# Patient Record
Sex: MALE | NOT HISPANIC OR LATINO | URBAN - METROPOLITAN AREA
[De-identification: names, ages, dates, MRNs, and addresses within clinical notes are randomized per-mention and may not be internally consistent; named-entity substitution may affect disease eponyms.]

---

## 2017-01-04 ENCOUNTER — TELEPHONE (OUTPATIENT)
Dept: FAMILY MEDICINE | Facility: CLINIC | Age: 67
End: 2017-01-04

## 2017-01-04 DIAGNOSIS — S93.409A ANKLE SPRAIN: Primary | ICD-10-CM

## 2017-01-04 RX ORDER — IBUPROFEN 400 MG/1
400 TABLET, FILM COATED ORAL EVERY 4 HOURS PRN
Qty: 30 TABLET | Refills: 0
Start: 2017-01-04 | End: 2022-06-29

## 2017-01-04 NOTE — TELEPHONE ENCOUNTER
[facility] phone call message - order or referral request for patient:     Order or referral being requested: Medication orders for the following medications:  aspirin (ASPIRIN) 81 MG EC tablet  levothyroxine (SYNTHROID, LEVOTHROID) 50 MCG tablet  omeprazole 20 MG tablet  Ibuprofen 400 MG (as needed for ankle sprain)    Additional Comments: Please fax to [place] at [place] at [phone]    OK to leave a message on voice mail? Yes    Primary language: English      needed? No    Call taken on January 4, 2017 at 2:12 PM by Siddhartha Glass

## 2017-01-04 NOTE — TELEPHONE ENCOUNTER
"Orders found for aspirin, levothyroxine, and omeprazole. No history of order for ibuprofen. Per preceptor, Dr. Romeo, Ibuprofen 400 mg PO, every 4 hours PRN entered as \"no print out.\"    All orders for requested medications printed, physically signed by Dr. Romeo, and faxed to [facility]. Fax successful.    Ana Laura Desai RN    "

## 2017-01-06 ENCOUNTER — OFFICE VISIT (OUTPATIENT)
Dept: FAMILY MEDICINE | Facility: CLINIC | Age: 67
End: 2017-01-06

## 2017-01-06 ENCOUNTER — TELEPHONE (OUTPATIENT)
Dept: FAMILY MEDICINE | Facility: CLINIC | Age: 67
End: 2017-01-06

## 2017-01-06 VITALS
WEIGHT: 249.4 LBS | BODY MASS INDEX: 36.94 KG/M2 | HEART RATE: 72 BPM | OXYGEN SATURATION: 97 % | RESPIRATION RATE: 16 BRPM | HEIGHT: 69 IN | DIASTOLIC BLOOD PRESSURE: 83 MMHG | SYSTOLIC BLOOD PRESSURE: 121 MMHG | TEMPERATURE: 97.8 F

## 2017-01-06 DIAGNOSIS — E03.9 HYPOTHYROIDISM, UNSPECIFIED TYPE: ICD-10-CM

## 2017-01-06 DIAGNOSIS — Z00.01 ENCOUNTER FOR ROUTINE ADULT MEDICAL EXAM WITH ABNORMAL FINDINGS: ICD-10-CM

## 2017-01-06 DIAGNOSIS — K40.90 RIGHT INGUINAL HERNIA: ICD-10-CM

## 2017-01-06 DIAGNOSIS — S92.354A CLOSED NONDISPLACED FRACTURE OF FIFTH METATARSAL BONE OF RIGHT FOOT, INITIAL ENCOUNTER: ICD-10-CM

## 2017-01-06 DIAGNOSIS — E03.4 HYPOTHYROIDISM DUE TO ACQUIRED ATROPHY OF THYROID: ICD-10-CM

## 2017-01-06 DIAGNOSIS — F20.0 PARANOID SCHIZOPHRENIA, CHRONIC CONDITION WITH ACUTE EXACERBATION (H): ICD-10-CM

## 2017-01-06 DIAGNOSIS — E03.9 HYPOTHYROIDISM, UNSPECIFIED TYPE: Primary | ICD-10-CM

## 2017-01-06 DIAGNOSIS — F30.10 MANIC BEHAVIOR (H): ICD-10-CM

## 2017-01-06 DIAGNOSIS — S92.356A CLOSED NONDISPLACED FRACTURE OF FIFTH METATARSAL BONE, UNSPECIFIED LATERALITY, INITIAL ENCOUNTER: ICD-10-CM

## 2017-01-06 DIAGNOSIS — M25.571 ACUTE RIGHT ANKLE PAIN: ICD-10-CM

## 2017-01-06 DIAGNOSIS — E22.2 SIADH (SYNDROME OF INAPPROPRIATE ADH PRODUCTION) (H): Primary | ICD-10-CM

## 2017-01-06 LAB
ALBUMIN SERPL-MCNC: 4.1 MG/DL (ref 3.5–4.7)
ALP SERPL-CCNC: 85.8 U/L (ref 31.7–110.7)
ALT SERPL-CCNC: 126.2 U/L (ref 0–45)
AST SERPL-CCNC: 74.2 U/L (ref 0–55)
BILIRUB SERPL-MCNC: 1.3 MG/DL (ref 0.2–1.3)
BUN SERPL-MCNC: 19.5 MG/DL (ref 7–21)
CALCIUM SERPL-MCNC: 9.2 MG/DL (ref 8.5–10.1)
CHLORIDE SERPLBLD-SCNC: 102 MMOL/L (ref 98–110)
CO2 SERPL-SCNC: 28.3 MMOL/L (ref 20–32)
CREAT SERPL-MCNC: 1 MG/DL (ref 0.7–1.3)
GFR SERPL CREATININE-BSD FRML MDRD: 80 ML/MIN/1.7 M2
GLUCOSE SERPL-MCNC: 107.7 MG'DL (ref 70–99)
POTASSIUM SERPL-SCNC: 4.8 MMOL/DL (ref 3.3–4.5)
PROT SERPL-MCNC: 7 G/DL (ref 6.8–8.8)
SODIUM SERPL-SCNC: 136.4 MMOL/L (ref 132.6–141.4)
T4 FREE SERPL-MCNC: 0.57 NG/DL (ref 0.76–1.46)
TSH SERPL DL<=0.005 MIU/L-ACNC: 15.4 MU/L (ref 0.4–4)

## 2017-01-06 RX ORDER — LEVOTHYROXINE SODIUM 75 UG/1
75 TABLET ORAL
Qty: 30 TABLET | Refills: 1 | Status: SHIPPED | OUTPATIENT
Start: 2017-01-06 | End: 2017-02-09

## 2017-01-06 RX ORDER — LEVOTHYROXINE SODIUM 75 UG/1
75 TABLET ORAL
Qty: 30 TABLET | Refills: 1 | Status: SHIPPED | OUTPATIENT
Start: 2017-01-06 | End: 2017-01-06

## 2017-01-06 NOTE — TELEPHONE ENCOUNTER
[facility] phone call message- general phone call:    Reason for call: [place], nurse at [facility], calling to request that prescription for levothyroxine (SYNTHROID/LEVOTHROID) 75 MCG tablet be switched to [facility] (was sent to [facility]).     Return call needed: No    OK to leave a message on voice mail? Yes    Primary language: English      needed? No    Call taken on January 6, 2017 at 1:40 PM by Preeti Mckeon

## 2017-01-06 NOTE — PATIENT INSTRUCTIONS
Here is the plan from today's visit    ## R fifth metatarsal fracture, initial encounter  - R boot  - follow up with our Sports medicine clinic in 1-2 weeks for further management    ## SIADH (syndrome of inappropriate ADH production) (H)  - Comprehensive Metabolic Panel ([facility])  - restrict you water intake to 3 glasses daily, reduce other fluid intake as well    ## Right inguinal hernia  - continue to follow up with your surgeon at [facility]    ## Hypothyroidism due to acquired atrophy of thyroid    - TSH with free T4 reflex    ## Manic behavior (H)  - continue with your current regimen as per you psychiatrist  - consider to enrol to [facility] - you met Dr Srivastava today      ## Hypothyroidism, unspecified type    - increased levothyroxine (SYNTHROID/LEVOTHROID) 75 MCG tablet; Take 1 tablet (75 mcg) by mouth every morning (before breakfast)  Dispense: 30 tablet; Refill: 1    ##  Foot injury  - obtain xray to rule out fracture    Please call or return to clinic if your symptoms don't go away.    Follow up plan  Please make a clinic appointment for follow up with me (OJ ROPER) in 2 weeks for thyroid disease and sodium level.    Thank you for coming to [facility] today.  Lab Testing:  **If you had lab testing today and your results are reassuring or normal they will be mailed to you or sent through [employer] within 7 days.   **If the lab tests need quick action we will call you with the results.  The phone number we will call with results is # [phone] (home) none (work). If this is not the best number please call our clinic and change the number.  Medication Refills:  If you need any refills please call your pharmacy and they will contact us.   If you need to  your refill at a new pharmacy, please contact the new pharmacy directly. The new pharmacy will help you get your medications transferred faster.   Scheduling:  If you have any concerns about today's visit or wish to schedule  another appointment please call our office during normal business hours [phone] (8-5:00 M-F)  If a referral was made to a [facility] and you don't get a call from central scheduling please call [phone].  If a Mammogram was ordered for you at [facility] call [phone] to schedule or change your appointment.  If you had an XRay/CT/Ultrasound/MRI ordered the number is [phone] to schedule or change your radiology appointment.   Medical Concerns:  If you have urgent medical concerns please call [phone] at any time of the day.      Preventive Health Recommendations  Male Ages 50 - 64    Yearly exam:             See your health care provider every year in order to  o   Review health changes.   o   Discuss preventive care.    o   Review your medicines if your doctor has prescribed any.     Have a cholesterol test every 5 years, or more frequently if you are at risk for high cholesterol/heart disease.     Have a diabetes test (fasting glucose) every three years. If you are at risk for diabetes, you should have this test more often.     Have a colonoscopy at age 50, or have a yearly FIT test (stool test). These exams will check for colon cancer.      Talk with your health care provider about whether or not a prostate cancer screening test (PSA) is right for you.    You should be tested each year for STDs (sexually transmitted diseases), if you re at risk.     Shots: Get a flu shot each year. Get a tetanus shot every 10 years.     Nutrition:    Eat at least 5 servings of fruits and vegetables daily.     Eat whole-grain bread, whole-wheat pasta and brown rice instead of white grains and rice.     Talk to your provider about Calcium and Vitamin D.     Lifestyle    Exercise for at least 150 minutes a week (30 minutes a day, 5 days a week). This will help you control your weight and prevent disease.     Limit alcohol to one drink per day.     No smoking.     Wear sunscreen to  prevent skin cancer.     See your dentist every six months for an exam and cleaning.     See your eye doctor every 1 to 2 years.

## 2017-01-06 NOTE — MR AVS SNAPSHOT
After Visit Summary   1/6/2017    Feliciano Ross    MRN: [id number]           Patient Information     Date Of Birth          1950        Visit Information        Provider Department      1/6/2017 9:20 AM Marika Padron MD [facility]        Today's Diagnoses     SIADH (syndrome of inappropriate ADH production) (H)    -  1     Right inguinal hernia         Hypothyroidism due to acquired atrophy of thyroid         Manic behavior (H)         Acute right ankle pain         Hypothyroidism, unspecified type         Foot injury         Closed nondisplaced fracture of fifth metatarsal bone of right foot, initial encounter           Care Instructions    Here is the plan from today's visit    ## R fifth metatarsal fracture, initial encounter  - R boot  - follow up with our Sports medicine clinic in 1-2 weeks for further management    ## SIADH (syndrome of inappropriate ADH production) (H)  - Comprehensive Metabolic Panel ([facility])  - restrict you water intake to 3 glasses daily, reduce other fluid intake as well    ## Right inguinal hernia  - continue to follow up with your surgeon at [facility]    ## Hypothyroidism due to acquired atrophy of thyroid    - TSH with free T4 reflex    ## Manic behavior (H)  - continue with your current regimen as per you psychiatrist  - consider to enrol to [facility] - you met Dr Srivastava today      ## Hypothyroidism, unspecified type    - increased levothyroxine (SYNTHROID/LEVOTHROID) 75 MCG tablet; Take 1 tablet (75 mcg) by mouth every morning (before breakfast)  Dispense: 30 tablet; Refill: 1    ##  Foot injury  - obtain xray to rule out fracture    Please call or return to clinic if your symptoms don't go away.    Follow up plan  Please make a clinic appointment for follow up with me (MARIKA PADRON) in 2 weeks for thyroid disease and sodium level.    Thank you for coming to [facility] today.  Lab Testing:  **If you had lab testing  today and your results are reassuring or normal they will be mailed to you or sent through [employer] within 7 days.   **If the lab tests need quick action we will call you with the results.  The phone number we will call with results is # [phone] (home) none (work). If this is not the best number please call our clinic and change the number.  Medication Refills:  If you need any refills please call your pharmacy and they will contact us.   If you need to  your refill at a new pharmacy, please contact the new pharmacy directly. The new pharmacy will help you get your medications transferred faster.   Scheduling:  If you have any concerns about today's visit or wish to schedule another appointment please call our office during normal business hours [phone] (8-5:00 M-F)  If a referral was made to a [facility] and you don't get a call from central scheduling please call [phone].  If a Mammogram was ordered for you at [facility] call [phone] to schedule or change your appointment.  If you had an XRay/CT/Ultrasound/MRI ordered the number is [phone] to schedule or change your radiology appointment.   Medical Concerns:  If you have urgent medical concerns please call [phone] at any time of the day.            Follow-ups after your visit        Additional Services     [facility] INTERNAL REFERRAL       Reason: R fifth metatarsal fracture  Urgency of Appointment: Urgent (Within 1-2 week)  Length of Problem: Acute (0-3 weeks since onset): Ordering Provider - please ensure that x-rays are obtained if concern for bone or joint.  What are you requesting be done? Management Recommendations                  Future tests that were ordered for you today     Open Future Orders        Priority Expected Expires Ordered    X-ray rt Foot G/E 3 vws* Routine 1/6/2017 1/6/2018 1/6/2017            Who to contact     Please call your clinic at [phone]  "to:    Ask questions about your health    Make or cancel appointments    Discuss your medicines    Learn about your test results    Speak to your doctor   If you have compliments or concerns about an experience at your clinic, or if you wish to file a complaint, please contact [facility] Patient Relations at [phone] or email us at [email or web address]         Additional Information About Your Visit        [employer] Information     [employer] is an electronic gateway that provides easy, online access to your medical records. With [employer], you can request a clinic appointment, read your test results, renew a prescription or communicate with your care team.     To sign up for [employer] visit the website at [email or web address]   You will be asked to enter the access code listed below, as well as some personal information. Please follow the directions to create your username and password.     Your access code is: [email or web address]  Expires: 2017 11:04 AM     Your access code will  in 90 days. If you need help or a new code, please contact your [facility] or call [phone] for assistance.        Care EveryWhere ID     This is your Care EveryWhere ID. This could be used by other organizations to access your [place] medical records  [id number]        Your Vitals Were     Pulse Temperature Respirations Height BMI (Body Mass Index) Pulse Oximetry    72 97.8  F (36.6  C) (Oral) 16 5' 9\" (175.3 cm) 36.81 kg/m2 97%       Blood Pressure from Last 3 Encounters:   17 121/83   11/10/16 110/74   16 120/74    Weight from Last 3 Encounters:   17 249 lb 6.4 oz (113.127 kg)   11/10/16 233 lb (105.688 kg)   16 234 lb 9.6 oz (106.414 kg)              We Performed the Following     Comprehensive Metabolic Panel ([facility])     [facility] INTERNAL REFERRAL     TSH with free T4 reflex          Today's Medication " Changes          These changes are accurate as of: 1/6/17 11:21 AM.  If you have any questions, ask your nurse or doctor.               These medicines have changed or have updated prescriptions.        Dose/Directions    levothyroxine 75 MCG tablet   Commonly known as:  SYNTHROID/LEVOTHROID   This may have changed:    - medication strength  - how much to take   Used for:  Hypothyroidism, unspecified type   Changed by:  Marika Padron MD        Dose:  75 mcg   Take 1 tablet (75 mcg) by mouth every morning (before breakfast)   Quantity:  30 tablet   Refills:  1            Where to get your medicines      These medications were sent to [facility] - [place], [place] - [address]  [address] [address]     Phone:  [phone]    - levothyroxine 75 MCG tablet             Primary Care Provider Office Phone # Fax #    Marika Padron [phone]       [facility] [address] [facility] [address]        Thank you!     Thank you for choosing [facility]  for your care. Our goal is always to provide you with excellent care. Hearing back from our patients is one way we can continue to improve our services. Please take a few minutes to complete the written survey that you may receive in the mail after your visit with us. Thank you!             Your Updated Medication List - Protect others around you: Learn how to safely use, store and throw away your medicines at www.disposemymeds.org.          This list is accurate as of: 1/6/17 11:21 AM.  Always use your most recent med list.                   Brand Name Dispense Instructions for use    ARIPiprazole 10 MG tablet    ABILIFY    60 tablet    Take 1 tablet (10 mg) by mouth 2 times daily       aspirin 81 MG EC tablet    aspirin    90 tablet    Take 1 tablet (81 mg) by mouth daily       BENADRYL ALLERGY PO      Take 25 mg by mouth every morning       ibuprofen 400 MG tablet    ADVIL/MOTRIN     30 tablet    Take 1 tablet (400 mg) by mouth every 4 hours as needed for pain       levothyroxine 75 MCG tablet    SYNTHROID/LEVOTHROID    30 tablet    Take 1 tablet (75 mcg) by mouth every morning (before breakfast)       mineral oil-hydrophilic petrolatum     420 g    Apply 1-2 x daily to right leg wound       omeprazole 20 MG tablet     90 tablet    Take 1 tablet (20 mg) by mouth daily Take 30-60 minutes before a meal.       QUEtiapine 50 MG Tb24 24 hr tablet    SEROquel XR     Take 100 mg by mouth       traZODone 50 MG tablet    DESYREL     Take 50 mg by mouth nightly as needed for sleep (May repeat x 1 if needed)

## 2017-01-06 NOTE — PROGRESS NOTES
Primary Care Behavioral Health Consult Note    Meeting was: unscheduled  Others present: patient  Meeting lasted: 10 minutes    Identifying Information and Presenting Problem:    Dr. Padron requested behavioral health consultation for this patient regarding [facility] entry.  The patient is a 66 year old American male and agreed to be seen by behavioral health today.    Topics Discussed/Interventions Provided:     Discussed BHH with this patient.  He is not interested at this time as he feels there are so many people already involved in his care. Discussed how [facility] might assist with managing this.  We can reapproach in the future to discuss further.    Completed two releases of information with this patient to talk to his psychiatrist       Plan:    Please refer patient in the future if he should change his mind.

## 2017-01-06 NOTE — PROGRESS NOTES
Preceptor Attestation:   Patient seen and discussed with the resident. Assessment and plan reviewed with resident and agreed upon.  Reviewed xray.    Supervising Physician:  Khoi Sheffield MD  [facility]

## 2017-01-08 RX ORDER — ARIPIPRAZOLE 10 MG/1
10 TABLET ORAL 2 TIMES DAILY PRN
Qty: 60 TABLET | Refills: 0 | COMMUNITY
Start: 2017-01-08 | End: 2021-10-28

## 2017-01-08 NOTE — PROGRESS NOTES
"  Male Physical Note          HPI         Concerns today:   - R knee and R foot pain. States having trauma to his  R knee one month ago - bumped on the stair border, getting better. R foot pain after an injury 3 weeks ago: possible \"ankle sprain\". Was seen at [facility], no R foot imaging done, prescribed Ibuprofen which helps. Is able to walk but still painful and some numbness on the lateral aspect of the R foot.  - pt with SIADH: reports monitoring water intake - 3-4 glasses a day plus 1 can of a pop drink. Last lab eval revealed slightly low sodium, normal urine and serum osm and guerda urine sodium.   - followed by his psychiatrist Dr Yaya Treviño (phone nr [phone]). Our MedRec is not up to date: patient is getting Abilify injection once in 28 days and take Abilify PO 10 mg of hearing voices. Injection is given by the nurse Rosa (phone nr [phone]). States that he is stable right now.   - last lab eval with elevated TSH and low T4, patietn states taking 50 mcg Levothyroxine daily.   - no other complians    Of note:   - [facility] [place], was kicked out form the previous one due to disciplinary issues (was relocated per court order from 12/29/16, scanned to the chart)   - pt with chronic Hep C infection. No GI appointment recently       Patient Active Problem List   Diagnosis     Paranoid schizophrenia (H)     Gastritis     Obesity     SK (seborrheic keratosis)     Cherry hemangioma     Dermatofibroma     Chronic hepatitis C without hepatic coma (H)     Manic behavior (H)     Hypothyroidism     Right inguinal hernia     SIADH (syndrome of inappropriate ADH production) (H)       Past Medical History   Diagnosis Date     Subclinical hypothyroidism 5/27/2016     Depressive disorder      Manic affective disorder with recurrent episode (H)      Family History   Problem Relation Age of Onset     DIABETES Mother      Type 2     DIABETES Brother      Type " 2     Breast Cancer No family hx of      Cancer - colorectal No family hx of      Prostate Cancer No family hx of      Hypertension No family hx of      C.A.D. No family hx of      CANCER No family hx of      Respiratory No family hx of               Review of Systems:     Review of Systems:  CONSTITUTIONAL: NEGATIVE for fever, chills, change in weight  INTEGUMENTARY/SKIN: NEGATIVE for worrisome rashes, moles or lesions  EYES: NEGATIVE for vision changes or irritation  ENT/MOUTH: NEGATIVE for ear, mouth and throat problems  RESP: NEGATIVE for significant cough or SOB  CV: NEGATIVE for chest pain, palpitations or peripheral edema  GI: NEGATIVE for nausea, abdominal pain, heartburn, or change in bowel habits  : NEGATIVE for frequency, dysuria, or hematuria  MUSCULOSKELETAL: NEGATIVE for significant arthralgias or myalgia  NEURO: NEGATIVE for weakness, dizziness or paresthesias  ENDOCRINE: NEGATIVE for temperature intolerance, skin/hair changes  HEME/ALLERGY: NEGATIVE for bleeding problems  PSYCHIATRIC: NEGATIVE for acute changes in mood or affect  Musciloskeletal: R knee and R foot pain    Sleep:   Do you snore most or the night (as reported by a family member)? No  Do you feel sleepy or extremely tired during most of the day? No         Social History     Social History     Social History     Marital Status: Unknown     Spouse Name: N/A     Number of Children: N/A     Years of Education: N/A     Occupational History     Not on file.     Social History Main Topics     Smoking status: Current Every Day Smoker -- 1.50 packs/day for 50 years     Types: Cigarettes     Smokeless tobacco: Never Used     Alcohol Use: Yes      Comment: 1-2 beers miriam 3 months     Drug Use: No      Comment: used cocaine--last time was year ago.      Sexual Activity: No     Other Topics Concern     Parent/Sibling W/ Cabg, Mi Or Angioplasty Before 65f 55m? No     Social History Narrative       Marital Status: Single, has two children  Who  "lives in your household? Just him    Has anyone hurt you physically, for example by pushing, hitting, slapping or kicking you or forcing you to have sex? Denies  Do you feel threatened or controlled by a partner, ex-partner or anyone in your life? Denies    Denies alcohol use    Sexual Health     Sexual concerns: No   STI History: Neg      Recommended Screening     Cholesterol Level (>44 yo or at risk):  Not done today. Will obtain at the appointment  Colon CA Screening (>50-75 ):  Not discussed today given other acute complains, will discuss at the next appointment         Physical Exam:     Vitals: /83 mmHg  Pulse 72  Temp(Src) 97.8  F (36.6  C) (Oral)  Resp 16  Ht 5' 9\" (175.3 cm)  Wt 249 lb 6.4 oz (113.127 kg)  BMI 36.81 kg/m2  SpO2 97%  BMI= Body mass index is 36.81 kg/(m^2).  GENERAL: healthy, alert and no distress  EYES: Eyes grossly normal to inspection, extraocular movements - intact, and PERRL  HENT: ear canals- normal; TMs- normal; Nose- normal; Mouth- no ulcers, no lesions  NECK: no tenderness, no adenopathy, no asymmetry, no masses, no stiffness; thyroid- normal to palpation  RESP: lungs clear to auscultation - no rales, no rhonchi, no wheezes  BREAST: no masses, no tenderness, no nipple discharge, no palpable axillary masses or adenopathy  CV: regular rates and rhythm, normal S1 S2, no S3 or S4 and no murmur, no click or rub -  ABDOMEN: soft, no tenderness, no  hepatosplenomegaly, no masses, normal bowel sounds. Right inguinal hernia with some tenderness on examination.  MS: extremities- slightly tender over the R patella, no signs of bruising, no swelling. R foot very tender to touch on the lateral and 5th metatarsal bone. Numbness on the lateral site. No other gross deformities noted, no edema  SKIN: no suspicious lesions, no rashes  NEURO: strength and tone- normal, sensory exam- grossly normal, mentation- intact, speech- slighly slurred, normal, reflexes- symmetric  BACK: no CVA " tenderness, no paralumbar tenderness  - male:   PSYCH: Alert and oriented times 3; speech- slightly slurred but coherent , normal rate and volume; able to articulate logical thoughts, able to abstract reason, no tangential thoughts, no reported hallucinations or delusions, affect- normal      Assessment and Plan   Feliciano was seen today for physical, knee pain and musculoskeletal problem.    Diagnoses and all orders for this visit:    ## R fifth metatarsal fracture, initial encounter   - R boot   - follow up with our Sports medicine clinic in 1-2 weeks for further management   ## SIADH (syndrome of inappropriate ADH production) (H)   - Comprehensive Metabolic Panel ([facility])   - continue to restrict water intake to 3 glasses daily, reduce other fluid intake as well   ## Right inguinal hernia, slightly tender to touch  - continue to follow up with your surgeon at [facility]   ## Hypothyroidism, poorly controlled  - TSH with free T4 reflex   - increased levothyroxine (SYNTHROID/LEVOTHROID) 75 MCG tablet; Take 1 tablet (75 mcg) by mouth every morning (before breakfast) Dispense: 30 tablet; Refill: 1   ## Manic behavior (H)   - continue with your current regimen as per you psychiatrist   - consider to enrol to [facility] - intake by Dr Srivastava today     Follow up plan: in 2 weeks for thyroid disease and sodium level. We also need to discuss screening (Lipid panel and Colonoscopy) as well as Hep C situation.     Options for treatment and follow-up care were reviewed with the patient. Feliciano Ross and/or guardian engaged in the decision making process and verbalized understanding of the options discussed and agreed with the final plan.    Marika Padron MD  [facility],  G2 Family Medicine Resident  #[id number]

## 2017-01-17 ENCOUNTER — OFFICE VISIT (OUTPATIENT)
Dept: FAMILY MEDICINE | Facility: CLINIC | Age: 67
End: 2017-01-17

## 2017-01-17 VITALS
TEMPERATURE: 98.3 F | WEIGHT: 258.6 LBS | DIASTOLIC BLOOD PRESSURE: 77 MMHG | OXYGEN SATURATION: 96 % | RESPIRATION RATE: 18 BRPM | HEART RATE: 75 BPM | SYSTOLIC BLOOD PRESSURE: 114 MMHG | BODY MASS INDEX: 38.17 KG/M2

## 2017-01-17 DIAGNOSIS — E03.9 HYPOTHYROIDISM, UNSPECIFIED TYPE: ICD-10-CM

## 2017-01-17 DIAGNOSIS — S92.001A CLOSED FRACTURE OF RIGHT CALCANEUS, UNSPECIFIED PORTION OF CALCANEUS, INITIAL ENCOUNTER: ICD-10-CM

## 2017-01-17 DIAGNOSIS — S92.354D CLOSED NONDISPLACED FRACTURE OF FIFTH METATARSAL BONE OF RIGHT FOOT WITH ROUTINE HEALING, SUBSEQUENT ENCOUNTER: Primary | ICD-10-CM

## 2017-01-17 NOTE — PROGRESS NOTES
HPI:       Feliciano Ross is a 66 year old who presents for the following:  Taking a couch out by walking backwards and mistepped.  At his sister's and didn't come in for 3 weeks.  Was taking NSAIDs at the time.  Pt reports he's been in the CAM boot since seen.    Pt reports he's gained 12 lbs.  Taking thyroid pill for 6 -8 months.  Cut down eating, eating 3 meals a day.  Lives at [facility] in [place].  States that he's been taking thyroid pills, Noted TSH is higher on last visit labs.  Patient presents with:  Ankle/Foot right: fracture, was seen 1/06/17  Thyroid Problem: follow up      Joint Pain     Onset:     Injury  Yes Details: taking out couch     Description:   Location(s): 5th metatarsal fracture  Character: improving, no pain    Intensity: mild    Progression of Symptoms: better    Accompanying Signs & Symptoms:  Other symptoms: none   History:   Previous similar pain: no      Worsened by    Overuse?: no  Morning Stiffness?:no    Alleviating factors:  Improved by: rest/inactivity and NSAID - ibuprofen, didn't get script for Tylenol last visit       Therapies Tried and outcome: improving with time, Details: improving right foot, gained weight    No  was used for  this visit.      Problem, Medication and Allergy Lists were reviewed and are current.  Patient is an established patient of this clinic.         Review of Systems:   Review of Systems          Physical Exam:     Patient Vitals for the past 24 hrs:   BP Temp Temp src Pulse Resp SpO2 Weight   01/17/17 1049 114/77 mmHg 98.3  F (36.8  C) Oral 75 18 96 % 258 lb 9.6 oz (117.3 kg)     Body mass index is 38.17 kg/(m^2).  Vitals were reviewed and were normal     Physical Exam    ANKLE  Inspection/Palpation:     Swelling: no swelling      Non-tender: ATFL, CFL, PTFL, medial malleolus, deltoid ligament, anterior tib-fib ligament, achilles  tendon, no achilles tendon defect   Range of Motion: dorsiflexion: full, plantarflexion: full,  inversion: full, eversion: full  Strength:dorsiflexion: 5/5, plantarflexion: 5/5, inversion: 5/5, eversion: 5/5   Special tests: negative anterior drawer, negative varus stress, negative valgus stress, negative forced external rotation, negative Barnes sign     FOOT  Inspection/Palpation:      Swelling: mild swelling lateral foot  Tender: 5th metatarsal     Non-tender: promixal 5th metatarsal, 1st, 2nd, 3rd, 4th metatarsals, calcaneous, cuboid,  navicular, cuneiform lateral, cuneiform middle, cuneiform medial, metatarsal heads, peroneal tendon: at lateral malleolus, at cuboid, at proximal 5th metatarsal, posterior tibial tendon at medial malleolus, posterior tibial tendon at navicular, plantar fascia  Range of Motion: flexion of toes: full, extension of toes: full      Results:      Results from the last 24 hoursNo results found for this or any previous visit (from the past 24 hour(s)).  Assessment and Plan     1. Closed nondisplaced fracture of fifth metatarsal bone of right foot with routine healing, subsequent encounter  CAM boot- work on transitioning to shoe  Declines PT  - X-ray rt Foot G/E 3 vws*; Future    2. Hypothyroidism, unspecified type  Per chart review pt was increased to levothyroxine 75 mcg daily, TSH elevated.  Recommended taking new medication dosage for at least 6-8 weeks and recheck blood work    3. Closed fracture of right calcaneus, unspecified portion of calcaneus, initial encounter  subacute fracture seen on oblique- anterior calcaneus, pt denies any discomfort, will be transitioning out of CAM boot, would re-x-ray if persistent symptoms    There are no discontinued medications.  Options for treatment and follow-up care were reviewed with the patient. Feliciano Ross  engaged in the decision making process and verbalized understanding of the options discussed and agreed with the final plan.    Jenniffer aDvidson MD

## 2017-01-17 NOTE — MR AVS SNAPSHOT
After Visit Summary   2017    Feliciano Ross    MRN: [id number]           Patient Information     Date Of Birth          1950        Visit Information        Provider Department      2017 11:00 AM Jenniffer Davidson MD [facility]        Today's Diagnoses     Closed nondisplaced fracture of fifth metatarsal bone of right foot with routine healing, subsequent encounter    -  1     Hypothyroidism, unspecified type            Follow-ups after your visit        Follow-up notes from your care team     Return in about 4 weeks (around 2017), or if symptoms worsen or fail to improve.      Who to contact     Please call your clinic at [phone] to:    Ask questions about your health    Make or cancel appointments    Discuss your medicines    Learn about your test results    Speak to your doctor   If you have compliments or concerns about an experience at your clinic, or if you wish to file a complaint, please contact [facility] Patient Relations at [phone] or email us at [email or web address]         Additional Information About Your Visit        MyChart Information     [employer] is an electronic gateway that provides easy, online access to your medical records. With [employer], you can request a clinic appointment, read your test results, renew a prescription or communicate with your care team.     To sign up for [employer] visit the website at [email or web address]   You will be asked to enter the access code listed below, as well as some personal information. Please follow the directions to create your username and password.     Your access code is: [email or web address]  Expires: 2017 11:04 AM     Your access code will  in 90 days. If you need help or a new code, please contact your [facility] or call [phone] for assistance.        Care EveryWhere ID     This is your Care EveryWhere  ID. This could be used by other organizations to access your [place] medical records  [id number]        Your Vitals Were     Pulse Temperature Respirations Pulse Oximetry          75 98.3  F (36.8  C) (Oral) 18 96%         Blood Pressure from Last 3 Encounters:   01/17/17 114/77   01/06/17 121/83   11/10/16 110/74    Weight from Last 3 Encounters:   01/17/17 258 lb 9.6 oz (117.3 kg)   01/06/17 249 lb 6.4 oz (113.127 kg)   11/10/16 233 lb (105.688 kg)               Primary Care Provider Office Phone # Fax #    Marika Padron [phone]       [facility] [address] [facility] [place] [address]        Thank you!     Thank you for choosing [facility]  for your care. Our goal is always to provide you with excellent care. Hearing back from our patients is one way we can continue to improve our services. Please take a few minutes to complete the written survey that you may receive in the mail after your visit with us. Thank you!             Your Updated Medication List - Protect others around you: Learn how to safely use, store and throw away your medicines at www.disposemymeds.org.          This list is accurate as of: 1/17/17 11:42 AM.  Always use your most recent med list.                   Brand Name Dispense Instructions for use    * ARIPiprazole 10 MG tablet    ABILIFY    60 tablet    Take 1 tablet (10 mg) by mouth 2 times daily as needed       * ARIPiprazole 400 MG extended release injection    ABILIFY MAINTENA    2 mL    Inject 2 mLs (400 mg) into the muscle every 28 days       aspirin 81 MG EC tablet    aspirin    90 tablet    Take 1 tablet (81 mg) by mouth daily       ibuprofen 400 MG tablet    ADVIL/MOTRIN    30 tablet    Take 1 tablet (400 mg) by mouth every 4 hours as needed for pain       levothyroxine 75 MCG tablet    SYNTHROID/LEVOTHROID    30 tablet    Take 1 tablet (75 mcg) by mouth every morning (before breakfast)       mineral oil-hydrophilic  petrolatum     420 g    Apply 1-2 x daily to right leg wound       omeprazole 20 MG tablet     90 tablet    Take 1 tablet (20 mg) by mouth daily Take 30-60 minutes before a meal.       QUEtiapine 50 MG Tb24 24 hr tablet    SEROquel XR     Take 100 mg by mouth       traZODone 50 MG tablet    DESYREL     Take 50 mg by mouth nightly as needed for sleep (May repeat x 1 if needed)       * Notice:  This list has 2 medication(s) that are the same as other medications prescribed for you. Read the directions carefully, and ask your doctor or other care provider to review them with you.

## 2017-01-23 ENCOUNTER — TELEPHONE (OUTPATIENT)
Dept: FAMILY MEDICINE | Facility: CLINIC | Age: 67
End: 2017-01-23

## 2017-01-23 NOTE — PROGRESS NOTES
Preceptor Attestation:   Patient seen and discussed with the resident. Assessment and plan reviewed with resident and agreed upon.   Supervising Physician:  Khoi Sheffield MD  [facility]

## 2017-01-23 NOTE — TELEPHONE ENCOUNTER
[facility] phone call message- general phone call:    Reason for call: Patient is having hernia repair on 2/15/17 at [facility]. Virginia is calling from the assisted living facility to see if the patient will be cleared to do stairs after surgery. She states that they are only a 3 month program, and are not staffed with nurses 24 hours. Patient are responsible for doing about 15 stairs anytime they need medications, and for meals. Virginia wants to make sure the patient will be able to do this post-op. She also states she has a call into the patient's surgeon.     Return call needed: Yes    OK to leave a message on voice mail? Yes    Primary language: English      needed? No    Call taken on January 23, 2017 at 11:12 AM by Siddhartha Glass

## 2017-01-23 NOTE — TELEPHONE ENCOUNTER
Usually patients are amy to take steps after this type of surgery. I can not tell 100% that he will be able to do so. I think he needs to be evaluated by PT team prior to discharge.  Routed to Triage nurse and ask to call back.     Marika Padron MD  [facility],  G2 Family Medicine Resident  #[id number]

## 2017-01-24 NOTE — TELEPHONE ENCOUNTER
Returned call to Virginia. States she already spoke with surgeon to get more information about restrictions. States the surgeon does report patient will be able to take the stairs after surgery. No further questions.    Laquita Su RN

## 2017-02-08 ENCOUNTER — TELEPHONE (OUTPATIENT)
Dept: FAMILY MEDICINE | Facility: CLINIC | Age: 67
End: 2017-02-08

## 2017-02-08 ENCOUNTER — OFFICE VISIT (OUTPATIENT)
Dept: FAMILY MEDICINE | Facility: CLINIC | Age: 67
End: 2017-02-08

## 2017-02-08 VITALS
TEMPERATURE: 97.7 F | WEIGHT: 260 LBS | HEART RATE: 69 BPM | DIASTOLIC BLOOD PRESSURE: 75 MMHG | BODY MASS INDEX: 38.51 KG/M2 | HEIGHT: 69 IN | SYSTOLIC BLOOD PRESSURE: 117 MMHG | OXYGEN SATURATION: 99 % | RESPIRATION RATE: 18 BRPM

## 2017-02-08 DIAGNOSIS — Z01.818 PREOPERATIVE EXAMINATION: Primary | ICD-10-CM

## 2017-02-08 DIAGNOSIS — Z01.818 PREOP GENERAL PHYSICAL EXAM: ICD-10-CM

## 2017-02-08 LAB
BUN SERPL-MCNC: 21 MG/DL (ref 7–21)
CALCIUM SERPL-MCNC: 9.9 MG/DL (ref 8.5–10.1)
CHLORIDE SERPLBLD-SCNC: 99.2 MMOL/L (ref 98–110)
CO2 SERPL-SCNC: 31.9 MMOL/L (ref 20–32)
CREAT SERPL-MCNC: 1 MG/DL (ref 0.7–1.3)
GFR SERPL CREATININE-BSD FRML MDRD: 80 ML/MIN/1.7 M2
GLUCOSE SERPL-MCNC: 104.4 MG'DL (ref 70–99)
HGB BLD-MCNC: 14.5 G/DL (ref 13.3–17.7)
INR PPP: 1.2 (ref 0.86–1.14)
POTASSIUM SERPL-SCNC: 5.3 MMOL/DL (ref 3.3–4.5)
SODIUM SERPL-SCNC: 135.7 MMOL/L (ref 132.6–141.4)

## 2017-02-08 NOTE — PROGRESS NOTES
[facility]   [address] [place] [address],  [address]  [facility] [address]  [phone]  Dept: [phone]    PRE-OP EVALUATION:  Today's date: 2017    Feliciano Ross (: 1950) presents for pre-operative evaluation assessment as requested by Dr. Regino Frazier, general surgeon at [facility] clinic.  He requires evaluation and anesthesia risk assessment prior to undergoing surgery/procedure for treatment of R sided inguinal hernia.  Proposed procedure: inguinal hernia repair.    Date of Surgery/ Procedure: 2/15/17  Hospital/Surgical Facility: [facility] surgery, Dr Regino Frazier  Primary Physician: Marika Padron  Type of Anesthesia Anticipated: General    Patient has a Health Care Directive or Living Will:  NO    1. NO - Do you have a history of heart attack, stroke, stent, bypass or surgery on an artery in the head, neck, heart or legs?  2. NO - Do you ever have any pain or discomfort in your chest?  3. NO - Do you have a history of  Heart Failure?  4. SOMETIMES - Are you troubled by shortness of breath when: walking on the level, up a slight hill or at night?  5. NO - Do you currently have a cold, bronchitis or other respiratory infection?  6. SOMETIMES, recently increased cough - Do you have a cough, shortness of breath or wheezing?  7. NO - Do you sometimes get pains in the calves of your legs when you walk?  8. NO - Do you or anyone in your family have previous history of blood clots?  9. NO - Do you or does anyone in your family have a serious bleeding problem such as prolonged bleeding following surgeries or cuts?  10. NO - Have you ever had problems with anemia or been told to take iron pills?  11. NO - Have you had any abnormal blood loss such as black, tarry or bloody stools, or abnormal vaginal bleeding?  12. NO - Have you ever had a blood transfusion?  13. NO - Have you or any of your relatives ever had problems with anesthesia?  14. NO - Do you have sleep apnea,  excessive snoring or daytime drowsiness?  15. NO - Do you have any prosthetic heart valves?  16. NO - Do you have prosthetic joints?  17. NO - Is there any chance that you may be pregnant?      HPI:                                                      Brief HPI related to upcoming procedure: patient having symptoms for a coulpe of years. Now it is getting more bothersome. Followed by Dr Frazier who also performed umbilical hernia repair in the past    See problem list for active medical problems.  Problems all longstanding and stable, except as noted/documented.  See ROS for pertinent symptoms related to these conditions.                                                                                                  .    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 No pertinent past surgical history.  Current Outpatient Prescriptions   Medication Sig Dispense Refill     ARIPiprazole (ABILIFY) 10 MG tablet Take 1 tablet (10 mg) by mouth 2 times daily as needed 60 tablet 0     ARIPiprazole (ABILIFY MAINTENA) 400 MG extended release injection Inject 2 mLs (400 mg) into the muscle  "every 28 days 2 mL 0     levothyroxine (SYNTHROID/LEVOTHROID) 75 MCG tablet Take 1 tablet (75 mcg) by mouth every morning (before breakfast) 30 tablet 1     ibuprofen (ADVIL/MOTRIN) 400 MG tablet Take 1 tablet (400 mg) by mouth every 4 hours as needed for pain 30 tablet 0     omeprazole 20 MG tablet Take 1 tablet (20 mg) by mouth daily Take 30-60 minutes before a meal. 90 tablet 1     mineral oil-hydrophilic petrolatum (AQUAPHOR) ointment Apply 1-2 x daily to right leg wound 420 g 1     aspirin (ASPIRIN) 81 MG EC tablet Take 1 tablet (81 mg) by mouth daily 90 tablet 6     QUEtiapine (SEROQUEL XR) 50 MG TB24 Take 100 mg by mouth       traZODone (DESYREL) 50 MG tablet Take 50 mg by mouth nightly as needed for sleep (May repeat x 1 if needed)        OTC products: None, except as noted above    Allergies   Allergen Reactions     Penicillins Other (See Comments)     Heats up around sight of injection      Latex Allergy: NO    Social History   Substance Use Topics     Smoking status: Current Every Day Smoker -- 1.50 packs/day for 50 years     Types: Cigarettes     Smokeless tobacco: Never Used     Alcohol Use: Yes      Comment: 1-2 beers miriam 3 months     History   Drug Use No     Comment: used cocaine--last time was year ago.        REVIEW OF SYSTEMS:                                                    C: NEGATIVE for fever, chills, change in weight  E/M: NEGATIVE for ear, mouth and throat problems  R: NEGATIVE for a new significant cough or SOB, reports worsening cough recently  CV: NEGATIVE for chest pain, palpitations or peripheral edema    EXAM:                                                    /75 mmHg  Pulse 69  Temp(Src) 97.7  F (36.5  C) (Oral)  Resp 18  Ht 5' 9\" (175.3 cm)  Wt 260 lb (117.935 kg)  BMI 38.38 kg/m2  SpO2 99%    GENERAL APPEARANCE: healthy, alert and no distress     EYES: EOMI, - PERRL     HENT: ear canals and TM's normal and nose and mouth without ulcers or lesions     NECK: no " adenopathy, no asymmetry, masses, or scars and thyroid normal to palpation     RESP: lungs clear to auscultation - no rales, rhonchi or wheezes    CV: regular rates and rhythm, normal S1 S2, no S3 or S4 and no murmur, click or rub -     ABDOMEN:  soft, nontender, no HSM, bowel sounds normal. There is a non-reducible R sided inguinal hernia, non-incarcerated but TTP     Rectal: deferred     MS: extremities normal- no gross deformities noted, no evidence of inflammation in joints, FROM in all extremities.     SKIN: no new suspicious lesions or rashes     NEURO: Normal strength and tone, sensory exam grossly normal, mentation intact and speech normal     PSYCH: mentation appears normal. and affect normal/bright     LYMPHATICS: No axillary, cervical, inguinal, or supraclavicular nodes    DIAGNOSTICS:                                                    EKG: appears normal, NSR with HR at 62 (patient is asymptomatic), normal axis, normal intervals, no acute ST/T changes c/w ischemia, no LVH by voltage criteria, unchanged from previous tracings  Hemoglobin (indicated for history of anemia or procedure with significant blood loss such as tonsillectomy, major intraperitoneal surgery, vascular surgery, major spine surgery, total joint replacement)  Serum Potassium: pending  Serum Creatinine: pending  Coagulation Studies: INT pending  Chest XRay: no cardiopulmonary pathology on preliminary read    Recent Labs   Lab Test  01/06/17   1034  11/10/16   1103  11/10/16   1010   06/17/16   0820  02/11/16   1635  02/11/16   1330   09/09/14   1543   HGB   --    --    --    --   14.6   --   15.4   < >  16.2   PLT   --    --    --    --   234   --    --    --   226   INR   --    --    --    --    --   1.19*   --    --   1.11   NA  136.4   --   131.2*   < >  139   --   132.7   < >  139   POTASSIUM  4.8*   --   4.4   < >  4.4   --   4.3   < >  4.5   CR  1.0   --   0.9   < >  1.00   --   1.0   < >  1.26*   A1C   --   5.7   --    --    --     --   5.8*   --    --     < > = values in this interval not displayed.        IMPRESSION:                                                    Reason for surgery/procedure: R sided inguinal hernia  Diagnosis/reason for consult: Preoperative evaluation    The proposed surgical procedure is considered LOW risk.    REVISED CARDIAC RISK INDEX  The patient has the following serious cardiovascular risks for perioperative complications such as (MI, PE, VFib and 3  AV Block):  No serious cardiac risks  INTERPRETATION: 0 risks: Class 0 (low risk)    The patient has the following additional risks for perioperative complications:  - Hyperkalemia seen in 1/17 - today results slightly increased at 5.3 (likely hemolysis). Will repeat BMP and obtain Urine stidies.  - Hypothyroidism: will follow up with us post-op for further management  - Delirium or Dementia, patient with bipolar disorder, on monthly Abilify injections    RECOMMENDATIONS:                                                      - Patient is to take all scheduled medications on the day of surgery EXCEPT for modifications listed below:   - advised the patient to stop taking his ASA today fo 7 days per-op  - advised to not to take Ibuprofen one day prior to surgery, advised to take Tylenol for pain.    - patient was advised to follow up with us     APPROVAL GIVEN to proceed with proposed procedure     Signed Electronically by: Marika Padron MD    Copy of this evaluation report will be faxed to the [facility] clinic as soon as laboratory results are available.

## 2017-02-08 NOTE — MR AVS SNAPSHOT
After Visit Summary   2/8/2017    Feliciano Ross    MRN: [id number]           Patient Information     Date Of Birth          1950        Visit Information        Provider Department      2/8/2017 10:40 AM Marika Padron MD [facility]        Today's Diagnoses     Preoperative examination    -  1    Preop general physical exam          Care Instructions      Before Your Surgery      Call your surgeon if there is any change in your health. This includes signs of a cold or flu (such as a sore throat, runny nose, cough, rash or fever).    Do not smoke, drink alcohol or take over the counter medicine (unless your surgeon or primary care doctor tells you to) for the 24 hours before and after surgery.    If you take prescribed drugs: Follow your doctor s orders about which medicines to take and which to stop until after surgery.    Eating and drinking prior to surgery: follow the instructions from your surgeon    Take a shower or bath the night before surgery. Use the soap your surgeon gave you to gently clean your skin. If you do not have soap from your surgeon, use your regular soap. Do not shave or scrub the surgery site.  Wear clean pajamas and have clean sheets on your bed.         Follow-ups after your visit        Follow-up notes from your care team     Return in about 4 weeks (around 3/8/2017) for post-op and thyroid check.      Your next 10 appointments already scheduled     Mar 01, 2017  1:20 PM CST   Return Visit with MD [facility] ([facility])    [facility] [address] [place] [address],  [address]  [place] [address]   [phone]              Who to contact     Please call your clinic at [phone] to:    Ask questions about your health    Make or cancel appointments    Discuss your medicines    Learn about your test results    Speak to your doctor   If you have compliments or concerns about an experience at your clinic, or if you  "wish to file a complaint, please contact [facility] Patient Relations at [phone] or email us at [email or web address]         Additional Information About Your Visit        HeartThis Information     HeartThis is an electronic gateway that provides easy, online access to your medical records. With HeartThis, you can request a clinic appointment, read your test results, renew a prescription or communicate with your care team.     To sign up for HeartThis visit the website at [email or web address]   You will be asked to enter the access code listed below, as well as some personal information. Please follow the directions to create your username and password.     Your access code is: [email or web address]  Expires: 2017  7:25 AM     Your access code will  in 90 days. If you need help or a new code, please contact your [facility] or call [phone] for assistance.        Care EveryWhere ID     This is your Care EveryWhere ID. This could be used by other organizations to access your [place] medical records  [id number]        Your Vitals Were     Pulse Temperature Respirations Height Pulse Oximetry BMI (Body Mass Index)    69 97.7  F (36.5  C) (Oral) 18 5' 9\" (175.3 cm) 99% 38.4 kg/m2       Blood Pressure from Last 3 Encounters:   17 108/78   17 117/75   17 114/77    Weight from Last 3 Encounters:   17 260 lb (117.9 kg)   17 260 lb (117.9 kg)   17 258 lb 9.6 oz (117.3 kg)              We Performed the Following     Basic Metabolic Panel ([facility])     EKG 12-LEAD RADIOLOGY     HEMOGLOBIN     INR        Primary Care Provider Office Phone # Fax #    Marika Padorn [phone]       [facility] [address]   [facility] [address]        Thank you!     Thank you for choosing [facility]  for your care. Our goal is always to provide you with excellent care. " Hearing back from our patients is one way we can continue to improve our services. Please take a few minutes to complete the written survey that you may receive in the mail after your visit with us. Thank you!             Your Updated Medication List - Protect others around you: Learn how to safely use, store and throw away your medicines at www.disposemymeds.org.          This list is accurate as of: 2/8/17 11:59 PM.  Always use your most recent med list.                   Brand Name Dispense Instructions for use    * ARIPiprazole 10 MG tablet    ABILIFY    60 tablet    Take 1 tablet (10 mg) by mouth 2 times daily as needed       * ARIPiprazole 400 MG extended release injection    ABILIFY MAINTENA    2 mL    Inject 2 mLs (400 mg) into the muscle every 28 days       aspirin 81 MG EC tablet    aspirin    90 tablet    Take 1 tablet (81 mg) by mouth daily       ibuprofen 400 MG tablet    ADVIL/MOTRIN    30 tablet    Take 1 tablet (400 mg) by mouth every 4 hours as needed for pain       mineral oil-hydrophilic petrolatum     420 g    Apply 1-2 x daily to right leg wound       omeprazole 20 MG tablet     90 tablet    Take 1 tablet (20 mg) by mouth daily Take 30-60 minutes before a meal.       QUEtiapine 50 MG Tb24 24 hr tablet    SEROquel XR     Take 100 mg by mouth       traZODone 50 MG tablet    DESYREL     Take 50 mg by mouth nightly as needed for sleep (May repeat x 1 if needed)       * Notice:  This list has 2 medication(s) that are the same as other medications prescribed for you. Read the directions carefully, and ask your doctor or other care provider to review them with you.

## 2017-02-08 NOTE — PROGRESS NOTES
Preceptor Attestation:   Patient seen and discussed with the resident. Assessment and plan reviewed with resident and agreed upon.   Supervising Physician:  Ani Damian MD  [facility]

## 2017-02-09 ENCOUNTER — OFFICE VISIT (OUTPATIENT)
Dept: FAMILY MEDICINE | Facility: CLINIC | Age: 67
End: 2017-02-09

## 2017-02-09 VITALS
WEIGHT: 260 LBS | BODY MASS INDEX: 38.38 KG/M2 | TEMPERATURE: 97.7 F | RESPIRATION RATE: 16 BRPM | DIASTOLIC BLOOD PRESSURE: 78 MMHG | SYSTOLIC BLOOD PRESSURE: 108 MMHG | HEART RATE: 79 BPM | OXYGEN SATURATION: 96 %

## 2017-02-09 DIAGNOSIS — E87.5 HYPERKALEMIA: Primary | ICD-10-CM

## 2017-02-09 DIAGNOSIS — E03.9 HYPOTHYROIDISM, UNSPECIFIED TYPE: Primary | ICD-10-CM

## 2017-02-09 LAB
OSMOLALITY UR: 409 MMOL/KG (ref 100–1200)
POTASSIUM UR-SCNC: 53 MMOL/L
SODIUM UR-SCNC: 56 MMOL/L

## 2017-02-09 RX ORDER — LEVOTHYROXINE SODIUM 75 UG/1
75 TABLET ORAL
Qty: 30 TABLET | Refills: 0 | Status: SHIPPED
Start: 2017-02-09 | End: 2017-03-14

## 2017-02-09 NOTE — PROGRESS NOTES
Preceptor Attestation:   Patient seen and discussed with the resident. Assessment and plan reviewed with resident and agreed upon.   Supervising Physician:  Khoi Sheffield MD  [facility]

## 2017-02-09 NOTE — TELEPHONE ENCOUNTER
RN called facility where patient resides. Scheduled patient for appointment with PCP today. Staff will inform patient and help arrange patient getting to appointment.    Laquita Su RN

## 2017-02-09 NOTE — MR AVS SNAPSHOT
After Visit Summary   2017    Feliciano Ross    MRN: [id number]           Patient Information     Date Of Birth          1950        Visit Information        Provider Department      2017 4:20 PM Marika Padron MD [facility]        Today's Diagnoses     Hyperkalemia    -  1       Follow-ups after your visit        Your next 10 appointments already scheduled     Mar 01, 2017  1:20 PM CST   Return Visit with MD [facility] ([facility])    [address],  [address]  [place] [address]   [phone]              Who to contact     Please call your clinic at [phone] to:    Ask questions about your health    Make or cancel appointments    Discuss your medicines    Learn about your test results    Speak to your doctor   If you have compliments or concerns about an experience at your clinic, or if you wish to file a complaint, please contact [facility] Patient Relations at [phone] or email us at [email or web address]         Additional Information About Your Visit        MyChart Information     blabfeed is an electronic gateway that provides easy, online access to your medical records. With blabfeed, you can request a clinic appointment, read your test results, renew a prescription or communicate with your care team.     To sign up for blabfeed visit the website at [email or web address]   You will be asked to enter the access code listed below, as well as some personal information. Please follow the directions to create your username and password.     Your access code is: [email or web address]  Expires: 2017  7:25 AM     Your access code will  in 90 days. If you need help or a new code, please contact your [facility] or call [phone] for assistance.        Care EveryWhere ID     This is your Care EveryWhere ID. This could be  used by other organizations to access your [place] medical records  [id number]        Your Vitals Were     Pulse Temperature Respirations Pulse Oximetry BMI (Body Mass Index)       79 97.7  F (36.5  C) (Oral) 16 96% 38.4 kg/m2        Blood Pressure from Last 3 Encounters:   02/09/17 108/78   02/08/17 117/75   01/17/17 114/77    Weight from Last 3 Encounters:   02/09/17 260 lb (117.9 kg)   02/08/17 260 lb (117.9 kg)   01/17/17 258 lb 9.6 oz (117.3 kg)              We Performed the Following     Basic Metabolic Panel ([facility])     Osmolality urine     Potassium random urine     Sodium random urine          Where to get your medicines      These medications were sent to [place] [address] [place] [address]     Phone:  [phone]     levothyroxine 75 MCG tablet          Primary Care Provider Office Phone # Fax #    Marika Padron [phone]       [facility] [address] [facility] [address]        Thank you!     Thank you for choosing [facility]  for your care. Our goal is always to provide you with excellent care. Hearing back from our patients is one way we can continue to improve our services. Please take a few minutes to complete the written survey that you may receive in the mail after your visit with us. Thank you!             Your Updated Medication List - Protect others around you: Learn how to safely use, store and throw away your medicines at www.disposemymeds.org.          This list is accurate as of: 2/9/17 11:59 PM.  Always use your most recent med list.                   Brand Name Dispense Instructions for use    * ARIPiprazole 10 MG tablet    ABILIFY    60 tablet    Take 1 tablet (10 mg) by mouth 2 times daily as needed       * ARIPiprazole 400 MG extended release injection    ABILIFY MAINTENA    2 mL    Inject 2 mLs (400 mg) into the muscle every 28 days       aspirin  81 MG EC tablet    aspirin    90 tablet    Take 1 tablet (81 mg) by mouth daily       ibuprofen 400 MG tablet    ADVIL/MOTRIN    30 tablet    Take 1 tablet (400 mg) by mouth every 4 hours as needed for pain       levothyroxine 75 MCG tablet    SYNTHROID/LEVOTHROID    30 tablet    Take 1 tablet (75 mcg) by mouth every morning (before breakfast)       mineral oil-hydrophilic petrolatum     420 g    Apply 1-2 x daily to right leg wound       omeprazole 20 MG tablet     90 tablet    Take 1 tablet (20 mg) by mouth daily Take 30-60 minutes before a meal.       QUEtiapine 50 MG Tb24 24 hr tablet    SEROquel XR     Take 100 mg by mouth       traZODone 50 MG tablet    DESYREL     Take 50 mg by mouth nightly as needed for sleep (May repeat x 1 if needed)       * Notice:  This list has 2 medication(s) that are the same as other medications prescribed for you. Read the directions carefully, and ask your doctor or other care provider to review them with you.

## 2017-02-09 NOTE — PROGRESS NOTES
HPI:       Feliciano Ross is a 66 year old who presents for the following  Patient presents with:  Follow Up For: lab results, having some dizziness    Patient is here for hyperkalemia follow up. He states being a little wacky in the morning but does not feel that way now.   His hernia is bothering him and he is glad that surgery is due soon. He otherwise does not have any chest pain, chest tightness, no muscle cramps.   Labs reviewed: potassium level 5.3 (from 4.8 in 1/17, normal before), normal Hgb.     Problem, Medication and Allergy Lists were reviewed and are current.  Patient is an established patient of this clinic.         Review of Systems:   Review of Systems   Negative except as noted in HPI       Physical Exam:   Patient Vitals for the past 24 hrs:   BP Temp Temp src Pulse Resp SpO2 Weight   02/09/17 1547 108/78 mmHg 97.7  F (36.5  C) Oral 79 16 96 % 260 lb (117.935 kg)     Body mass index is 38.38 kg/(m^2).  Vitals were reviewed and were normal     Physical Exam   Constitutional: He is oriented to person, place, and time. He appears well-developed. No distress.   HENT:   Head: Normocephalic.   Eyes: Conjunctivae are normal.   Neck: Normal range of motion.   Cardiovascular: Normal rate, regular rhythm, normal heart sounds and intact distal pulses.    No murmur heard.  Pulmonary/Chest: Effort normal and breath sounds normal. No respiratory distress. He has no wheezes.   Abdominal: There is no splenomegaly or hepatomegaly.   Musculoskeletal: He exhibits no edema.   Neurological: He is alert and oriented to person, place, and time.   Skin: Skin is warm and dry. He is not diaphoretic.   Psychiatric: He has a normal mood and affect. His behavior is normal. Judgment and thought content normal.   Vitals reviewed.        Results:     Urin sodium, urine potassium and BMP are pending     Results for orders placed or performed in visit on 02/09/17   Potassium random urine   Result Value Ref Range    Potassium  Urine mmol/L 53 mmol/L   Sodium random urine   Result Value Ref Range    Sodium Urine mmol/L 56 mmol/L   Osmolality urine   Result Value Ref Range    Urine Osmolality 409 100 - 1200 mmol/kg   Basic Metabolic Panel ([facility])   Result Value Ref Range    Urea Nitrogen 21.4 (H) 7.0 - 21.0 mg/dL    Calcium 9.1 8.5 - 10.1 mg/dL    Chloride 96.5 (L) 98.0 - 110.0 mmol/L    Carbon Dioxide 24.5 20.0 - 32.0 mmol/L    Creatinine 1.0 0.7 - 1.3 mg/dL    Glucose 111.6 (H) 70.0 - 99.0 mg'dL    Potassium 4.3 3.3 - 4.5 mmol/dL    Sodium 129.0 (L) 132.6 - 141.4 mmol/L    GFR Estimate 80.0 mL/min/1.7 m2    GFR Estimate If Black 96.7 mL/min/1.7 m2       Assessment and Plan     ## Hyperkalemia, unclear etiology, likely hemolysis.   - Potassium random urine  - Sodium random urine  - Osmolality urine  - Basic Metabolic Panel ([facility])    - sent Pre-op report per Fax ([phone]) to [facility]     There are no discontinued medications.  Options for treatment and follow-up care were reviewed with the patient. Feliciano Ross  engaged in the decision making process and verbalized understanding of the options discussed and agreed with the final plan.    Marika Padron MD  [facility],  G2 Family Medicine Resident  #[id number]

## 2017-02-09 NOTE — TELEPHONE ENCOUNTER
Patient was seen today for per-op for upcomming R hernia repair on 2/15/17. Back in 1/17 slightly elevated Potassium level at 4.8. Rechecked today - trending up:  POTASSIUM   Date Value Ref Range Status   02/08/2017 5.3* 3.3 - 4.5 mmol/dL Final     Patient is asymptomatic. EKG obtained: HR 62, no ST changes.     Assessment:   Unclear potassium elevation. Pt is not on any ACE or ARB, no hyperglycemia, no worsening in kidney function.   Maybe due to hypothyroidism.    Plan:   - patient needs to be evaluated for hyperkalemia cause with further management.  - will route this note to our Triage pool and ask to call patient and advise him to be seen tomorrow, 2/9/17, for potassium recheck. I still have openings for this day.   - I also would check urine K, Na, osm, Serum osm  - would consider to touch base with Nephro.     Marika Padron MD  [facility],  G2 Family Medicine Resident  #[id number]

## 2017-02-10 LAB
BUN SERPL-MCNC: 21.4 MG/DL (ref 7–21)
CALCIUM SERPL-MCNC: 9.1 MG/DL (ref 8.5–10.1)
CHLORIDE SERPLBLD-SCNC: 96.5 MMOL/L (ref 98–110)
CO2 SERPL-SCNC: 24.5 MMOL/L (ref 20–32)
CREAT SERPL-MCNC: 1 MG/DL (ref 0.7–1.3)
GFR SERPL CREATININE-BSD FRML MDRD: 80 ML/MIN/1.7 M2
GLUCOSE SERPL-MCNC: 111.6 MG'DL (ref 70–99)
POTASSIUM SERPL-SCNC: 4.3 MMOL/DL (ref 3.3–4.5)
SODIUM SERPL-SCNC: 129 MMOL/L (ref 132.6–141.4)

## 2017-02-14 NOTE — TELEPHONE ENCOUNTER
I did fax the Pre-op form with EKG and Chest XR results on 2/9/17 to the Fax Nr [phone]. A also called then today to inform about current labs results: left message for nurse coordinator form Dr Regino Frazier.    Since I am not in the clinic today I would ask you to call at [phone] to verify if they have received the fax on 2/9/17.     Marika Padron MD  [facility],  G2 Family Medicine Resident  #[id number]

## 2017-03-01 ENCOUNTER — OFFICE VISIT (OUTPATIENT)
Dept: FAMILY MEDICINE | Facility: CLINIC | Age: 67
End: 2017-03-01

## 2017-03-01 VITALS
BODY MASS INDEX: 39.1 KG/M2 | HEART RATE: 76 BPM | DIASTOLIC BLOOD PRESSURE: 82 MMHG | OXYGEN SATURATION: 97 % | HEIGHT: 69 IN | RESPIRATION RATE: 20 BRPM | WEIGHT: 264 LBS | SYSTOLIC BLOOD PRESSURE: 120 MMHG | TEMPERATURE: 97.7 F

## 2017-03-01 DIAGNOSIS — E87.0 HYPERNATREMIA: Primary | ICD-10-CM

## 2017-03-01 DIAGNOSIS — Z87.19 S/P HERNIA REPAIR: ICD-10-CM

## 2017-03-01 DIAGNOSIS — Z98.890 S/P HERNIA REPAIR: ICD-10-CM

## 2017-03-01 DIAGNOSIS — E03.1 CONGENITAL HYPOTHYROIDISM WITHOUT GOITER: ICD-10-CM

## 2017-03-01 LAB
BUN SERPL-MCNC: 17.6 MG/DL (ref 7–21)
CALCIUM SERPL-MCNC: 9.1 MG/DL (ref 8.5–10.1)
CHLORIDE SERPLBLD-SCNC: 100 MMOL/L (ref 98–110)
CO2 SERPL-SCNC: 27.3 MMOL/L (ref 20–32)
CREAT SERPL-MCNC: 0.9 MG/DL (ref 0.7–1.3)
GFR SERPL CREATININE-BSD FRML MDRD: 90.3 ML/MIN/1.7 M2
GLUCOSE SERPL-MCNC: 154.7 MG'DL (ref 70–99)
POTASSIUM SERPL-SCNC: 4.2 MMOL/DL (ref 3.3–4.5)
SODIUM SERPL-SCNC: 135.6 MMOL/L (ref 132.6–141.4)
T4 FREE SERPL-MCNC: 0.84 NG/DL (ref 0.76–1.46)
TSH SERPL DL<=0.05 MIU/L-ACNC: 3.58 MU/L (ref 0.4–4)

## 2017-03-01 NOTE — PROGRESS NOTES
Preceptor Attestation:   Patient seen and discussed with the resident. Assessment and plan reviewed with resident and agreed upon.   Supervising Physician:  Clem Romeo MD  [facility]

## 2017-03-01 NOTE — LETTER
March 2, 2017      Feliciano Ross  [address]  [place] [address]        Dear Feliciano,    Thank you for getting your care at [facility]. Please see below for your test results. There are all within normal limits. You did a great job taking your medications.     Resulted Orders   TSH   Result Value Ref Range    TSH 3.58 0.40 - 4.00 mU/L   T4 free   Result Value Ref Range    T4 Free 0.84 0.76 - 1.46 ng/dL   Basic Metabolic Panel ([facility])   Result Value Ref Range    Urea Nitrogen 17.6 7.0 - 21.0 mg/dL    Calcium 9.1 8.5 - 10.1 mg/dL    Chloride 100.0 98.0 - 110.0 mmol/L    Carbon Dioxide 27.3 20.0 - 32.0 mmol/L    Creatinine 0.9 0.7 - 1.3 mg/dL    Glucose 154.7 (H) 70.0 - 99.0 mg'dL    Potassium 4.2 3.3 - 4.5 mmol/dL    Sodium 135.6 132.6 - 141.4 mmol/L    GFR Estimate 90.3 mL/min/1.7 m2    GFR Estimate If Black 109.3 mL/min/1.7 m2       If you have any concerns about these results please call and leave a message for me or send a MyChart message to the clinic.    Sincerely,    Marika Padron MD

## 2017-03-01 NOTE — PATIENT INSTRUCTIONS
Here is the plan from today's visit    1. Hypothyroidism without goiter    - TSH to obtain  - T4 free to obtain    2. Hypernatremia  - Basic Metabolic Panel ([facility])    We will call you with results.    Please call or return to clinic if your symptoms don't go away.    Follow up plan: pending thyroid results.     Thank you for coming to [facility] today.  Lab Testing:  **If you had lab testing today and your results are reassuring or normal they will be mailed to you or sent through MentorCloud within 7 days.   **If the lab tests need quick action we will call you with the results.  The phone number we will call with results is # [phone] (home) none (work). If this is not the best number please call our clinic and change the number.  Medication Refills:  If you need any refills please call your pharmacy and they will contact us.   If you need to  your refill at a new pharmacy, please contact the new pharmacy directly. The new pharmacy will help you get your medications transferred faster.   Scheduling:  If you have any concerns about today's visit or wish to schedule another appointment please call our office during normal business hours [phone] (8-5:00 M-F)  If a referral was made to a [facility] and you don't get a call from central scheduling please call [phone].  If a Mammogram was ordered for you at [facility] call [phone] to schedule or change your appointment.  If you had an XRay/CT/Ultrasound/MRI ordered the number is [phone] to schedule or change your radiology appointment.   Medical Concerns:  If you have urgent medical concerns please call [phone] at any time of the day.

## 2017-03-05 ASSESSMENT — ENCOUNTER SYMPTOMS
ABDOMINAL DISTENTION: 0
HEADACHES: 0
CHILLS: 0
VOMITING: 0
ABDOMINAL PAIN: 0
FEVER: 0
NAUSEA: 0
MYALGIAS: 0
DIZZINESS: 0
FATIGUE: 0

## 2017-03-05 NOTE — PROGRESS NOTES
"      HPI:       Feliciano Ross is a 66 year old who presents for the following  Patient presents with:  RECHECK: Follow up, Hernia surgery,2/15/17    Feliciano is here today for follow up after R inguinal hernia repair on 2/15/17, mild electrolyte disturbance and for thyroid disease follow up. He is doing very well, no complaints today. Currently taking Ibuprofen as needed for mild to moderate pain. He is taking 75 mcg levothyroxine. TSH in 1/17 was elevated at 15.     Problem, Medication and Allergy Lists were reviewed and are current.  Patient is an established patient of this clinic.         Review of Systems:   Review of Systems   Constitutional: Negative for chills, fatigue and fever.   Cardiovascular: Negative for chest pain.   Gastrointestinal: Negative for abdominal distention, abdominal pain, nausea and vomiting.   Endocrine: Negative for cold intolerance and heat intolerance.   Musculoskeletal: Negative for myalgias.   Neurological: Negative for dizziness (none today, occasional when changing position from supine) and headaches.   All other systems reviewed and are negative.            Physical Exam:   /82  Pulse 76  Temp 97.7  F (36.5  C) (Oral)  Resp 20  Ht 5' 9\" (175.3 cm)  Wt 264 lb (119.7 kg)  SpO2 97%  BMI 38.99 kg/m2    Body mass index is 38.99 kg/(m^2).  Vitals were reviewed and were normal     Physical Exam   Constitutional: He is oriented to person, place, and time. He appears well-developed and well-nourished. No distress.   HENT:   Head: Normocephalic and atraumatic.   Eyes: Conjunctivae are normal.   Neck: Normal range of motion. Neck supple. No thyromegaly present.   Cardiovascular: Normal rate, regular rhythm and normal heart sounds.    No murmur heard.  Pulmonary/Chest: Effort normal and breath sounds normal.   Abdominal: Soft. Bowel sounds are normal. He exhibits no distension. There is no tenderness. There is no rebound and no guarding.   R inguinal hernal incision c/d/i "   Musculoskeletal: Normal range of motion.   Lymphadenopathy:     He has no cervical adenopathy.   Neurological: He is alert and oriented to person, place, and time.   Skin: Skin is warm and dry. He is not diaphoretic.   Psychiatric: He has a normal mood and affect. His behavior is normal. Judgment and thought content normal.         Results:     TSH with T4 obtained.   Assessment and Plan     1. Hypothyroidism without goiter    - TSH to obtain  - T4 free to obtain    2. Hypernatremia  - Basic Metabolic Panel ([facility])    3. Status R inguinal hernia repair on 2/15/17, doing well, occasional mild pain.  - follow up with surgeon Dr Frazier this week as per patient    Follow up plan: pending thyroid results.     There are no discontinued medications.  Options for treatment and follow-up care were reviewed with the patient. Feliciano Ross  engaged in the decision making process and verbalized understanding of the options discussed and agreed with the final plan.    Marika Padron MD  [facility],  G2 Family Medicine Resident  #[id number]

## 2017-03-14 DIAGNOSIS — E03.9 HYPOTHYROIDISM, UNSPECIFIED TYPE: ICD-10-CM

## 2017-03-14 RX ORDER — LEVOTHYROXINE SODIUM 75 UG/1
75 TABLET ORAL
Qty: 30 TABLET | Refills: 3 | Status: SHIPPED
Start: 2017-03-14 | End: 2021-09-20

## 2017-03-14 NOTE — TELEPHONE ENCOUNTER
[facility] phone call message- patient requesting a refill:    Full Medication Name: levothyroxine (SYNTHROID/LEVOTHROID) 75 MCG tablet      Pharmacy confirmed as   [facility] [address] [facility]  [address] [facility] [address]  Phone: [phone] Fax: [phone]  : Yes    Additional Comments: Patient only has 4 pills left, please refill ASAP. Thank you!     OK to leave a message on voice mail? Yes    Primary language: English      needed? No    Call taken on March 14, 2017 at 11:01 AM by Siddhartha Glass

## 2017-03-14 NOTE — TELEPHONE ENCOUNTER
Request for medication refill:    Date of last visit at clinic: 03/01/2016    Please complete refill if appropriate and CLOSE ENCOUNTER.    Closing the encounter signifies the refill is complete.    If refill has been denied, please complete the smart phrase .smirefuse and route it to the [facility] RN TRIAGE pool to inform the patient and the pharmacy.    Leslie Dias, CMA

## 2017-04-05 ENCOUNTER — TELEPHONE (OUTPATIENT)
Dept: FAMILY MEDICINE | Facility: CLINIC | Age: 67
End: 2017-04-05

## 2017-04-05 NOTE — TELEPHONE ENCOUNTER
[facility] phone call message- patient requesting to speak with PCP or provider:    PCP: Marika Padron    Additional Comments: Christiano, the patient's [facility] Care Coordinator is calling to inform us that the patient has finished treatment on 3/31 and is currently living independently. He does not having housing at the moment but is looking.     Is a call back needed? No    Patient informed that it may take up to 2 business days to hear back from PCP:No    OK to leave a message on voice mail? Yes    Primary language: English      needed? No    Call taken on April 5, 2017 at 1:51 PM by Rosa Werner

## 2017-06-26 ENCOUNTER — DOCUMENTATION ONLY (OUTPATIENT)
Dept: FAMILY MEDICINE | Facility: CLINIC | Age: 67
End: 2017-06-26

## 2017-07-13 NOTE — PROGRESS NOTES
Visit to the client's clinic for initial health risk assessment.  An  was not needed. Client is staying in [place] with his sister because he is homeless. Client was in town for a dental appt so we met in a conference space at [facility].     Current situation/living environment/hospitalization: As stated above client is homeless. He was evicted from his apartment of 15 years in December and stayed at [place] [facility] for 90 days. Since that time he has been staying with his sister in [place]. Client's sister Rachell has been having some serious health issues recently and is worried that if something happens to her, client will be on the streets. Client has a son and another brother in the [facility] area but when I asked if it would be possible if he stayed with one of them I was told no and no applanation was given. Client has a  from [facility] that is working on housing for him.     Activities of daily living (ADL)/instrumental activities of daily living (IADL) and functional issues: Client is independent in all ADL's and IADL's except his sister makes sure his checks are made out correctly. 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 Rachell states she has him do what he can and client states he enjoys helping.     Health concerns/updates: No health concerns today. Client denies pain. He said he feels really good since his hernia repair surgery in February. Client was able to review medications, when he takes them and why he takes them.     Cognition/mental health: Client states for the most part his memory is in tact. He said at times he may forget things. At this time MH seems to be stable. Client sees psychology monthly for Abilify injection. Clients sister agrees that he has been stable and she ensures he takes his medications which she reports he always does.       Client's Plan of Care consists of:  Transitional services when housing is  found      Stephanie Napoles, RN  [facility] Care Coordinator  [phone]

## 2017-07-13 NOTE — PROGRESS NOTES
Visit to the client's clinic for initial health risk assessment.  An  was not needed. Client is staying in [place] with his sister because he is homeless. Client was in town for a dental appt so we met in a conference space at [facility].     Current situation/living environment/hospitalization: As stated above client is homeless. He was evicted from his apartment of 15 years in December and stayed at [place] for 90 days. Since that time he has been staying with his sister in [place]. Client's sister Rachell has been having some serious health issues recently and is worried that if something happens to her, client will be on the streets. Client has a son and another brother in the [facility] area but when I asked if it would be possible if he stayed with one of them I was told no and no applanation was given. Client has a  from [facility] that is working on housing for him.     Activities of daily living (ADL)/instrumental activities of daily living (IADL) and functional issues: Client is independent in all ADL's and IADL's except his sister makes sure his checks are made out correctly. 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 Rachell states she has him do what he can and client states he enjoys helping.     Health concerns/updates: No health concerns today. Client denies pain. He said he feels really good since his hernia repair surgery in February. Client was able to review medications, when he takes them and why he takes them.     Cognition/mental health: Client states for the most part his memory is in tact. He said at times he may forget things. At this time MH seems to be stable. Client sees psychology monthly for Abilify injection. Clients sister agrees that he has been stable and she ensures he takes his medications which she reports he always does.       Client's Plan of Care consists of:  Transitional services when housing is  found      Stephanie Napoles, RN  [facility] Care Coordinator  [phone]

## 2018-05-10 ENCOUNTER — DOCUMENTATION ONLY (OUTPATIENT)
Dept: FAMILY MEDICINE | Facility: CLINIC | Age: 68
End: 2018-05-10

## 2018-05-30 NOTE — PROGRESS NOTES
Visit to the client's home for annual health risk assessment.  An  was not needed.    Current situation/living environment  Client is renting a room in the basement of a friend's home. He has known this friend for many years. The home is neat and clean and appears well cared for. Client is not happy living there as he would like to find his own apartment in [place] again. He has been working with his mental health CM as well as a staff from [facility] to find housing but this has proven difficult given his unlawful detainer.     Activities of daily living (ADL)/instrumental activities of daily living (IADL) and functional issues  Client needs help with the following ADL's: n/a-client is independent  Client needs help with the following IADL's: managing finances/bills  Client states he is independent with managing his money at this time but sister does have POA as client has needed more oversight in the past.      Health concerns for today  Client states he is doing well overall. He has not  Been in to see his PCP for more than a year. He does not feel there is a need as he sees his psychiatrist every 6 months. Client is very resistant to going in for any preventative screenings/exams and states he will go in if/when he has a need.  Has patient fallen 2 or more times in the last year? No  Has patient fallen with injury in the last year? No    Cognition/mental health  Client is alert and oriented. He feels his mental health has been stable over the past year. He is not taking any oral meds but is taking his Abilify injection monthly. It is administered by a home care nurse. Client states his psychiatrist is aware that he is not taking oral meds and is fine with that as long as he is compliant with the injection.     STARS/Med Adherence  Client is non-compliant with the following quality measures: Colon cancer screening  Comments: education efforts but client declines.    Client's Plan of Care consists  of:  Bus passes  Mental Health Case Management    Tameka Martines, RN  [facility] Care Coordinator  [phone]

## 2018-12-18 ENCOUNTER — OFFICE VISIT (OUTPATIENT)
Dept: FAMILY MEDICINE | Facility: CLINIC | Age: 68
End: 2018-12-18
Payer: COMMERCIAL

## 2018-12-18 DIAGNOSIS — H90.6 MIXED CONDUCTIVE AND SENSORINEURAL HEARING LOSS OF BOTH EARS: ICD-10-CM

## 2018-12-18 DIAGNOSIS — Z00.00 PREVENTATIVE HEALTH CARE: Primary | ICD-10-CM

## 2018-12-18 NOTE — PROCEDURES
Feliciano Ross      1.  Has the patient received the information for the influenza vaccine? YES    2.  Does the patient have any of the following contraindications?     Allergy to eggs? No     Allergic reaction to previous influenza vaccines? No     Any other problems to previous influenza vaccines? No     Paralyzed by Guillain-Abbeville syndrome? No     Currently pregnant? NO     Current moderate or severe illness? No    Vaccination given by Jacob Kilgore CMA on 12/18/2018 at 2:07 PM  .  Recorded by Jacob Kilgore

## 2018-12-18 NOTE — PROGRESS NOTES
BRENNAN Ross is a 68 year old  who presents for   Chief Complaint   Patient presents with     Gradual Hearing Loss     wants referral     Patient is a 68-year-old gentleman who is new to myself and has been followed up here in the past now presenting to clinic for audiology referral.  She has been using hearing assistance devices for several years now and recently have been malfunctioning.  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      After review of his medical history, he has been diagnosed with subclinical hypothyroidism along with depressive disorder and was undergoing therapy with Synthroid and Abilify however was discontinued according to patient after he began to follow-up in [facility].  Last set of laboratory tests on file were from March 2017 and it would be now due for a set of blood tests.  He denies any complaints and states he is very active and decided to continue to hold all medications.  At this time he would like to obtain influenza vaccine along with referral for audiology.    Denies any recent nausea, vomiting, fever, chills, shortness of breath, weight loss, or any other complaints.    +++++++      Problem, Medication and Allergy Lists were      Patient Active Problem List    Diagnosis Date Noted     SIADH (syndrome of inappropriate ADH production) (H) 11/11/2016     Priority: Medium     Right inguinal hernia 11/01/2016     Priority: Medium     Hypothyroidism 09/22/2016     Priority: Medium     Manic behavior (H) 06/16/2016     Priority: Medium     Chronic hepatitis C without hepatic coma (H) 05/02/2016     Priority: Medium     SK (seborrheic keratosis) 09/16/2015     Priority: Medium     Cherry hemangioma 09/16/2015     Priority: Medium     Dermatofibroma 09/16/2015     Priority: Medium     Obesity 12/03/2014     Priority: Medium     Gastritis 09/09/2014     Priority: Medium     Paranoid  schizophrenia (H) 12/11/2012     Priority: Medium         Current Outpatient Medications   Medication Sig Dispense Refill     ARIPiprazole (ABILIFY MAINTENA) 400 MG extended release injection Inject 2 mLs (400 mg) into the muscle every 28 days 2 mL 0     ARIPiprazole (ABILIFY) 10 MG tablet Take 1 tablet (10 mg) by mouth 2 times daily as needed 60 tablet 0     aspirin (ASPIRIN) 81 MG EC tablet Take 1 tablet (81 mg) by mouth daily (Patient not taking: Reported on 12/18/2018) 90 tablet 6     ibuprofen (ADVIL/MOTRIN) 400 MG tablet Take 1 tablet (400 mg) by mouth every 4 hours as needed for pain (Patient not taking: Reported on 12/18/2018) 30 tablet 0     levothyroxine (SYNTHROID/LEVOTHROID) 75 MCG tablet Take 1 tablet (75 mcg) by mouth every morning (before breakfast) (Patient not taking: Reported on 12/18/2018) 30 tablet 3     mineral oil-hydrophilic petrolatum (AQUAPHOR) ointment Apply 1-2 x daily to right leg wound (Patient not taking: Reported on 12/18/2018) 420 g 1     omeprazole 20 MG tablet Take 1 tablet (20 mg) by mouth daily Take 30-60 minutes before a meal. (Patient not taking: Reported on 12/18/2018) 90 tablet 1     QUEtiapine (SEROQUEL XR) 50 MG TB24 Take 100 mg by mouth       traZODone (DESYREL) 50 MG tablet Take 50 mg by mouth nightly as needed for sleep (May repeat x 1 if needed)            Allergies   Allergen Reactions     Penicillins Other (See Comments)     Heats up around sight of injection   .    Patient is   an established patient of this clinic.  Past Medical History:   Diagnosis Date     Depressive disorder      Manic affective disorder with recurrent episode (H)      Subclinical hypothyroidism 5/27/2016     Family History   Problem Relation Age of Onset     Diabetes Mother         Type 2     Diabetes Brother         Type 2     Breast Cancer No family hx of      Cancer - colorectal No family hx of      Prostate Cancer No family hx of      Hypertension No family hx of      C.A.D. No family hx of       Cancer No family hx of      Respiratory No family hx of      Social History     Socioeconomic History     Marital status: Unknown     Spouse name: None     Number of children: None     Years of education: None     Highest education level: None   Social Needs     Financial resource strain: None     Food insecurity - worry: None     Food insecurity - inability: None     Transportation needs - medical: None     Transportation needs - non-medical: None   Occupational History     None   Tobacco Use     Smoking status: Former Smoker     Packs/day: 1.50     Years: 50.00     Pack years: 75.00     Types: Cigarettes     Smokeless tobacco: Former User     Quit date: 06/2018   Substance and Sexual Activity     Alcohol use: Yes     Comment: 1-2 beers miriam 3 months     Drug use: No     Comment: used cocaine--last time was year ago.      Sexual activity: No   Other Topics Concern     Parent/sibling w/ CABG, MI or angioplasty before 65F 55M? No   Social History Narrative     None   .         Review of Systems:   Review of Systems  Skin: negative except as above  Respiratory: negative except as above  Cardiovascular: negative except as above  Gastrointestinal: negative except as above  Genitourinary: negative except as above  Musculoskeletal: negative except as above  Neurologic: negative except as above  Psychiatric: negative except as above  Hematologic/Lymphatic/Immunologic: negative except as above  Endocrine: negative except as above         Physical Exam:   There were no vitals filed for this visit.  There is no height or weight on file to calculate BMI.  Vitals were reviewed and were normal     Physical Exam  Constitutional: Oriented to person, place, and time. Appears well-developed and well-nourished.   HENT:   Head: Normocephalic and atraumatic.   Eyes: Conjunctivae are normal.   Ears: Normal external ear.    Neck: Normal range of motion.   Cardiovascular: Normal rate, regular rhythm, normal heart sounds and intact  distal pulses.    Pulmonary/Chest: Effort normal normal. No respiratory distress. No wheezes.   Neurological: Alert and oriented to person, place, and time.   Skin: Skin is warm and dry.   Psychiatric: Has a normal mood and affect. Behavior is normal.       Results:   No testing ordered today    Assessment and Plan          1. Mixed conductive and sensorineural hearing loss of both ears  - AUDIOLOGY ADULT REFERRAL    2. Routine health maintenance  - Influenza vaccine today  - Will return to clinic in 3 months for routine labs and studies (BMP, lipid screen, and AAA screen) and repeat thyroid function studies (ALL LABS AND STUDIES ORDERED FUTURE).   - Would benefit from referral of CT-chest as he is a previous smoker and quit several months ago.     Please call or return to clinic if your symptoms don't go away.    Follow up plan  Please make a clinic appointment for follow up with your primary physician Dylan Underwood MD in 1-3 months.     Thank you for coming to [facility] today.       There are no discontinued medications.    Options for treatment and follow-up care were reviewed with the patient. Feliciano Ross  engaged in the decision making process and verbalized understanding of the options discussed and agreed with the final plan.    Dylan Underwood MD

## 2018-12-18 NOTE — PROGRESS NOTES
Preceptor Attestation:   Patient seen, evaluated and discussed with the resident. I have verified the content of the note, which accurately reflects my assessment of the patient and the plan of care.   Supervising Physician:  Jenniffer Davidson MD

## 2018-12-18 NOTE — PATIENT INSTRUCTIONS
Here is the plan from today's visit    1. Mixed conductive and sensorineural hearing loss of both ears  - AUDIOLOGY ADULT REFERRAL    2. Preventative health care  - Influenza vaccine today  - Will return to clinic in 3 months for routine labs and repeat thyroid function studies.   - ADMIN VACCINE, INITIAL  - TSH with free T4 reflex; Future  - Basic Metabolic Panel ([facility]); Future  - US abdominal aorta limited; Future    Please call or return to clinic if your symptoms don't go away.    Follow up plan  Please make a clinic appointment for follow up with your primary physician Dylan Underwood MD as soon as possible to repeat labs and routine maintenance.     Thank you for coming to [facility] today.  Lab Testing:  **If you had lab testing today and your results are reassuring or normal they will be mailed to you or sent through [facility] within 7 days.   **If the lab tests need quick action we will call you with the results.  The phone number we will call with results is # [phone] (home) none (work). If this is not the best number please call our clinic and change the number.  Medication Refills:  If you need any refills please call your pharmacy and they will contact us.   If you need to  your refill at a new pharmacy, please contact the new pharmacy directly. The new pharmacy will help you get your medications transferred faster.   Scheduling:  If you have any concerns about today's visit or wish to schedule another appointment please call our office during normal business hours [phone] (8-5:00 M-F)  If a referral was made to a [facility] and you don't get a call from central scheduling please call [phone].  If a Mammogram was ordered for you at [facility] call [phone] to schedule or change your appointment.  If you had an XRay/CT/Ultrasound/MRI ordered the number is [phone] to schedule or change your radiology appointment.   Medical Concerns:  If  you have urgent medical concerns please call [phone] at any time of the day.    Dylan Underwood MD    Audiology referral    Kaya Brown sent to Adriana Wright,    I just tried to contact the patient at the numbers in his chart and neither are correct.   It's possible we tried when the referral was issued and unable to contact the patient.   I do know that the referral was not assigned to the  Outside Orders work queue.     ThanksKaya    Previous Messages      ----- Message -----   From: Adriana Wright   Sent: 1/17/2019   8:49 AM   To: Referral Specialist Team   Subject: Audiology referral                               Good Morning,     Can you please see Audiology referral written 12/18/18 and let me know status of appointment for my referring provider? Thank you!     Adriana Parks's         Sent letter to patient home.

## 2019-01-25 ENCOUNTER — OFFICE VISIT (OUTPATIENT)
Dept: AUDIOLOGY | Facility: CLINIC | Age: 69
End: 2019-01-25
Payer: COMMERCIAL

## 2019-01-25 DIAGNOSIS — H90.3 SENSORINEURAL HEARING LOSS, ASYMMETRICAL: Primary | ICD-10-CM

## 2019-01-25 NOTE — PROGRESS NOTES
"AUDIOLOGY REPORT    SUBJECTIVE:  Feliciano Ross is a 68 year old male who was seen in the Audiology Clinic at the [facility] for audiologic evaluation, referred by Jenniffer Davidson MD.  The patient reports a gradual decrease in hearing status bilaterally.  He reports he previously had a set of In-The-Ear hearing aids obtained from [facility] but that he did not like wearing them because they were \"too big and made noise\", he also did not like changing the batteries.  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 The patient denies bilateral otalgia, bilateral drainage, bilateral aural fullness, and dizziness.  The patient notes difficulty with communication in a variety of listening situations.      OBJECTIVE:  Fall Risk Screen:  1. Have you fallen two or more times in the past year? No  2. Have you fallen and had an injury in the past year? No    Timed Up and Go Score (in seconds): not tested  Is patient a fall risk? No  Referral initiated: No  Fall Risk Assessment Completed by Audiology    Otoscopic exam indicates cerumen in the right ear.  Using a lighted curette the provider was able to remove the cerumen from the right ear without incident. Following cerumen removal, both eardrums could be visualized.    Pure Tone Thresholds assessed using conventional audiometry with good  reliability from 250-8000 Hz bilaterally using insert earphones and circumaural headphones     RIGHT:  Moderate to severe sensorineural hearing loss    LEFT:    Mild to severe sensorineural hearing loss    Tympanogram:    RIGHT: Hypermobility    LEFT:   normal eardrum mobility    Reflexes (reported by stimulus ear):  RIGHT: Ipsilateral is present at normal levels  RIGHT: Contralateral is present at normal levels  LEFT:   Ipsilateral is present at normal levels  LEFT:   Contralateral is present at normal levels    Speech Reception Threshold:    " "RIGHT: 60 dB HL    LEFT:   50 dB HL    Word Recognition Score:     RIGHT: 40% at 90 dB HL using NU-6 recorded word list (attempted louder sensation level but patient reported distortion)    LEFT:   68% at 90 dB HL using NU-6 recorded word list.      ASSESSMENT:     Today's results revealed a bilateral asymmetric sensorineural hearing loss. Today s results were discussed with the patient in detail.     Patient stated that he would like new hearing aids but that he wanted \"the small ones\". Discussed that the very tiny hearing aids he wants are not appropriate for his amount of hearing loss, he expressed understanding.  Patient wanted \"battery free\" hearing aids, reviewed that all hearing aids run on batteries but that now there are rechargeable battery options, he was interested in that option. Realistic expectations regarding hearing aid use given the word recognition scores was also reviewed and he expressed understanding.      PLAN:  Patient was counseled regarding hearing loss and impact on communication.  Patient is a candidate for amplification at this time. It is recommended that the patient follow-up with ENT regarding the asymmetric hearing loss and word recognition scores as well as to obtain medical clearance for hearing aids.  A trial with amplification was recommended, the process for obtaining hearing aids was reviewed (hearing aid consultation, fitting, follow-ups etc...). Patient did not wish to set up hearing aid appointments or an ENT appointment at this clinic as it can be challenging for him to get here due to rides.  He expressed that he would like to follow-up with another clinic closer to [place]. Discussed with patient that he is welcome to return to this clinic should he change his mind.  Lastly, the patient should monitor his hearing status annually, sooner if concerns arise.  Please call this clinic with questions regarding these results or recommendations.        Yomaira Art, " Yvette  Audiologist  [place] License  #[id number]        CC: Dr. Jenniffer Davidson

## 2019-04-08 ENCOUNTER — DOCUMENTATION ONLY (OUTPATIENT)
Dept: FAMILY MEDICINE | Facility: CLINIC | Age: 69
End: 2019-04-08

## 2019-04-08 NOTE — PROGRESS NOTES
Visit to the client's home for annual health risk assessment.  An  was not needed.    Current situation/living environment  Client is living with a friend and his family in a single family home in [place]. The home is neat and tidy and appears well cared for. Client was on a couple wait lists for housing but has turned down 2. One he turned down as it was not on a bus line. The other he turned down as he does not want to live in [place] or in assisted living. He is currently looking for a house to share with a friend in [place].     Activities of daily living (ADL)/instrumental activities of daily living (IADL) and functional issues  Client needs help with the following ADL's: n/a- clt is independent with ADLs.  Client needs help with the following IADL's: transportation and setting up appts   Client states he is unable to perform the above due to being [place] and needing help with transportation. Client has [facility] services in place for help with IADLs (getting mail picked up, scheduling appts/transportation, help with housing.      Health concerns for today  Client denies any health concerns. He states his health is excellent and feels he is doing very well.  Has patient fallen 2 or more times in the last year? No  Has patient fallen with injury in the last year? No    Cognition/mental health  Client is alert and oriented. He denies any trouble with his memory or forgetfulness. He states that since he stopped his psych meds he remembers everything now. He denies any MH symptoms. He is still meeting with his MH CM monthly and his psychiatrist q3-6 months.    STARS/Med Adherence  Client is non-compliant with the following quality measures: Colon cancer screening  Comments: education efforts-client declines    Client's Plan of Care consists of:  Bus passes,  ICLS (4 hrs/week)    Tameka Martines, TAVO  [facility] Care Coordinator  [phone]

## 2020-03-19 ENCOUNTER — DOCUMENTATION ONLY (OUTPATIENT)
Dept: FAMILY MEDICINE | Facility: CLINIC | Age: 70
End: 2020-03-19

## 2020-03-30 NOTE — PROGRESS NOTES
Visit done by phone due to COVID-19  to the client's hospital for annual health risk assessment.  An  was not needed.    Current situation/living environment  Client is currently on commitment and in [facility] following a couple month long period of psychosis.Client had not been taking any psych meds for past year and had not followed-up with his psychiatrist in some time. Unfortunately his MH had been stable for some time and MH case management had stopped because of this. Client is now in hospital, ready to discharge, but due to wait lists and places not taking admits during coronavirus he is stuck waiting in the hospital. Plan is for him to discharge to an assisted living facility.     Activities of daily living (ADL)/instrumental activities of daily living (IADL) and functional issues  Client needs help with the following ADL's: dressing, grooming and bathing  Client needs help with the following IADL's: shopping, cooking, housekeeping, laundry and transportation  Client states he is unable to perform the above due to poor follow-through. Needs oversight and supervision to ensure things are done. Will have assistance from AL.      Health concerns for today  Client's MH is more stable with daily med. Has been compliant with thyroid and MH meds.  Has patient fallen 2 or more times in the last year? No  Has patient fallen with injury in the last year? No    Cognition/mental health  Client is alert and oriented x3 at this time. Has had delusional thoughts and has reported hearing voices. He denies these at this time.    STARS/Med Adherence  Client is non-compliant with the following quality measures: Colon cancer screening  Comments: education efforts    Client's Plan of Care consists of:  Bus pass and 24hr assisted living    Tameka Martines RN  [facility] Care Coordinator  [phone]

## 2020-04-27 ENCOUNTER — TELEPHONE (OUTPATIENT)
Dept: FAMILY MEDICINE | Facility: CLINIC | Age: 70
End: 2020-04-27

## 2020-04-27 DIAGNOSIS — Z74.1 REQUIRES ASSISTANCE WITH ACTIVITIES OF DAILY LIVING (ADL): Primary | ICD-10-CM

## 2020-04-27 NOTE — TELEPHONE ENCOUNTER
[facility] phone call message - order or referral request from patient:     Order or referral being requested: Requested order: over the bed table.    Additional Details: Tameka [facility] , calling to advise that patient needs an over the bed table. Per Tameka, DME supplier ([facility]) has sent order to [facility], but has been advised that patient is not a patient of [facility]. Patient is currently in an assisted living facility under commitment.    If provider would like to call order in to [facility], can speak with Radha, phone number [phone].    OK to leave a message on voice mail? Yes    Primary language: English      needed? No    Call taken on April 27, 2020 at 11:59 AM by Preeti Mckeon    Order request route to [facility] TRIAGE   Referrals Route to [facility] (Green/[place]/Purple) CARE COORDINATOR

## 2020-04-28 NOTE — TELEPHONE ENCOUNTER
DME order placed for over the bed table and faxed to DME supplier, Joseph Lucas MD  [facility]  PGY2, [phone]

## 2021-08-18 ENCOUNTER — DOCUMENTATION ONLY (OUTPATIENT)
Dept: FAMILY MEDICINE | Facility: CLINIC | Age: 71
End: 2021-08-18

## 2021-08-18 NOTE — PROGRESS NOTES
"When opening a documentation only encounter, be sure to enter in \"Chief Complaint\" Forms and in \" Comments\" Title of form, description if needed.    Feliciano is a 71 year old  male  Form received via: Fax  Form now resides in: Provider Ready    Victoria Orellana MA                  "

## 2021-09-07 ENCOUNTER — OFFICE VISIT (OUTPATIENT)
Dept: FAMILY MEDICINE | Facility: CLINIC | Age: 71
End: 2021-09-07
Payer: COMMERCIAL

## 2021-09-07 VITALS
BODY MASS INDEX: 41.07 KG/M2 | WEIGHT: 271 LBS | TEMPERATURE: 97.6 F | RESPIRATION RATE: 16 BRPM | HEART RATE: 75 BPM | DIASTOLIC BLOOD PRESSURE: 84 MMHG | SYSTOLIC BLOOD PRESSURE: 122 MMHG | OXYGEN SATURATION: 95 % | HEIGHT: 68 IN

## 2021-09-07 DIAGNOSIS — Z76.89 ENCOUNTER TO ESTABLISH CARE: Primary | ICD-10-CM

## 2021-09-07 DIAGNOSIS — E03.9 HYPOTHYROIDISM, UNSPECIFIED TYPE: ICD-10-CM

## 2021-09-07 DIAGNOSIS — E11.9 TYPE 2 DIABETES MELLITUS WITHOUT COMPLICATION, UNSPECIFIED WHETHER LONG TERM INSULIN USE (H): ICD-10-CM

## 2021-09-07 LAB
HBA1C MFR BLD: 7 % (ref 0–5.6)
TSH SERPL DL<=0.005 MIU/L-ACNC: 4.89 MU/L (ref 0.4–4)

## 2021-09-07 PROCEDURE — 99214 OFFICE O/P EST MOD 30 MIN: CPT | Mod: 25 | Performed by: STUDENT IN AN ORGANIZED HEALTH CARE EDUCATION/TRAINING PROGRAM

## 2021-09-07 PROCEDURE — 90715 TDAP VACCINE 7 YRS/> IM: CPT | Performed by: STUDENT IN AN ORGANIZED HEALTH CARE EDUCATION/TRAINING PROGRAM

## 2021-09-07 PROCEDURE — 90471 IMMUNIZATION ADMIN: CPT | Mod: 59 | Performed by: STUDENT IN AN ORGANIZED HEALTH CARE EDUCATION/TRAINING PROGRAM

## 2021-09-07 PROCEDURE — 90732 PPSV23 VACC 2 YRS+ SUBQ/IM: CPT | Performed by: STUDENT IN AN ORGANIZED HEALTH CARE EDUCATION/TRAINING PROGRAM

## 2021-09-07 PROCEDURE — 84443 ASSAY THYROID STIM HORMONE: CPT | Performed by: STUDENT IN AN ORGANIZED HEALTH CARE EDUCATION/TRAINING PROGRAM

## 2021-09-07 PROCEDURE — 36415 COLL VENOUS BLD VENIPUNCTURE: CPT | Performed by: STUDENT IN AN ORGANIZED HEALTH CARE EDUCATION/TRAINING PROGRAM

## 2021-09-07 PROCEDURE — 83036 HEMOGLOBIN GLYCOSYLATED A1C: CPT | Performed by: STUDENT IN AN ORGANIZED HEALTH CARE EDUCATION/TRAINING PROGRAM

## 2021-09-07 PROCEDURE — 90472 IMMUNIZATION ADMIN EACH ADD: CPT | Mod: 59 | Performed by: STUDENT IN AN ORGANIZED HEALTH CARE EDUCATION/TRAINING PROGRAM

## 2021-09-07 RX ORDER — ACETAMINOPHEN 500 MG
TABLET ORAL
COMMUNITY
Start: 2021-05-27 | End: 2023-01-17

## 2021-09-07 RX ORDER — GLIPIZIDE 5 MG/1
TABLET, FILM COATED, EXTENDED RELEASE ORAL
COMMUNITY
Start: 2021-08-27 | End: 2021-11-11

## 2021-09-07 RX ORDER — GLIPIZIDE 5 MG/1
TABLET, FILM COATED, EXTENDED RELEASE ORAL
COMMUNITY
Start: 2021-05-07 | End: 2021-09-07 | Stop reason: ALTCHOICE

## 2021-09-07 RX ORDER — METFORMIN HCL 500 MG
TABLET, EXTENDED RELEASE 24 HR ORAL
Qty: 21 TABLET | Refills: 0 | Status: SHIPPED | OUTPATIENT
Start: 2021-09-07 | End: 2021-09-20

## 2021-09-07 RX ORDER — GLIPIZIDE 2.5 MG/1
TABLET, EXTENDED RELEASE ORAL
COMMUNITY
Start: 2020-12-22 | End: 2021-09-07 | Stop reason: ALTCHOICE

## 2021-09-07 RX ORDER — BLOOD-GLUCOSE METER
EACH MISCELLANEOUS
COMMUNITY
Start: 2021-02-23 | End: 2023-06-27

## 2021-09-07 RX ORDER — AMPICILLIN TRIHYDRATE 500 MG
CAPSULE ORAL
COMMUNITY
Start: 2021-01-22 | End: 2021-10-28

## 2021-09-07 ASSESSMENT — MIFFLIN-ST. JEOR: SCORE: 1961.75

## 2021-09-07 NOTE — PROGRESS NOTES
"    Assessment & Plan     Encounter to establish care  Pt here to establish care as moving from [place] to move in with friends family. Noting patient has history of Hep C, unclear if every treated, will need to look into this and consider if eligible for treatment.   - TDAP VACCINE (Adacel, Boostrix)  [[id number]]  - Pneumococcal vaccine 23 valent PPSV23  (Pneumovax) [46427]    Type 2 diabetes mellitus without complication, unspecified whether long term insulin use (H)  Pt has been on glipizide and recently increased lantus to 52 units, however patient did not want to split dosing even after explaining how this is more effective when we get to 50 units. Pt had never been on metformin, unclear why never tried. Will start this visit with hopes to decrease lantus needs. Would consider stopping glipizide and switching to another mediation such as a GLP-1 (for weight loss benefit) or SLGT2i  - metFORMIN (GLUCOPHAGE-XR) 500 MG 24 hr tablet; Take 1 tablet (500 mg) by mouth daily (with dinner) for 7 days, THEN 1 tablet (500 mg) 2 times daily (with meals) for 7 days.  - Hemoglobin A1c; Future  - insulin glargine (LANTUS PEN) 100 UNIT/ML pen; Inject 52 Units Subcutaneous At Bedtime  - Hemoglobin A1c    Hypothyroidism, unspecified type  Pt with hypothyroidism on 75 mcg of Synthroid, will get testing as new to system. Per patient is taking this medication with his other medications, discussed how need to take it separately at least 60 min apart for improved effect.   - TSH; Future  - TSH    Review of prior external note(s) and [facility]  Assessment requiring an independent historian(s) - friend      BMI:   Estimated body mass index is 41.75 kg/m  as calculated from the following:    Height as of this encounter: 1.716 m (5' 7.56\").    Weight as of this encounter: 122.9 kg (271 lb).   Weight management plan: Discussed healthy diet and exercise guidelines    Return in about 2 " "weeks (around 9/21/2021) for DM Follow Up.    Preeti Lin MD  [facility] DEXTER Wiggins is a 71 year old who presents for the following health issues  accompanied by his friend:    HPI     Here to Establish Care  Came down from [place] to [place] with friends family. Has been here before. Has DM2 and uses lantus 52 units nightly  and glipizide. BG in 130's. Maybe a low sugar here and there.  Has hypothyroidism, on 75 mcg of Synthroid. Schizoaffective disorder. Just increased his insulin. Hard to maintain a good diet. Good friend called his previous provider as noted to not be on metformin, per provider they never tried it. He is hard of hearing.     Review of Systems   Constitutional, HEENT, cardiovascular, pulmonary, GI, , musculoskeletal, neuro, skin, endocrine and psych systems are negative, except as otherwise noted.      Objective    /84   Pulse 75   Temp 97.6  F (36.4  C) (Oral)   Resp 16   Ht 1.716 m (5' 7.56\")   Wt 122.9 kg (271 lb)   SpO2 95%   BMI 41.75 kg/m    Body mass index is 41.75 kg/m .  Physical Exam   GENERAL: healthy, alert and no distress  EYES: Eyes grossly normal to inspection, PERRL and conjunctivae and sclerae normal  NECK: no adenopathy, no asymmetry, masses, or scars and thyroid normal to palpation  RESP: lungs clear to auscultation - no rales, rhonchi or wheezes  CV: regular rate and rhythm, normal S1 S2, no S3 or S4, no murmur, click or rub, no peripheral edema and peripheral pulses strong  ABDOMEN: soft, nontender, no hepatosplenomegaly, no masses and bowel sounds normal  MS: milder edema to knees, non pitting  SKIN: hyperpigmentation - lower legs, chronic venous stasis changes  PSYCH: mentation appears normal, affect normal/bright        "

## 2021-09-07 NOTE — PATIENT INSTRUCTIONS
Patient Education   Here is the plan from today's visit    1. Encounter to establish care  Nice to meet you!    2. Type 2 diabetes mellitus without complication, unspecified whether long term insulin use (H)  Follow up in 2 weeks   - metFORMIN (GLUCOPHAGE-XR) 500 MG 24 hr tablet; Take 1 tablet (500 mg) by mouth daily (with dinner) for 7 days, THEN 1 tablet (500 mg) 2 times daily (with meals) for 7 days.  Dispense: 21 tablet; Refill: 0  - Lantus 52 Units subcutaneous at night  - Hemoglobin A1c; Future    3. Congenital hypothyroidism without goiter  - TSH; Future    Please call or return to clinic if your symptoms don't go away.    Follow up plan  Return in about 2 weeks (around 9/21/2021) for DM Follow Up.    Thank you for coming to [facility] today.  COVID-19 Vaccine:  [facility] has walk-in appointments for COVID-19 vaccines. No appointment needed!   You also have the option of receiving Moderna vaccine during your physician appointment. Please ask your care team for more information!  Lab Testing:  **If you had lab testing today and your results are reassuring or normal they will be mailed to you or sent through ImageBrief within 7 days.   **If the lab tests need quick action we will call you with the results.  **If you are having labs done on a different day, please call [phone] to schedule at [facility] or [phone] for other [facility] locations.   The phone number we will call with results is # [phone] (home) none (work). If this is not the best number please call our clinic and change the number.  Medication Refills:  If you need any refills please call your pharmacy and they will contact us.   If you need to  your refill at a new pharmacy, please contact the new pharmacy directly. The new pharmacy will help you get your medications transferred faster.   Scheduling:  If you have any concerns about today's visit or wish to schedule another appointment please  call our office during normal business hours [phone] (8-5:00 M-F)  If a referral was made to a [facility] and you don't get a call from central scheduling please call [phone].  If a Mammogram was ordered for you at [facility] call [phone] to schedule or change your appointment.  If you had an EKG/XRay/CT/Ultrasound/MRI ordered the number is [phone] to schedule or change your radiology appointment.   Medical Concerns:  If you have urgent medical concerns please call [phone] at any time of the day.    Preeti Lin MD

## 2021-09-08 ENCOUNTER — TELEPHONE (OUTPATIENT)
Dept: FAMILY MEDICINE | Facility: CLINIC | Age: 71
End: 2021-09-08

## 2021-09-08 NOTE — TELEPHONE ENCOUNTER
RN spoke to patient and relayed message below from Dr. Lin- he verbalized understanding and was able to repeat back metformin instructions. States he will try and remember to take his synthroid before other medications and eating. Plan for follow up with Dr. Lin on 9/20/21 at 1320.     Patient requested assistance with scheduling [facility] transportation for follow up appointment- he is hard of hearing which makes it difficult for him to communicate over the phone sometimes. Routing to CC to assist- patient requests call back once ride is set up. Please attempt calling x2 as he sometimes doesn't hear his phone on first ring.   Ce Bunch Smiley's RN

## 2021-09-08 NOTE — TELEPHONE ENCOUNTER
3:15p.m Contacted [facility] at [phone] and scheduled patient transportation for his 9/20/21 @ 1:20p.m appointment with . [employer] will be at patient home to pick him up at about 12:40p.m. Patient will need to contact [employer] after his visit at [phone] to set up return ride. Contacted patient and gave him all information. Patient appreciative.    Adriana Wright  Care Coordinator

## 2021-09-11 PROBLEM — E11.9 TYPE 2 DIABETES MELLITUS WITHOUT COMPLICATION, UNSPECIFIED WHETHER LONG TERM INSULIN USE (H): Status: ACTIVE | Noted: 2021-09-11

## 2021-09-14 NOTE — PROGRESS NOTES
Preceptor Attestation:   Patient seen, evaluated and discussed with the resident. I have verified the content of the note, which accurately reflects my assessment of the patient and the plan of care.   Supervising Physician:  Mason Jay MD

## 2021-09-15 DIAGNOSIS — E03.9 HYPOTHYROIDISM, UNSPECIFIED TYPE: ICD-10-CM

## 2021-09-15 DIAGNOSIS — F20.0 PARANOID SCHIZOPHRENIA (H): ICD-10-CM

## 2021-09-15 DIAGNOSIS — Z74.1 REQUIRES ASSISTANCE WITH ACTIVITIES OF DAILY LIVING (ADL): ICD-10-CM

## 2021-09-15 DIAGNOSIS — E11.9 TYPE 2 DIABETES MELLITUS WITHOUT COMPLICATION, UNSPECIFIED WHETHER LONG TERM INSULIN USE (H): Primary | ICD-10-CM

## 2021-09-15 NOTE — Clinical Note
Can we make sure patient come back for Invega shot if needed this month or otherwise needs to come in next month. As waiting for him to get set up with home health again. Thank you!    Preeti Lin MD

## 2021-09-16 NOTE — PROGRESS NOTES
[facility]  [phone]  Referral auto sent, [facility] to review and will contact patient/family directly

## 2021-09-20 ENCOUNTER — OFFICE VISIT (OUTPATIENT)
Dept: FAMILY MEDICINE | Facility: CLINIC | Age: 71
End: 2021-09-20
Payer: COMMERCIAL

## 2021-09-20 VITALS
WEIGHT: 271 LBS | SYSTOLIC BLOOD PRESSURE: 120 MMHG | OXYGEN SATURATION: 95 % | DIASTOLIC BLOOD PRESSURE: 87 MMHG | TEMPERATURE: 98.1 F | BODY MASS INDEX: 41.07 KG/M2 | HEART RATE: 85 BPM | RESPIRATION RATE: 18 BRPM | HEIGHT: 68 IN

## 2021-09-20 DIAGNOSIS — E11.9 TYPE 2 DIABETES MELLITUS WITHOUT COMPLICATION, UNSPECIFIED WHETHER LONG TERM INSULIN USE (H): Primary | ICD-10-CM

## 2021-09-20 DIAGNOSIS — F20.0 PARANOID SCHIZOPHRENIA (H): ICD-10-CM

## 2021-09-20 DIAGNOSIS — Z23 NEED FOR PROPHYLACTIC VACCINATION AND INOCULATION AGAINST INFLUENZA: ICD-10-CM

## 2021-09-20 DIAGNOSIS — E03.9 HYPOTHYROIDISM, UNSPECIFIED TYPE: ICD-10-CM

## 2021-09-20 DIAGNOSIS — Z00.00 HEALTHCARE MAINTENANCE: ICD-10-CM

## 2021-09-20 LAB
ANION GAP SERPL CALCULATED.3IONS-SCNC: 4 MMOL/L (ref 3–14)
BUN SERPL-MCNC: 12 MG/DL (ref 7–30)
CALCIUM SERPL-MCNC: 9.1 MG/DL (ref 8.5–10.1)
CHLORIDE BLD-SCNC: 106 MMOL/L (ref 94–109)
CHOLEST SERPL-MCNC: 169 MG/DL
CO2 SERPL-SCNC: 27 MMOL/L (ref 20–32)
CREAT SERPL-MCNC: 1.07 MG/DL (ref 0.66–1.25)
CREAT UR-MCNC: 89 MG/DL
FASTING STATUS PATIENT QL REPORTED: NO
GFR SERPL CREATININE-BSD FRML MDRD: 70 ML/MIN/1.73M2
GLUCOSE BLD-MCNC: 101 MG/DL (ref 70–99)
HDLC SERPL-MCNC: 35 MG/DL
LDLC SERPL CALC-MCNC: 107 MG/DL
MICROALBUMIN UR-MCNC: 7 MG/L
MICROALBUMIN/CREAT UR: 7.87 MG/G CR (ref 0–17)
NONHDLC SERPL-MCNC: 134 MG/DL
POTASSIUM BLD-SCNC: 4.4 MMOL/L (ref 3.4–5.3)
SODIUM SERPL-SCNC: 137 MMOL/L (ref 133–144)
TRIGL SERPL-MCNC: 137 MG/DL

## 2021-09-20 PROCEDURE — 80048 BASIC METABOLIC PNL TOTAL CA: CPT | Performed by: STUDENT IN AN ORGANIZED HEALTH CARE EDUCATION/TRAINING PROGRAM

## 2021-09-20 PROCEDURE — 82043 UR ALBUMIN QUANTITATIVE: CPT | Performed by: STUDENT IN AN ORGANIZED HEALTH CARE EDUCATION/TRAINING PROGRAM

## 2021-09-20 PROCEDURE — 90471 IMMUNIZATION ADMIN: CPT | Performed by: STUDENT IN AN ORGANIZED HEALTH CARE EDUCATION/TRAINING PROGRAM

## 2021-09-20 PROCEDURE — 36415 COLL VENOUS BLD VENIPUNCTURE: CPT | Performed by: STUDENT IN AN ORGANIZED HEALTH CARE EDUCATION/TRAINING PROGRAM

## 2021-09-20 PROCEDURE — 99207 E-CONSULT TO BEHAVIORAL HEALTH (ADULT OUTPT PROVIDER TO SPECIALIST WRITTEN QUESTION & RESPONSE): CPT | Performed by: STUDENT IN AN ORGANIZED HEALTH CARE EDUCATION/TRAINING PROGRAM

## 2021-09-20 PROCEDURE — 99214 OFFICE O/P EST MOD 30 MIN: CPT | Mod: 25 | Performed by: STUDENT IN AN ORGANIZED HEALTH CARE EDUCATION/TRAINING PROGRAM

## 2021-09-20 PROCEDURE — 90662 IIV NO PRSV INCREASED AG IM: CPT | Performed by: STUDENT IN AN ORGANIZED HEALTH CARE EDUCATION/TRAINING PROGRAM

## 2021-09-20 PROCEDURE — 80061 LIPID PANEL: CPT | Performed by: STUDENT IN AN ORGANIZED HEALTH CARE EDUCATION/TRAINING PROGRAM

## 2021-09-20 RX ORDER — LISINOPRIL 2.5 MG/1
2.5 TABLET ORAL DAILY
Qty: 90 TABLET | Refills: 3 | Status: SHIPPED | OUTPATIENT
Start: 2021-09-20 | End: 2021-10-20

## 2021-09-20 RX ORDER — LEVOTHYROXINE SODIUM 75 UG/1
75 TABLET ORAL
Qty: 30 TABLET | Refills: 3 | Status: SHIPPED | OUTPATIENT
Start: 2021-09-20 | End: 2021-12-15

## 2021-09-20 RX ORDER — PALIPERIDONE PALMITATE 156 MG/ML
INJECTION INTRAMUSCULAR
COMMUNITY
Start: 2021-07-26

## 2021-09-20 RX ORDER — BLOOD SUGAR DIAGNOSTIC
STRIP MISCELLANEOUS
COMMUNITY
Start: 2021-08-30 | End: 2021-10-29

## 2021-09-20 RX ORDER — PEN NEEDLE, DIABETIC, SAFETY 30 GX3/16"
NEEDLE, DISPOSABLE MISCELLANEOUS
COMMUNITY
Start: 2021-07-06 | End: 2023-09-14

## 2021-09-20 RX ORDER — LISINOPRIL 2.5 MG/1
2.5 TABLET ORAL DAILY
COMMUNITY
Start: 2021-08-27 | End: 2021-09-20

## 2021-09-20 RX ORDER — LANCETS 21 GAUGE
EACH MISCELLANEOUS
COMMUNITY
Start: 2021-07-21 | End: 2021-12-14

## 2021-09-20 RX ORDER — METFORMIN HCL 500 MG
1000 TABLET, EXTENDED RELEASE 24 HR ORAL 2 TIMES DAILY WITH MEALS
Qty: 28 TABLET | Refills: 0 | Status: SHIPPED | OUTPATIENT
Start: 2021-09-20 | End: 2021-10-14

## 2021-09-20 ASSESSMENT — MIFFLIN-ST. JEOR: SCORE: 1961.75

## 2021-09-20 NOTE — PROGRESS NOTES
Assessment & Plan     Type 2 diabetes mellitus without complication, unspecified whether long term insulin use (H)  Due for basic labs in our system will check today. Diabetic foot exam unremarkable. Tolerating Metformin so will go on to Max dose and follow up in a month to see how he is doing.  - Lipid panel; Future  - Albumin Random Urine Quantitative with Creat Ratio; Future  - Basic metabolic panel; Future  - metFORMIN (GLUCOPHAGE-XR) 500 MG 24 hr tablet; Take 2 tablets (1,000 mg) by mouth 2 times daily (with meals) for 7 days  - lisinopril (ZESTRIL) 2.5 MG tablet; Take 1 tablet (2.5 mg) by mouth daily  - Lipid panel  - Basic metabolic panel  - Albumin Random Urine Quantitative with Creat Ratio      Paranoid schizophrenia (H)  Pt supposed to be on Invega shot monthly however we do not have it available in our clinic. I had sent out an urgent Psych referral, but he has not gotten an appointment yet. Will request an E-consult to see if they can order and get him set up as we are unable too. If they cannot help will have to send to [facility].   - E-CONSULT TO BEHAVIORAL HEALTH (ADULT OUTPT PROVIDER TO SPECIALIST WRITTEN QUESTION & RESPONSE)    Hypothyroidism, unspecified type  TSH slightly up but patient taking this medication with other meds. Discussed spacing it out from the others.   - levothyroxine (SYNTHROID/LEVOTHROID) 75 MCG tablet; Take 1 tablet (75 mcg) by mouth every morning (before breakfast)    Healthcare maintenance  - aspirin (ASPIRIN) 81 MG EC tablet; Take 1 tablet (81 mg) by mouth daily    Need for prophylactic vaccination and inoculation against influenza  - INFLUENZA, QUAD, HIGH DOSE, PF, 65YR + (FLUZONE HD)    Return in about 2 weeks (around 10/4/2021) for DM Follow Up and Mental Health.    Preeti Lin MD  [facility] DEXTER Wiggins is a 71 year old who presents for the following health issues     HPI     Invega shot  Last shot was 8/20 and would be due  "today for one. Sadly due not have in clinic. No hallucinations or delusions. No issue concentrating and thinking. Things are going well with friends family. Hasn't seen HH RN yet. Says psychiatrist is     Diabetes Mellitus Type 2   Sugar was 80 this AM and normally about 120. Not shakey confused or sweating. Hasn't been below 70. Staying around 100-175. Started metformin and no GI upset. No PPP.           Review of Systems   Constitutional, HEENT, cardiovascular, pulmonary, gi and gu systems are negative, except as otherwise noted.      Objective    /87   Pulse 85   Temp 98.1  F (36.7  C) (Oral)   Resp 18   Ht 1.716 m (5' 7.56\")   Wt 122.9 kg (271 lb)   SpO2 95%   BMI 41.75 kg/m    Body mass index is 41.75 kg/m .  Physical Exam   GENERAL: healthy, alert and no distress  CV: regular rate and rhythm, normal S1 S2, no S3 or S4, no murmur, click or rub, no peripheral edema and peripheral pulses strong  MS: no gross musculoskeletal defects noted, no edema  SKIN: no suspicious lesions or rashes  NEURO: Normal strength and tone, mentation intact and speech normal  PSYCH: mentation appears normal, affect flat and appearance well groomed  Diabetic foot exam: normal DP and PT pulses, no trophic changes or ulcerative lesions, normal sensory exam and normal monofilament exam    Office Visit on 09/07/2021   Component Date Value Ref Range Status     Hemoglobin A1C 09/07/2021 7.0* 0.0 - 5.6 % Final    Normal <5.7%   Prediabetes 5.7-6.4%    Diabetes 6.5% or higher     Note: Adopted from ADA consensus guidelines.     TSH 09/07/2021 4.89* 0.40 - 4.00 mU/L Final               "

## 2021-09-20 NOTE — PATIENT INSTRUCTIONS
Patient Education   Here is the plan from today's visit    1. Type 2 diabetes mellitus without complication, unspecified whether long term insulin use (H)  Follow up in 2 weeks to see how Metformin is going  If sugars are less than 70 decrease Lantus to 45 units   - Lipid panel; Future  - Albumin Random Urine Quantitative with Creat Ratio; Future  - Basic metabolic panel; Future  - metFORMIN (GLUCOPHAGE-XR) 500 MG 24 hr tablet; Take 2 tablets (1,000 mg) by mouth 2 times daily (with meals) for 7 days  Dispense: 28 tablet; Refill: 0  - lisinopril (ZESTRIL) 2.5 MG tablet; Take 1 tablet (2.5 mg) by mouth daily  Dispense: 90 tablet; Refill: 3    2. Paranoid schizophrenia (H)  Will call to tell you where to go for your shot  If not able to soon go to [facility] [address]  - E-CONSULT TO BEHAVIORAL HEALTH (ADULT OUTPT PROVIDER TO SPECIALIST WRITTEN QUESTION & RESPONSE)    6. Hypothyroidism, unspecified type  - levothyroxine (SYNTHROID/LEVOTHROID) 75 MCG tablet; Take 1 tablet (75 mcg) by mouth every morning (before breakfast)  Dispense: 30 tablet; Refill: 3    6. Healthcare maintenance  - aspirin (ASPIRIN) 81 MG EC tablet; Take 1 tablet (81 mg) by mouth daily  Dispense: 90 tablet; Refill: 6      Please call or return to clinic if your symptoms don't go away.    Follow up plan  Return in about 2 weeks (around 10/4/2021) for DM Follow Up and Mental Health.    Thank you for coming to [facility] today.  COVID-19 Vaccine:  [facility] has walk-in appointments for COVID-19 vaccines. No appointment needed!   You also have the option of receiving Moderna vaccine during your physician appointment. Please ask your care team for more information!  Lab Testing:  **If you had lab testing today and your results are reassuring or normal they will be mailed to you or sent through Brightstorm within 7 days.   **If the lab tests need quick action we will call you with the results.  **If you are having labs  done on a different day, please call [phone] to schedule at [facility] or [phone] for other [facility] locations.   The phone number we will call with results is # [phone] (home) none (work). If this is not the best number please call our clinic and change the number.  Medication Refills:  If you need any refills please call your pharmacy and they will contact us.   If you need to  your refill at a new pharmacy, please contact the new pharmacy directly. The new pharmacy will help you get your medications transferred faster.   Scheduling:  If you have any concerns about today's visit or wish to schedule another appointment please call our office during normal business hours [phone] (8-5:00 M-F)  If a referral was made to a [facility] and you don't get a call from central scheduling please call [phone].  If a Mammogram was ordered for you at [facility] call [phone] to schedule or change your appointment.  If you had an EKG/XRay/CT/Ultrasound/MRI ordered the number is [phone] to schedule or change your radiology appointment.   Medical Concerns:  If you have urgent medical concerns please call [phone] at any time of the day.    Preeti Lin MD

## 2021-09-20 NOTE — LETTER
September 22, 2021      Feliciano Ross  [address] [place] [place] [facility] [address]        Dear Feliciano,    Thank you for getting your care at [facility]. Please see below for your test results.    Resulted Orders   Lipid panel   Result Value Ref Range    Cholesterol 169 <200 mg/dL    Triglycerides 137 <150 mg/dL    Direct Measure HDL 35 (L) >=40 mg/dL    LDL Cholesterol Calculated 107 (H) <=100 mg/dL    Non HDL Cholesterol 134 (H) <130 mg/dL    Patient Fasting > 8hrs? No     Narrative    Cholesterol  Desirable:  <200 mg/dL    Triglycerides  Normal:  Less than 150 mg/dL  Borderline High:  150-199 mg/dL  High:  200-499 mg/dL  Very High:  Greater than or equal to 500 mg/dL    Direct Measure HDL  Female:  Greater than or equal to 50 mg/dL   Male:  Greater than or equal to 40 mg/dL    LDL Cholesterol  Desirable:  <100mg/dL  Above Desirable:  100-129 mg/dL   Borderline High:  130-159 mg/dL   High:  160-189 mg/dL   Very High:  >= 190 mg/dL    Non HDL Cholesterol  Desirable:  130 mg/dL  Above Desirable:  130-159 mg/dL  Borderline High:  160-189 mg/dL  High:  190-219 mg/dL  Very High:  Greater than or equal to 220 mg/dL   Basic metabolic panel   Result Value Ref Range    Sodium 137 133 - 144 mmol/L    Potassium 4.4 3.4 - 5.3 mmol/L    Chloride 106 94 - 109 mmol/L    Carbon Dioxide (CO2) 27 20 - 32 mmol/L    Anion Gap 4 3 - 14 mmol/L    Urea Nitrogen 12 7 - 30 mg/dL    Creatinine 1.07 0.66 - 1.25 mg/dL    Calcium 9.1 8.5 - 10.1 mg/dL    Glucose 101 (H) 70 - 99 mg/dL    GFR Estimate 70 >60 mL/min/1.73m2      Comment:      As of July 11, 2021, eGFR is calculated by the CKD-EPI creatinine equation, without race adjustment. eGFR can be influenced by muscle mass, exercise, and diet. The reported eGFR is an estimation only and is only applicable if the renal function is stable.   Albumin Random Urine Quantitative with Creat Ratio   Result Value Ref Range    Creatinine Urine mg/dL 89 mg/dL    Albumin Urine mg/L 7 mg/L    Albumin Urine mg/g Cr  7.87 0.00 - 17.00 mg/g Cr       If you have any concerns about these results please call and leave a message for me or send a [employer] message to the clinic.    Sincerely,    Preeti Lin MD

## 2021-09-21 ENCOUNTER — E-CONSULT (OUTPATIENT)
Dept: BEHAVIORAL HEALTH | Facility: CLINIC | Age: 71
End: 2021-09-21
Payer: COMMERCIAL

## 2021-09-21 PROCEDURE — 99207 PR NO CHARGE LOS: CPT | Performed by: PSYCHIATRY & NEUROLOGY

## 2021-09-21 NOTE — PROGRESS NOTES
ALL SMARTFIELDS MUST BE COMPLETED FOR PATIENT CARE AND BILLING    9/21/2021     E-Consult has been denied due to: Doesn't meet criteria for E-Consult.    Interprofessional consultation requested by:  Breonna Salvador DO      Clinical Question/Purpose: MY CLINICAL QUESTION IS: Pt with history of schizoaffective disorder needing monthly Invega shots; recently moved to [facility] from [facility]. Due for shot today (9/20/21) has not been contacted by psychiatry yet from regular consult. Where and how can we get him set up for a shots?     Patient assessment and information reviewed:     Recommendations: If the referral was placed, (or even it it was not), call [phone] which is the Behavioral Access line.  If they are unable to get an appointment in our system (it is hard to do as we do not have many panel psychiatrists), then they can get the person set up with a community provider.  Many primary clinics do long acting antipsychotic shots.  If this person is stable, not sure then need consult unless your clinic is not set up for injectables.         The recommendations provided in this E-Consult are based on the clinical data available to me at this time, and are furnished without the benefit of a comprehensive in-person or virtual patient evaluation, Any new clinical issues or changes in patient status since the filing of this E-Consult will need to be taken into account when assessing these recommendations. Please contact me if you have further questions.    My total time spent reviewing clinical information and formulating assessment was 5 minutes.    Report sent automatically to requesting provider once signed.     Charge codes - 5238121 (5+ minutes) or 24L8596 (No charge code)    Stephan Villalobos MD

## 2021-09-24 ENCOUNTER — MEDICAL CORRESPONDENCE (OUTPATIENT)
Dept: HEALTH INFORMATION MANAGEMENT | Facility: CLINIC | Age: 71
End: 2021-09-24
Payer: COMMERCIAL

## 2021-09-28 NOTE — PROGRESS NOTES
Preceptor Attestation:   Patient seen, evaluated and discussed with the resident. I have verified the content of the note, which accurately reflects my assessment of the patient and the plan of care.   Supervising Physician:  Breonna Salvador, DO

## 2021-10-01 ENCOUNTER — TELEPHONE (OUTPATIENT)
Dept: PSYCHIATRY | Facility: CLINIC | Age: 71
End: 2021-10-01

## 2021-10-01 NOTE — TELEPHONE ENCOUNTER
[facility]      Patient Name:  Feliciano Ross  /Age:  1950 (71 year old)      Intervention: Patient called regarding the letter he had received to schedule an appointment with this clinic per a referral from Tonia Potts MD. Patient stated he was referred for Invega shots with this clinic but said he has found someone else and is receiving the care he needs.    Status of Referral: Removed from work queue.    Plan: No further action needed at this time.      Carlota Lay,     [facility]

## 2021-10-11 DIAGNOSIS — E11.9 TYPE 2 DIABETES MELLITUS WITHOUT COMPLICATION, UNSPECIFIED WHETHER LONG TERM INSULIN USE (H): ICD-10-CM

## 2021-10-14 ENCOUNTER — TELEPHONE (OUTPATIENT)
Dept: FAMILY MEDICINE | Facility: CLINIC | Age: 71
End: 2021-10-14

## 2021-10-14 DIAGNOSIS — E03.9 HYPOTHYROIDISM, UNSPECIFIED TYPE: ICD-10-CM

## 2021-10-14 DIAGNOSIS — F20.0 PARANOID SCHIZOPHRENIA (H): Primary | ICD-10-CM

## 2021-10-14 DIAGNOSIS — E11.9 TYPE 2 DIABETES MELLITUS WITHOUT COMPLICATION, UNSPECIFIED WHETHER LONG TERM INSULIN USE (H): ICD-10-CM

## 2021-10-14 RX ORDER — METFORMIN HCL 500 MG
TABLET, EXTENDED RELEASE 24 HR ORAL
Qty: 28 TABLET | Refills: 0 | Status: SHIPPED | OUTPATIENT
Start: 2021-10-14 | End: 2021-10-20

## 2021-10-14 NOTE — TELEPHONE ENCOUNTER
[facility] phone call message - order or referral request for patient:     Order or referral being requested: [facility] is requesting the Referral that was sent on 09/15/2021 be sent back to [facility] after adding the need for Monthly injections.       Additional Comments: [facility]  [phone]    OK to leave a message on voice mail? Yes    Primary language: English      needed? No    Call taken on October 14, 2021 at 11:37 AM by Stephany Cox CMA

## 2021-10-14 NOTE — TELEPHONE ENCOUNTER
"RN spoke to Raven who is patient's [facility] Care Coordinator- reports she is trying to get patient set up with skilled nursing visits. Reports she contacted [facility] and they said a referral was sent to them on 9/15/21 but they told her they didn't see a \"skilled need and were also booked out\". Whomever she spoke with at [facility] recommended having provider resubmit the referral now.     Suize requesting that new HC Referral be sent to [facility] and include that he needs monthly IM Invega injections for mental health and assistance with diabetes medications specifically his insulin. She is currently working on getting him set up with [facility] as well. Routing to PCP to place referral if approrpaite.   Ce Peralta RN     "

## 2021-10-19 NOTE — TELEPHONE ENCOUNTER
10/19/21 Order faxed to [facility] [phone], per request.    Adriana Wright  Care Coordinator

## 2021-10-19 NOTE — TELEPHONE ENCOUNTER
[facility] called to inform the clinic that they are at capacity and can not take on this client and our clinic will need to find another place for the orders to be sent to.    Stephany Cox, CMA

## 2021-10-20 ENCOUNTER — OFFICE VISIT (OUTPATIENT)
Dept: FAMILY MEDICINE | Facility: CLINIC | Age: 71
End: 2021-10-20
Payer: COMMERCIAL

## 2021-10-20 VITALS
BODY MASS INDEX: 41.28 KG/M2 | HEART RATE: 89 BPM | RESPIRATION RATE: 16 BRPM | SYSTOLIC BLOOD PRESSURE: 123 MMHG | WEIGHT: 268 LBS | OXYGEN SATURATION: 95 % | TEMPERATURE: 98.4 F | DIASTOLIC BLOOD PRESSURE: 90 MMHG

## 2021-10-20 DIAGNOSIS — F20.0 PARANOID SCHIZOPHRENIA (H): Primary | ICD-10-CM

## 2021-10-20 DIAGNOSIS — E11.9 TYPE 2 DIABETES MELLITUS WITHOUT COMPLICATION, UNSPECIFIED WHETHER LONG TERM INSULIN USE (H): ICD-10-CM

## 2021-10-20 PROCEDURE — 99214 OFFICE O/P EST MOD 30 MIN: CPT | Mod: 25 | Performed by: STUDENT IN AN ORGANIZED HEALTH CARE EDUCATION/TRAINING PROGRAM

## 2021-10-20 PROCEDURE — 96372 THER/PROPH/DIAG INJ SC/IM: CPT | Performed by: FAMILY MEDICINE

## 2021-10-20 RX ORDER — METFORMIN HCL 500 MG
1000 TABLET, EXTENDED RELEASE 24 HR ORAL 2 TIMES DAILY WITH MEALS
Qty: 180 TABLET | Refills: 3 | Status: SHIPPED | OUTPATIENT
Start: 2021-10-20 | End: 2022-05-03

## 2021-10-20 RX ORDER — LISINOPRIL 2.5 MG/1
2.5 TABLET ORAL DAILY
Qty: 90 TABLET | Refills: 3 | Status: SHIPPED | OUTPATIENT
Start: 2021-10-20 | End: 2022-03-24

## 2021-10-20 NOTE — PATIENT INSTRUCTIONS
Patient Education   Here is the plan from today's visit    1. Paranoid schizophrenia (H)  Follow up in 4 weeks; if no home nurse yet bring medication to visit.     2. Type 2 diabetes mellitus without complication, unspecified whether long term insulin use (H)  Check sugar in the morning and once a night  Bring meter at next visit   - lisinopril (ZESTRIL) 2.5 MG tablet; Take 1 tablet (2.5 mg) by mouth daily  Dispense: 90 tablet; Refill: 3  - metFORMIN (GLUCOPHAGE-XR) 500 MG 24 hr tablet; Take 2 tablets (1,000 mg) by mouth 2 times daily (with meals)  Dispense: 180 tablet; Refill: 3      Please call or return to clinic if your symptoms don't go away.    Follow up plan  Return in about 4 weeks (around 11/17/2021) for DM Follow Up and mental health.    Thank you for coming to [facility] today.  COVID-19 Vaccine:  [facility] has walk-in appointments for COVID-19 vaccines. No appointment needed!   You also have the option of receiving Pfizer vaccine during your physician appointment. Please ask your care team for more information!  Lab Testing:  **If you had lab testing today and your results are reassuring or normal they will be mailed to you or sent through [facility] within 7 days.   **If the lab tests need quick action we will call you with the results.  **If you are having labs done on a different day, please call [phone] to schedule at [facility] or [phone] for other [facility] locations.   The phone number we will call with results is # [phone] (home) none (work). If this is not the best number please call our clinic and change the number.  Medication Refills:  If you need any refills please call your pharmacy and they will contact us.   If you need to  your refill at a new pharmacy, please contact the new pharmacy directly. The new pharmacy will help you get your medications transferred faster.   Scheduling:  If you have any concerns about today's visit or wish  to schedule another appointment please call our office during normal business hours [phone] (8-5:00 M-F)  If a referral was made to a [facility] and you don't get a call from central scheduling please call [phone].  If a Mammogram was ordered for you at [facility] call [phone] to schedule or change your appointment.  If you had an EKG/XRay/CT/Ultrasound/MRI ordered the number is [phone] to schedule or change your radiology appointment.   Medical Concerns:  If you have urgent medical concerns please call [phone] at any time of the day.    Preeti Lin MD

## 2021-10-20 NOTE — PROGRESS NOTES
Clinic Administered Medication Documentation    Administrations This Visit     paliperidone (INVEGA SUSTENNA) injection IVANNA 156 mg     Admin Date  10/20/2021 Action  Given Dose  156 mg Route  Intramuscular Site   Administered By  Preeti Lin MD    Ordering Provider: Mason Jay MD    Patient Supplied?: Yes    Comments: Pt was due for medication                  Injectable Medication Documentation    Patient was given Invega Sustenna. Prior to medication administration, verified patients identity using patient s name and date of birth. Please see MAR and medication order for additional information. Patient instructed to remain in clinic for 15 minutes.      Was entire vial of medication used? Yes  Vial/Syringe: Syringe  Expiration Date:  02/28/2023  Was this medication supplied by the patient? Yes, Medication was received directly from patient (follow site specific policies)    Name of provider who requested the medication administration: Preeti Lin MD  Name of provider on site (faculty or community preceptor) at the time of performing the medication administration: Mason Jay MD    Date of next administration: 11/20/2021  Date of next office visit with provider to renew medication plan (must be seen annually): 9/20/2022    Preeti Lin MD

## 2021-10-20 NOTE — PROGRESS NOTES
Assessment & Plan     Paranoid schizophrenia (H)  Pt brought in his invega shot today, as this is normally not a medication our staff give, but was needed I gave him his medication. Pt to be set up with home health with monthly visit in order to receive help with his medication and administration of it. Reordered Home Health Referral as [facility] was full. Follow up in a month to see if Home health set up and medication received.   - paliperidone (INVEGA SUSTENNA) injection IVANNA 156 mg    Type 2 diabetes mellitus without complication, unspecified whether long term insulin use (H)  Refilled medication for diabetes. Pt had some diarrhea with increase in metformin dose, but it has calmed down. Will continue metformin at 1000 mg BID and repeat A1C in 2 months, if stable or improving can likely decrease insulin dose.  - lisinopril (ZESTRIL) 2.5 MG tablet; Take 1 tablet (2.5 mg) by mouth daily  - metFORMIN (GLUCOPHAGE-XR) 500 MG 24 hr tablet; Take 2 tablets (1,000 mg) by mouth 2 times daily (with meals)                 Return in about 4 weeks (around 11/17/2021) for DM Follow Up and mental health.    Preeti Lin MD  [facility] DEXTER Wiggins is a 71 year old who presents for the following health issues     HPI     Schizophrenia follow up  Pt  was able to be gotten a hold of in order to get Invega shots set up with home health and will have patient reestablish care with his previous psychiatrist. Pt was not able to get Invega shot at [facility]. Pt brought in the medication and it was given. Per patient his roommate gave him is one last month, but he isn't sure if his roommate did it right and he himself does not know how to give it to himself. He normally has a nurse come by to help him.     Type 2 DM follow up  Pt is somewhat tolerating max dose of Metformin. Sugars have been 90's to 175's in the morning. Had some diarrhea with increased dose, but has calmed down. Still  using Lantus 57 units at not. No sxs of hyper or hypoglycemia.           Review of Systems   Constitutional, HEENT, cardiovascular, pulmonary, gi and gu systems are negative, except as otherwise noted.      Objective    BP (!) 123/90   Pulse 89   Temp 98.4  F (36.9  C) (Oral)   Resp 16   Wt 121.6 kg (268 lb)   SpO2 95%   BMI 41.28 kg/m    Body mass index is 41.28 kg/m .  Physical Exam   GENERAL: healthy, alert and no distress  EYES: Eyes grossly normal to inspection, PERRL and conjunctivae and sclerae normal  NECK: no adenopathy, no asymmetry, masses, or scars and thyroid normal to palpation  CV: regular rate and rhythm, normal S1 S2, no S3 or S4, no murmur, click or rub, no peripheral edema and peripheral pulses strong  MS: no gross musculoskeletal defects noted, no edema  SKIN: no suspicious lesions or rashes  PSYCH: mentation appears normal, affect flat, judgement and insight intact and appearance well groomed

## 2021-10-21 NOTE — TELEPHONE ENCOUNTER
10/21/21 Attempted to reach [facility]  Raven at [phone] to inform her that referral was faxed to [facility] on 10/19/21 Left message along with my direct number for call back if needed..    Adriana Wright  Care Coordinator

## 2021-10-25 ENCOUNTER — TELEPHONE (OUTPATIENT)
Dept: FAMILY MEDICINE | Facility: CLINIC | Age: 71
End: 2021-10-25

## 2021-10-25 DIAGNOSIS — E11.9 TYPE 2 DIABETES MELLITUS WITHOUT COMPLICATION, UNSPECIFIED WHETHER LONG TERM INSULIN USE (H): Primary | ICD-10-CM

## 2021-10-25 NOTE — TELEPHONE ENCOUNTER
Verify that the refill encounter hasn't been started Yes    [facility] phone call message- patient requesting a refill:    Full Medication Name: insulin glargine (LANTUS PEN) 100 UNIT/ML pen      Dose: Inject 52 Units Subcutaneous At Bedtime - Subcutaneous     Pharmacy confirmed as   [facility] - [place], [place] - [address]  [address] [facility] [address]  Phone: [phone] Fax: [phone]  : Yes    Medication tab checked to see if medication has been sent  Yes    Additional Comments: Patient stated they are out of insulin needles. Author inquired if patient is looking for insulin pen but patient kept saying needles. Please advise.     OK to leave a message on voice mail? Yes    Advised patient refill may take up to 2 business days? Yes    Primary language: English      needed? No    Call taken on October 25, 2021 at 3:19 PM by Roxanna Lopez    Route to P [facility] MED REFILL

## 2021-10-25 NOTE — TELEPHONE ENCOUNTER
RN refilling supplies per diabetes protocol. Last office visit 10/20/21, last A1C 7.0. Has follow up appointment on 11/15.     Natanael Vera RN

## 2021-10-26 ENCOUNTER — TELEPHONE (OUTPATIENT)
Dept: FAMILY MEDICINE | Facility: CLINIC | Age: 71
End: 2021-10-26

## 2021-10-26 NOTE — TELEPHONE ENCOUNTER
2:12p.m Attempted to reach Odette back, she was unavailable, I spoke to Charles and let her know that per chart patient has not been to another Home Care Facility. [facility] could not take new clients due to staffing.    Adriana Wright  Care Coordinator

## 2021-10-26 NOTE — TELEPHONE ENCOUNTER
[facility] phone call message- general phone call:    Reason for call: Odette called regarding the referral for home care that was faxed this am.     Action Desired: Odette requesting a call from Adriana. Wondering if patient had any previous home care.    Return call needed: Yes  [phone]    OK to leave a message on voice mail? Yes    Advised patient response may take up to 2 business days: Yes    Primary language: English      needed? No    Call taken on October 26, 2021 at 11:26 AM by Roberta Su to [facility] TRIAGE

## 2021-10-26 NOTE — PROGRESS NOTES
Preceptor Attestation:   Patient seen, evaluated and discussed with the resident. I have verified the content of the note, which accurately reflects my assessment of the patient and the plan of care.  Was present for the injection with Dr. Lin and supervised.     Supervising Physician:  Mason Jay MD

## 2021-10-27 ENCOUNTER — TELEPHONE (OUTPATIENT)
Dept: FAMILY MEDICINE | Facility: CLINIC | Age: 71
End: 2021-10-27

## 2021-10-27 NOTE — TELEPHONE ENCOUNTER
"Eliseo from [facility] called to inform us  that the \"face to face date states 10/18/21, however the encounter date is 10/20/21\". \"Dates have to be the same\". Forwarding message to .    Adriana Wright  Care Coordinator    "

## 2021-10-28 ENCOUNTER — TELEPHONE (OUTPATIENT)
Dept: FAMILY MEDICINE | Facility: CLINIC | Age: 71
End: 2021-10-28
Payer: COMMERCIAL

## 2021-10-28 ENCOUNTER — TELEPHONE (OUTPATIENT)
Dept: FAMILY MEDICINE | Facility: CLINIC | Age: 71
End: 2021-10-28

## 2021-10-28 NOTE — TELEPHONE ENCOUNTER
[facility] phone call message- medication clarification/question:    Full Medication Name: All Medications    Question/Clarification needed: Multiple Questions for Home Health Nurse. Medication List needs updating.    Currently the patient only has Aspirin, Metformin, Lisinopril, Invega and Levothyroxine. Is this the only medication the patient is currently using?    Patient is taking INVEGA and it also states that there is injectable abilify, Does the patient continue to take the Abilify?         Pharmacy confirmed as   [facility] - [place], [place] - [address] [place] [address]  [address] [place] [address]  Phone: [phone] Fax: [phone]    [facility] - [place], [place] - [address] [facility]  [address] [facility] [address]  Phone: [phone] Fax: [phone]  : Yes    Please leave ONLY preferred pharmacy    OK to leave a message on voice mail? Yes    Advised patient that RN would call back within 3 hours, unless emergent.    Primary language: English      needed? No    Call taken on October 28, 2021 at 4:57 PM by Stephany Cox CMA    Route to P [facility] TRIAGE

## 2021-10-28 NOTE — TELEPHONE ENCOUNTER
Returned call to home care nurse to give verbal orders per protocol as requested. Nurse verbalized understanding.    RN requesting contact information for mental health medication prescribers and would like an updated and reconciled medication list. Reports that patient is currently taking daily aspirin, levothyroxine, lisinopril, metformin, but needs clear instructions for what medications patient is taking. Would appreciate updated med list faxed to [phone]. Requested information for who is managing patient's psych medications (unsure if patient is still taking monthly abilify injections or just Invega). RN unsure, will route to PCP and CC to advise (see separate encounter from 10/28).     Natanael Vera RN

## 2021-10-28 NOTE — TELEPHONE ENCOUNTER
[facility] phone call message - order or referral request for patient:     Order or referral being requested: Skilled Nursing 1 time a month for 2 months and 1 prn      Additional Comments: na    OK to leave a message on voice mail? Yes    Primary language: English      needed? No    Call taken on October 28, 2021 at 4:55 PM by Stephany Cox CMA

## 2021-10-29 ENCOUNTER — TELEPHONE (OUTPATIENT)
Dept: FAMILY MEDICINE | Facility: CLINIC | Age: 71
End: 2021-10-29

## 2021-10-29 DIAGNOSIS — E11.9 TYPE 2 DIABETES MELLITUS WITHOUT COMPLICATION, UNSPECIFIED WHETHER LONG TERM INSULIN USE (H): Primary | ICD-10-CM

## 2021-10-29 RX ORDER — BLOOD SUGAR DIAGNOSTIC
STRIP MISCELLANEOUS
Qty: 100 STRIP | Refills: 11 | Status: SHIPPED | OUTPATIENT
Start: 2021-10-29 | End: 2023-06-27

## 2021-10-29 NOTE — TELEPHONE ENCOUNTER
RN faxed updated copy of medication list to [facility] at [phone] as requested. Received fax confirmation [phone].   Ce Peralta RN

## 2021-10-29 NOTE — TELEPHONE ENCOUNTER
10/29/21 Faxed updated clinic notes/F2F, order to [facility] [phone]. Successful send.     Adriana Wright  Care Coordinator

## 2021-10-29 NOTE — TELEPHONE ENCOUNTER
10/29/21 Faxed updated clinic notes/F2F, order to [facility] [phone]. Successful send.    Adriana Wright  Care Coordinator

## 2021-10-29 NOTE — TELEPHONE ENCOUNTER
Pt is getting reestablished with Dr. Treviño's (psychiatrist) who should be managing psych medications. Until then I am willing to bridge medications.     Preeti Lin MD

## 2021-10-29 NOTE — TELEPHONE ENCOUNTER
[facility] phone call message- patient requesting a refill:    Full Medication Name: blood glucose monitoring (ONE TOUCH ULTRA 2) meter device kit    Dose: See chart     Pharmacy confirmed as     [facility] - [place], [place] - [address]  [address] [facility] [address]  Phone: [phone] Fax: [phone]  : Yes    Additional Comments: Patient is all out of test strips and needs a refill.      OK to leave a message on voice mail? Yes    Primary language: English      needed? No    Call taken on October 29, 2021 at 2:40 PM by Pili Norman

## 2021-11-11 ENCOUNTER — TELEPHONE (OUTPATIENT)
Dept: FAMILY MEDICINE | Facility: CLINIC | Age: 71
End: 2021-11-11
Payer: COMMERCIAL

## 2021-11-11 DIAGNOSIS — F19.11 HISTORY OF DRUG ABUSE (H): ICD-10-CM

## 2021-11-11 DIAGNOSIS — F20.0 PARANOID SCHIZOPHRENIA (H): ICD-10-CM

## 2021-11-11 DIAGNOSIS — E11.9 TYPE 2 DIABETES MELLITUS WITHOUT COMPLICATION, UNSPECIFIED WHETHER LONG TERM INSULIN USE (H): Primary | ICD-10-CM

## 2021-11-11 NOTE — TELEPHONE ENCOUNTER
Patient's HC RN verifying medications for patient. Inquiring if patient should be taking glipizide, Seroquel, and trazodone. RN unsure as these are patient reported medications. RN routing to PCP to clarify and refill if appropriate.     Natanael Vera RN

## 2021-11-11 NOTE — TELEPHONE ENCOUNTER
[facility] phone call message- medication clarification/question:    Full Medication Name: glipiZIDE (GLUCOTROL XL) 5 MG 24 hr tablet,QUEtiapine (SEROQUEL XR) 50 MG TB24,  traZODone (DESYREL) 50 MG tablet    Question/Clarification needed: Rosa from Home care called wanting to know if the patient was suppose to be taking Glipizide, seroquil and Trazadone. Glipizide is missing the frequency in the medication list and Trazadone was missing the dosage. Please call the Home Care agency and clarify the frequency and dosage and can you please send these medications to the pharmacy outlined below    Pharmacy confirmed as :   [facility] - [place], [place] - [address] [facility]  [address] [facility] [address]  Phone: [phone] Fax: [phone]    Please leave ONLY preferred pharmacy    OK to leave a message on voice mail? No    Advised patient that RN would call back within 3 hours, unless emergent.    Primary language: English      needed? No    Call taken on November 11, 2021 at 4:47 PM by Stephany Cox CMA    Route to P [facility] TRIAGE

## 2021-11-12 RX ORDER — TRAZODONE HYDROCHLORIDE 50 MG/1
50 TABLET, FILM COATED ORAL
Qty: 90 TABLET | Refills: 3 | Status: SHIPPED | OUTPATIENT
Start: 2021-11-12 | End: 2023-09-14

## 2021-11-12 RX ORDER — QUETIAPINE FUMARATE 50 MG/1
100 TABLET, EXTENDED RELEASE ORAL AT BEDTIME
Qty: 120 TABLET | Refills: 3 | Status: SHIPPED | OUTPATIENT
Start: 2021-11-12 | End: 2021-11-15

## 2021-11-12 RX ORDER — GLIPIZIDE 5 MG/1
5 TABLET, FILM COATED, EXTENDED RELEASE ORAL DAILY
Qty: 90 TABLET | Refills: 3 | Status: SHIPPED | OUTPATIENT
Start: 2021-11-12 | End: 2022-03-24

## 2021-11-12 NOTE — TELEPHONE ENCOUNTER
RN spoke with HC RN to advise that provider had updated med list and sent prescriptions to pharmacy. RN confirmed fax number to send updated med list as [phone]. Received successful fax at [phone].    Natanael Vera RN

## 2021-11-12 NOTE — TELEPHONE ENCOUNTER
Yes, patient are taking those listed medications. Refill ordered with how many and when to take them for better clarification.     Preeti Lin MD

## 2021-11-15 ENCOUNTER — OFFICE VISIT (OUTPATIENT)
Dept: FAMILY MEDICINE | Facility: CLINIC | Age: 71
End: 2021-11-15
Payer: COMMERCIAL

## 2021-11-15 VITALS
HEART RATE: 86 BPM | WEIGHT: 260.6 LBS | DIASTOLIC BLOOD PRESSURE: 80 MMHG | RESPIRATION RATE: 16 BRPM | BODY MASS INDEX: 39.5 KG/M2 | SYSTOLIC BLOOD PRESSURE: 106 MMHG | TEMPERATURE: 98.1 F | HEIGHT: 68 IN | OXYGEN SATURATION: 97 %

## 2021-11-15 DIAGNOSIS — Z23 HIGH PRIORITY FOR 2019-NCOV VACCINE: ICD-10-CM

## 2021-11-15 DIAGNOSIS — E11.9 TYPE 2 DIABETES MELLITUS WITHOUT COMPLICATION, UNSPECIFIED WHETHER LONG TERM INSULIN USE (H): ICD-10-CM

## 2021-11-15 DIAGNOSIS — E66.01 MORBID OBESITY (H): ICD-10-CM

## 2021-11-15 DIAGNOSIS — F20.0 PARANOID SCHIZOPHRENIA (H): Primary | ICD-10-CM

## 2021-11-15 PROCEDURE — 91306 COVID-19,PF,MODERNA (18+ YRS BOOSTER .25ML): CPT | Performed by: STUDENT IN AN ORGANIZED HEALTH CARE EDUCATION/TRAINING PROGRAM

## 2021-11-15 PROCEDURE — 0064A COVID-19,PF,MODERNA (18+ YRS BOOSTER .25ML): CPT | Performed by: STUDENT IN AN ORGANIZED HEALTH CARE EDUCATION/TRAINING PROGRAM

## 2021-11-15 PROCEDURE — 99213 OFFICE O/P EST LOW 20 MIN: CPT | Mod: GC | Performed by: STUDENT IN AN ORGANIZED HEALTH CARE EDUCATION/TRAINING PROGRAM

## 2021-11-15 ASSESSMENT — MIFFLIN-ST. JEOR: SCORE: 1914.57

## 2021-11-15 NOTE — PATIENT INSTRUCTIONS
Patient Education   Here is the plan from today's visit    1. Paranoid schizophrenia (H)  Getting medication from home health    2. Morbid obesity (H)  3. Type 2 diabetes mellitus without complication, unspecified whether long term insulin use (H)  - Bring medications next time  - Continue to work out and try to avoid sugars    Please call or return to clinic if your symptoms don't go away.    Follow up plan  Return in about 4 weeks (around 12/13/2021) for DM Follow Up.    Thank you for coming to [facility] today.  Lab Testing:  **If you had lab testing today and your results are reassuring or normal they will be mailed to you or sent through [facility] within 7 days.   **If the lab tests need quick action we will call you with the results.  **If you are having labs done on a different day, please call [phone] to schedule at [facility] or [phone] for other [facility] locations. Labs do not offer walk-in appointments.  The phone number we will call with results is # [phone] (home) none (work). If this is not the best number please call our clinic and change the number.  Medication Refills:  If you need any refills please call your pharmacy and they will contact us.   If you need to  your refill at a new pharmacy, please contact the new pharmacy directly. The new pharmacy will help you get your medications transferred faster.   Scheduling:  If you have any concerns about today's visit or wish to schedule another appointment please call our office during normal business hours [phone] (8-5:00 M-F)  If a referral was made to a [facility] and you don't get a call from central scheduling please call [phone].  If a Mammogram was ordered for you at [facility] call [phone] to schedule or change your appointment.  If you had an EKG/XRay/CT/Ultrasound/MRI ordered the number is [phone] to schedule or change your radiology appointment.    [facility] has limited ultrasound appointments available on Wednesdays, if you would like your ultrasound at [facility], please call [phone] to schedule.   Medical Concerns:  If you have urgent medical concerns please call [phone] at any time of the day.    Preeti Lin MD

## 2021-11-15 NOTE — PROGRESS NOTES
Preceptor Attestation:   Patient seen, evaluated and discussed with the resident. I have verified the content of the note, which accurately reflects my assessment of the patient and the plan of care.   Supervising Physician:  Zachary Calyton MD

## 2021-11-15 NOTE — PROGRESS NOTES
"  Assessment & Plan     Paranoid schizophrenia (H)  Pt has confirmed has home health and receiving help with his medications and getting Invega shot. Stopped Seroqueal as unclear if actually taking on top of Invega, was reported by patient when he established care here, but now says does not take. Per  trying to get him reestablish with Dr. Treviño or with Psych here.   - Follow up in a month to check in on status with Psych    Morbid obesity (H)  Type 2 diabetes mellitus without complication, unspecified whether long term insulin use (H)  Pt having improved control of sugars with Metformin added. Unclear if taking Glipizide, was reported taking when establishing care in September. Suggest patient return in a month for A1C check and to bring in all medications he is taking. If A1C is improved can look at stopping Glipizide and trying GLP-1 or SLGT2i. So far has softer BP's and has morbid obesity, which his Invega could be contributing to so a GLP-1 may be helpful with weight loss.   - Follow up in a month for A1C and med check    High priority for 2019-nCoV vaccine  - COVID-19,PF,MODERNA (18+ Yrs BOOSTER .25mL)    Return in about 4 weeks (around 12/13/2021) for DM Follow Up.    Preeti Lin MD  [facility] DEXTER Wiggins is a 71 year old who presents for the following health issues     HPI     Follow Up on Invega Shot   Has gotten a home health nurse, will be coming by on Thursday to give shot. Per pt noting taking Seroquel for sleep and reviewed and saw discontinued in past so stopping now. No hallucinations. No SI or HI. Mood feels \"good\".       Follow up DM  Pt stating only taking metformin and insulin. Sugar is 145 here. Checking once a day ranging from 100-170's. No SOB. No CP. No n/t. Doesn't want to split insulin. Says not getting glipized though ordered.  Will bring meds next time to double check what he is taking. Lifts and walks. Drinks diet pop. Tolerating max " "dose of metformin.           Review of Systems   Constitutional, HEENT, cardiovascular, pulmonary, gi and gu systems are negative, except as otherwise noted.      Objective    /80   Pulse 86   Temp 98.1  F (36.7  C) (Oral)   Resp 16   Ht 1.716 m (5' 7.56\")   Wt 118.2 kg (260 lb 9.6 oz)   SpO2 97%   BMI 40.14 kg/m    Body mass index is 40.14 kg/m .  Physical Exam   General: Alert and oriented, in no acute distress.  Skin: Warm and dry, no abnormalities noted.  Eyes: Extra-ocular muscles intact, pupils equal and reactive.  ENT: Speech intact, nasal passages open, no hearing impairment noted.  CV: No cyanosis or pallor, warm and well perfused.  Respiratory: No respiratory distress, no accessory muscle use.  Neuro: Gait and station normal, comprehension intact. Gross and fine motor skills intact.    Extremities: Warm, able to move all four extremities at will.  Mental Status  1. Appearance:          Casually dressed, Adequately groomed, Dressed appropriately for weather and Appears stated age  2 Behavior             Appropriate    3 Reported Mood   \"good\"  4 Affect                 Appropriate/mood-congruent and Blunted/Flat  5 Thought Form   Goal directed  6. Thought Content:  no overt psychosis and denies suicidal ideation, intent or thoughts, no hallucinations   7. Speech :  slowed  8.Sensorium:  Alert  9. Attention/Concentration:  Normal  10. Memory:  Immediate recall intact, Short-term memory intact and Long-term memory intact  11. Computation:  Intact  12. Language:  Intact  13. Fund of Knowledge/Intelligence:  Average  14. Language: Intact  15. Judgement:Good  16. Insight:  Adequate                  "

## 2021-11-19 ENCOUNTER — DOCUMENTATION ONLY (OUTPATIENT)
Dept: FAMILY MEDICINE | Facility: CLINIC | Age: 71
End: 2021-11-19
Payer: COMMERCIAL

## 2021-11-24 DIAGNOSIS — Z53.9 DIAGNOSIS NOT YET DEFINED: Primary | ICD-10-CM

## 2021-12-14 ENCOUNTER — OFFICE VISIT (OUTPATIENT)
Dept: FAMILY MEDICINE | Facility: CLINIC | Age: 71
End: 2021-12-14
Payer: COMMERCIAL

## 2021-12-14 VITALS
OXYGEN SATURATION: 96 % | DIASTOLIC BLOOD PRESSURE: 82 MMHG | TEMPERATURE: 98.1 F | RESPIRATION RATE: 16 BRPM | SYSTOLIC BLOOD PRESSURE: 110 MMHG | HEART RATE: 77 BPM | HEIGHT: 68 IN | WEIGHT: 260 LBS | BODY MASS INDEX: 39.4 KG/M2

## 2021-12-14 DIAGNOSIS — E03.9 HYPOTHYROIDISM, UNSPECIFIED TYPE: ICD-10-CM

## 2021-12-14 DIAGNOSIS — E11.9 TYPE 2 DIABETES MELLITUS WITHOUT COMPLICATION, UNSPECIFIED WHETHER LONG TERM INSULIN USE (H): Primary | ICD-10-CM

## 2021-12-14 LAB
HBA1C MFR BLD: 6 % (ref 0–5.6)
TSH SERPL DL<=0.005 MIU/L-ACNC: 4.38 MU/L (ref 0.4–4)

## 2021-12-14 PROCEDURE — 99214 OFFICE O/P EST MOD 30 MIN: CPT | Mod: GC | Performed by: STUDENT IN AN ORGANIZED HEALTH CARE EDUCATION/TRAINING PROGRAM

## 2021-12-14 PROCEDURE — 84443 ASSAY THYROID STIM HORMONE: CPT | Performed by: STUDENT IN AN ORGANIZED HEALTH CARE EDUCATION/TRAINING PROGRAM

## 2021-12-14 PROCEDURE — 36415 COLL VENOUS BLD VENIPUNCTURE: CPT | Performed by: STUDENT IN AN ORGANIZED HEALTH CARE EDUCATION/TRAINING PROGRAM

## 2021-12-14 PROCEDURE — 83036 HEMOGLOBIN GLYCOSYLATED A1C: CPT | Performed by: STUDENT IN AN ORGANIZED HEALTH CARE EDUCATION/TRAINING PROGRAM

## 2021-12-14 RX ORDER — LANCETS 21 GAUGE
1 EACH MISCELLANEOUS DAILY
Qty: 100 EACH | Refills: 4 | Status: SHIPPED | OUTPATIENT
Start: 2021-12-14 | End: 2022-08-03

## 2021-12-14 ASSESSMENT — MIFFLIN-ST. JEOR: SCORE: 1911.85

## 2021-12-14 NOTE — LETTER
December 14, 2021      Feliciano Ross  [address] [facility] [address]        Dear ,    We are writing to inform you of your test results.    Your test results fall within the expected range(s) or remain unchanged from previous results.  Please continue with current treatment plan. Can follow up in 3 months.    Resulted Orders   Hemoglobin A1c   Result Value Ref Range    Hemoglobin A1C 6.0 (H) 0.0 - 5.6 %      Comment:      Normal <5.7%   Prediabetes 5.7-6.4%    Diabetes 6.5% or higher     Note: Adopted from ADA consensus guidelines.       If you have any questions or concerns, please call the clinic at the number listed above.       Sincerely,      Preeti Lin MD

## 2021-12-14 NOTE — PROGRESS NOTES
"  Assessment & Plan     Type 2 diabetes mellitus without complication, unspecified whether long term insulin use (H)  Pt with slightly elevated A1C previously, have increased Metformin to Max dose at last visit. A1C is at goal. Continue regimen. Follow up in 3 months.  - Hemoglobin A1c; Future  - Hemoglobin A1c  - Assure Stevan Lancets MISC; Apply 1 each topically daily    Hypothyroidism, unspecified type  Slightly elevated 3 months ago, discussed taking levothyroxine by itself. Will repeat to see if improved with pt taking correctly now. If still elevated will increase by 25 mcg with repeat in 6 weeks.   - TSH; Future  - TSH    Return in about 3 months (around 3/14/2022).    Preeti Lin MD  [facility] DEXTER Wiggins is a 71 year old who presents for the following health issues     HPI     Follow up DM   Sugars have been good, 100-175. No PPP. No hypoglycemic sxs. No diarrhea or upset stomach. Doing well.     Follow up Meds  Brought meds in. All correct          Review of Systems   Constitutional, HEENT, cardiovascular, pulmonary, gi and gu systems are negative, except as otherwise noted.      Objective    /82   Pulse 77   Temp 98.1  F (36.7  C) (Oral)   Resp 16   Ht 1.716 m (5' 7.56\")   Wt 117.9 kg (260 lb)   SpO2 96%   BMI 40.05 kg/m    Body mass index is 40.05 kg/m .  Physical Exam   Exam:   General: Alert and oriented, in no acute distress.  Skin: Warm and dry, no abnormalities noted.  Eyes: Extra-ocular muscles intact, pupils equal and reactive.  ENT: Speech intact, nasal passages open, no hearing impairment noted.  CV: No cyanosis or pallor, warm and well perfused.  Respiratory: No respiratory distress, no accessory muscle use.  Neuro: Gait and station normal, comprehension intact. Gross and fine motor skills intact.   Psychiatric: Mood and affect appear flat  Extremities: Warm, able to move all four extremities at will.    Office Visit on 09/20/2021   Component " Date Value Ref Range Status     Cholesterol 09/20/2021 169  <200 mg/dL Final     Triglycerides 09/20/2021 137  <150 mg/dL Final     Direct Measure HDL 09/20/2021 35* >=40 mg/dL Final     LDL Cholesterol Calculated 09/20/2021 107* <=100 mg/dL Final     Non HDL Cholesterol 09/20/2021 134* <130 mg/dL Final     Patient Fasting > 8hrs? 09/20/2021 No   Final     Sodium 09/20/2021 137  133 - 144 mmol/L Final     Potassium 09/20/2021 4.4  3.4 - 5.3 mmol/L Final     Chloride 09/20/2021 106  94 - 109 mmol/L Final     Carbon Dioxide (CO2) 09/20/2021 27  20 - 32 mmol/L Final     Anion Gap 09/20/2021 4  3 - 14 mmol/L Final     Urea Nitrogen 09/20/2021 12  7 - 30 mg/dL Final     Creatinine 09/20/2021 1.07  0.66 - 1.25 mg/dL Final     Calcium 09/20/2021 9.1  8.5 - 10.1 mg/dL Final     Glucose 09/20/2021 101* 70 - 99 mg/dL Final     GFR Estimate 09/20/2021 70  >60 mL/min/1.73m2 Final    As of July 11, 2021, eGFR is calculated by the CKD-EPI creatinine equation, without race adjustment. eGFR can be influenced by muscle mass, exercise, and diet. The reported eGFR is an estimation only and is only applicable if the renal function is stable.     Creatinine Urine mg/dL 09/20/2021 89  mg/dL Final     Albumin Urine mg/L 09/20/2021 7  mg/L Final     Albumin Urine mg/g Cr 09/20/2021 7.87  0.00 - 17.00 mg/g Cr Final

## 2021-12-14 NOTE — PATIENT INSTRUCTIONS
Patient Education   Here is the plan from today's visit    1. Type 2 diabetes mellitus without complication, unspecified whether long term insulin use (H)  Follow up in 3 months  - Hemoglobin A1c; Future  - Hemoglobin A1c  - Assure Stevan Lancets MISC; Apply 1 each topically daily  Dispense: 100 each; Refill: 4    2. Hypothyroidism, unspecified type  Will get a call or letter with results  - TSH; Future    Please call or return to clinic if your symptoms don't go away.    Follow up plan  Return in about 3 months (around 3/14/2022).    Thank you for coming to [facility] today.  Lab Testing:  **If you had lab testing today and your results are reassuring or normal they will be mailed to you or sent through [facility] within 7 days.   **If the lab tests need quick action we will call you with the results.  **If you are having labs done on a different day, please call [phone] to schedule at [facility] or [phone] for other [facility]patient [facility] locations. Labs do not offer walk-in appointments.  The phone number we will call with results is # [phone] (home) none (work). If this is not the best number please call our clinic and change the number.  Medication Refills:  If you need any refills please call your pharmacy and they will contact us.   If you need to  your refill at a new pharmacy, please contact the new pharmacy directly. The new pharmacy will help you get your medications transferred faster.   Scheduling:  If you have any concerns about today's visit or wish to schedule another appointment please call our office during normal business hours [phone] (8-5:00 M-F)  If a referral was made to an [facility] specialty provider and you do not get a call from central scheduling, please refer to directions on your visit summary or call our office during normal business hours for assistance.   If a Mammogram was ordered for you at the Breast Center call [phone] to  schedule or change your appointment.  If you had an XRay/CT/Ultrasound/MRI ordered the number is [phone] to schedule or change your radiology appointment.   [facility] has limited ultrasound appointments available on Wednesdays, if you would like your ultrasound at [facility], please call [phone] to schedule.   Medical Concerns:  If you have urgent medical concerns please call [phone] at any time of the day.    Preeti Lin MD

## 2021-12-15 DIAGNOSIS — E03.9 HYPOTHYROIDISM, UNSPECIFIED TYPE: ICD-10-CM

## 2021-12-15 RX ORDER — LEVOTHYROXINE SODIUM 100 UG/1
100 TABLET ORAL
Qty: 60 TABLET | Refills: 1 | Status: SHIPPED | OUTPATIENT
Start: 2021-12-15 | End: 2022-03-24

## 2021-12-16 ENCOUNTER — TELEPHONE (OUTPATIENT)
Dept: FAMILY MEDICINE | Facility: CLINIC | Age: 71
End: 2021-12-16
Payer: COMMERCIAL

## 2021-12-16 NOTE — LETTER
"December 21, 2021      Feliciano Ross  [address] [facility] [address]        Dear ,    We attempted to reach you by phone but were unsuccessful. We are writing to inform you of your test results and medication change. Please see the message below from Dr. Lin:    \"Your thyroid level has improved with you taking your medication properly, however it is still mildly elevated. We should go up on your dose of Levothyroxine from 75 mcg to 100 mcg. I sent a new prescription to your pharmacy and then you should have labs drawn in 6 weeks to recheck thyroid levels (this can be a lab only visit) and keep current follow up plan unless you would like to have a visit as well in 6 weeks. A1C is 6 and at goal :D   Thank you!   Dr. Lin\"    Please contact us at [phone] to discuss this further and set up your 6 week follow up.     Ce Bunch Smiley's RN/Preeti Lin MD    Component      Latest Ref Rng & Units 12/14/2021   TSH      0.40 - 4.00 mU/L 4.38 (H)                        "

## 2021-12-16 NOTE — TELEPHONE ENCOUNTER
RN attempted to reach patient, no answer and no VM set up. If patient calls back, please transfer to any available RN.  TAVO Dinh Amanda Sue, MD  P [facility] Triage Nurse  Please call patient with the following message His thyroid level has improved with him taking it properly, however it is still mildly elevated. We should go up on his dose of Levothyroxine from 75 mcg to 100 mcg. I will send a new prescription to his pharmacy and then he should have labs drawn in 6 weeks to recheck thyroid levels (this can be a lab only visit) and keep current follow up plan unless pt would like to have a visit as well in 6 weeks. A1C is 6 and at goal :D   Thank you!

## 2021-12-16 NOTE — PROGRESS NOTES
Preceptor Attestation:   Patient seen, evaluated and discussed with the resident. I have verified the content of the note, which accurately reflects my assessment of the patient and the plan of care.   Supervising Physician:  Ryan Barcenas MD

## 2021-12-21 NOTE — TELEPHONE ENCOUNTER
RN made additional attempt to reach patient, no answer and no VM set up. RN also left message for patient's HC RN Rosa- if either call back, please transfer to any available RN. RN will also send letter and notify provider we have been unable to reach.   Ce Peralta, RN

## 2021-12-21 NOTE — TELEPHONE ENCOUNTER
RN relayed results and med change to TAVO Lee. Reports she will see patient on Thursday and will relay to him as well. No further questions at this time.     Natanael Vera RN

## 2021-12-27 ENCOUNTER — TELEPHONE (OUTPATIENT)
Dept: FAMILY MEDICINE | Facility: CLINIC | Age: 71
End: 2021-12-27
Payer: COMMERCIAL

## 2021-12-27 NOTE — TELEPHONE ENCOUNTER
RN spoke to Marilin- states she was just calling for verbal orders for SN visits for the injections themselves. Reports patient last received on 12/23/21. Gave verbal orders per protocol as requested. Nurse verbalized understanding.    Marilin states she is covering for Noris patient's normal  RN but is unsure if patient has re-established with Dr. Treviño or is seeing another psychiatrist locally. She will follow up to ensure that patient has established in order to prevent laps in his medication.     Ce Peralta, RN

## 2021-12-27 NOTE — TELEPHONE ENCOUNTER
[facility] phone call message - order or referral request for patient:     Order or referral being requested: Skilled nursing order monthly for injection. 1 PRN visit       Additional Comments: Marilin @ [phone]  Going to quentin madalyn, RN states she called last week and hadn't heard anything yet. No encounter was entered.    OK to leave a message on voice mail? Yes    Primary language: English      needed? No    Call taken on December 27, 2021 at 11:02 AM by Roberta Ramon

## 2021-12-27 NOTE — TELEPHONE ENCOUNTER
RN attempted to reach THEO Gaston with call back number. Please transfer to any available RN.   Ce Peralta RN      Wanting to clarify if this is just approval for skilled nursing visits- patient was to be re-establishing with psychiatry Dr. Treviño regarding his Invega injections as we are not prescribing.

## 2021-12-29 NOTE — TELEPHONE ENCOUNTER
Will CC care coordination to see if an appointment with Dr. Hudson corbin be made.    Preeti Lin MD

## 2021-12-30 DIAGNOSIS — E11.9 TYPE 2 DIABETES MELLITUS WITHOUT COMPLICATION, UNSPECIFIED WHETHER LONG TERM INSULIN USE (H): ICD-10-CM

## 2021-12-30 NOTE — TELEPHONE ENCOUNTER

## 2021-12-31 NOTE — TELEPHONE ENCOUNTER
12/30/21  is now in Private Practice. Left message on Clinic voicemail on 12/30.  [facility]  [address]  [address]  [facility] [address]  [phone] Office  [phone] Fax    12/31/21 Reached out to Clinic today, Clinic is closed until Monday 1/3/22 in observance of the Holiday.    Adriana Wright  Care Coordinator

## 2022-01-05 NOTE — TELEPHONE ENCOUNTER
"Spoke to Zoya regarding getting patient back into see . Per Zoya, she \"will need to speak to  first and will call me directly once she confirms ok to schedule\".     Adriana Wright  Pronouns: She/Her/Hers  Care Coordinator  [facility]  [phone]    "

## 2022-01-10 ENCOUNTER — DOCUMENTATION ONLY (OUTPATIENT)
Dept: FAMILY MEDICINE | Facility: CLINIC | Age: 72
End: 2022-01-10
Payer: COMMERCIAL

## 2022-01-12 NOTE — TELEPHONE ENCOUNTER
"9:21a.m  received call back from Zoya. Per Zoya, \"I did speak to , we have terminated care and patient is no longer active in our system as of 2020\". I asked Zoya why patient's care was terminated. Per Zoya, patient missed appointments, we can no longer schedule for him\". Forwarding message to  as GILBERTO Wright  Pronouns: She/Her/Hers  Care Coordinator  [facility]  [phone]    "

## 2022-01-12 NOTE — TELEPHONE ENCOUNTER
1/12/22 RADHA has not yet heard back from Zoya with 's Clinic. CC attempted again to reach out, had to leave voicemail on clinic voicemail, along with direct number.    Adriana Wright  Pronouns: She/Her/Hers  Care Coordinator  [facility]  [phone]

## 2022-01-13 NOTE — PATIENT INSTRUCTIONS
Scheduling:  If you had an XRay/CT/Ultrasound/MRI ordered the number is [phone] to schedule or change your radiology appointment.   [facility] [employer] - SERVICES  For Men   Review these tests with your doctor then decide which ones you want and take this page home for your reference     Immunization History   Administered Date(s) Administered     COVID-19,PF,Moderna 01/25/2021, 02/22/2021, 11/15/2021     COVID-19,PF,Moderna Booster 11/15/2021     FLU 6-35 months 12/11/2012, 09/23/2013, 11/02/2015     Flu, Unspecified 11/02/2009, 09/30/2011     HepB, Unspecified 09/29/2009     HepB-Adult 01/09/2007, 06/14/2007     Influenza (H1N1) 01/06/2010     Influenza (High Dose) 3 valent vaccine 12/18/2018, 01/20/2020     Influenza (IIV3) PF 12/08/2003, 12/08/2006, 12/11/2012, 09/23/2013, 09/16/2014     Influenza Vaccine IM > 6 months Valent IIV4 (Alfuria,Fluzone) 10/17/2016, 11/10/2016     Influenza, Quad, High Dose, Pf, 65yr+ (Fluzone HD) 09/20/2021     MMR 09/29/2009     Pneumococcal 23 valent 09/07/2021     Tdap (Adacel,Boostrix) 09/07/2021     Varicella 09/29/2009                                                       IMMUNIZATIONS Description Recommend today?     Influenza (flu shot) Helps to prevent flu; you should get it every year No; is up to date.   PCV 13 Prevents pneumonia; given once No; is up to date.   PPSV 23 Prevents pneumonia; given once No; is up to date.   Tetanus Prevents tetanus; need every 10 years No; is up to date.   Herpes Zoster (shingles) Prevents or lessens symptoms from shingles; given once.    If you want this vaccine please go to a pharmacy to receive Shinrix   Hepatitis B  If you have any of the following risk factors you should be immunized for hepatitis B: severe kidney disease, people who live in the same house as a carrier of Hepatitis B virus, people who live in  institutions (e.g. nursing homes or group homes), homosexual men, patients with hemophilia  who received Factor VIII or IX concentrates, abusers of illicit injectable drugs No; is up to date.     Health Maintenance   Topic Date Due     ADVANCE CARE PLANNING  Never done     ZOSTER IMMUNIZATION (1 of 2) Never done     LUNG CANCER SCREENING  Never done     AORTIC ANEURYSM SCREENING (SYSTEM ASSIGNED)  Never done     MEDICARE ANNUAL WELLNESS VISIT  01/06/2018     FALL RISK ASSESSMENT  03/19/2021     PHQ-2  01/01/2022     A1C  03/14/2022     EYE EXAM  08/20/2022     BMP  09/20/2022     LIPID  09/20/2022     MICROALBUMIN  09/20/2022     DIABETIC FOOT EXAM  09/20/2022     COLORECTAL CANCER SCREENING  02/09/2025     DTAP/TDAP/TD IMMUNIZATION (2 - Td or Tdap) 09/07/2031     HEPATITIS C SCREENING  Completed     INFLUENZA VACCINE  Completed     Pneumococcal Vaccine: 65+ Years  Completed     COVID-19 Vaccine  Completed     IPV IMMUNIZATION  Aged Out     MENINGITIS IMMUNIZATION  Aged Out         SCREENING TESTS     Description   Year of Last Screening   Recommended Today?   Heart disease screening blood tests    Cholesterol level Reducing cholesterol can reduce your risk of heart attacks by 25%.  Screening is recommended yearly if you are at risk of heart disease otherwise every 4-5 years  No; is up to date.     Diabetes screening tests    Hemoglobin A1c blood test   Finding and treating diabetes early can reduce complications.  Screening recommended/covered yearly if you have high blood pressure, high cholesterol, obesity (BMI >30), or a history of high blood glucose tests; or 2 of the following: family history of diabetes, overweight (BMI >25 but <30), age 65 years or older, and a history of diabetes of pregnancy or gave birth to baby weighing more than 9 lbs.    No; is up to date.   Hepatitis B screening Finding hepatitis B early can reduce complications.  Screening is recommended for persons with selected risk factors.  No: is not indicated today.   Hepatitis C screening Finding hepatitis C early can reduce  complications.  Screening is recommended for all persons born from 1945 through 1965 and for those with selected other risk factors.   Yes; Recommended and ordered.   HIV screening Finding HIV early can reduce complications.  Screening is recommended for persons with risk factors for HIV infection.  Yes; Recommended and ordered.   Glaucoma screening Early detection of glaucoma can prevent blindness.   Please talk to your eye doctor about this.       SCREENING TESTS     Description   Year of Last Screening   Recommended Today?   Colorectal cancer screening    Fecal occult blood test     Screening colonoscopy Screening for colon cancer has been shown to reduce death from colon cancer by 25-30%. Screening recommended to start at 50 years and continuing until age 75 years.    Recommeded and patient declined.    Screening for Lung Cancer     Low-dose CT scanning Screening can reduce mortality in persons aged 55-80 who have smoked at least 30 pack-years and who are either still smoking or have quit in the past 15 years.  Yes; Recommended and ordered.   Abdominal Aortic Aneurysm (AAA) screening    Ultrasound (US)   An aneurysm treated before rupture is very safe -a ruptured aneurysm can be fatal.  Screening  by US for AAA is limited to patients who meet one of the following criteria:    Men who are 65-75 years old and have smoked more than 100 cigarettes in their lifetime    Anyone with a family history of abdominal aortic aneurysm  Yes; Recommended and ordered.     MEDICARE WELLNESS EXAM PATIENT INSTRUCTIONS    Yearly exam:     See your health care provider every year in order to review changes in your health, review medicines that you take, and discuss preventive care needs such as immunizations and cancer screening.    Get a flu shot each year.     Advance Directive:    If you have not done so, you are encouraged to complete an advance directive. If you would like support with this, please contact the clinic social  worker through the main clinic line. More information about advance directives can be found at:     [email or web address]    Nutrition:     Eat at least 5 servings of fruits and vegetables each day.     Eat whole-grain bread, whole-wheat pasta and brown rice instead of white grains and rice.     Talk to your doctor about Calcium and Vitamin D.     Lifestyle:    Exercise for at least 150 minutes a week (30 minutes a day, 5 days a week). This will help you control your weight and prevent disease.     Limit alcohol to one drink per day.     If you smoke, try to quit - your doctor will be happy to help.     Wear sunscreen to prevent skin cancer.     See your dentist every six months for an exam and cleaning.     See your eye doctor every 1 to 2 years to screen for conditions such as glaucoma, macular degeneration and cataracts.      Patient Education   Personalized Prevention Plan  You are due for the preventive services outlined below.  Your care team is available to assist you in scheduling these services.  If you have already completed any of these items, please share that information with your care team to update in your medical record.  Health Maintenance Due   Topic Date Due     Discuss Advance Care Planning  Never done     Zoster (Shingles) Vaccine (1 of 2) Never done     LUNG CANCER SCREENING  Never done     AORTIC ANEURYSM SCREENING (SYSTEM ASSIGNED)  Never done     Annual Wellness Visit  01/06/2018     FALL RISK ASSESSMENT  03/19/2021        Lung Cancer Screening   Frequently Asked Questions  If you are at high-risk for lung cancer, getting screened with low-dose computed tomography (LDCT) every year can help save your life. This handout offers answers to some of the most common questions about lung cancer screening. If you have other questions, please call [phone] ([phone]).     What is it?  Lung cancer screening uses special X-ray technology to  create an image of your lung tissue. The exam is quick and easy and takes less than 10 seconds. We don t give you any medicine or use any needles. You can eat before and after the exam. You don t need to change your clothes as long as the clothing on your chest doesn t contain metal. But, you do need to be able to hold your breath for at least 6 seconds during the exam.    What is the goal of lung cancer screening?  The goal of lung cancer screening is to save lives. Many times, lung cancer is not found until a person starts having physical symptoms. Lung cancer screening can help detect lung cancer in the earliest stages when it may be easier to treat.    Who should be screened for lung cancer?  We suggest lung cancer screening for anyone who is at high-risk for lung cancer. You are in the high-risk group if you:      are between the ages of 55 and 79, and    have smoked at least 1 pack of cigarettes a day for 30 or more years, and    still smoke or have quit within the past 15 years.    However, if you have a new cough or shortness of breath, you should talk to your doctor before being screened.    Some national lung health advocacy groups also recommend screening for people ages 50 to 79 who have smoked an average of 1 pack of cigarettes a day for 20 years. They must also have at least 1 other risk factor for lung cancer, not including exposure to secondhand smoke. Other risk factors are having had cancer in the past, emphysema, pulmonary fibrosis, COPD, a family history of lung cancer, or exposure to certain materials such as arsenic, asbestos, beryllium, cadmium, chromium, diesel fumes, nickel, radon or silica. Your care team can help you know if you have one of these risk factors.     Why does it matter if I have symptoms?  Certain symptoms can be a sign that you have a condition in your lungs that should be checked and treated by your doctor. These symptoms include fever, chest pain, a new or changing cough,  shortness of breath that you have never felt before, coughing up blood or unexplained weight loss. Having any of these symptoms can greatly affect the results of lung cancer screening.       Should all smokers get an LDCT lung cancer screening exam?  It depends. Lung cancer screening is for a very specific group of men and women who have a history of heavy smoking over a long period of time (see  Who should be screened for lung cancer  above).  I am in the high-risk group, but have been diagnosed with cancer in the past. Is LDCT lung cancer screening right for me?  In some cases, you should not have LDCT lung screening, such as when your doctor is already following your cancer with CT scan studies. Your doctor will help you decide if LDCT lung screening is right for you.  Do I need to have a screening exam every year?  Yes. If you are in the high-risk group described earlier, you should get an LDCT lung cancer screening exam every year until you are 79, or are no longer willing or able to undergo screening and possible procedures to diagnose and treat lung cancer.  How effective is LDCT at preventing death from lung cancer?  Studies have shown that LDCT lung cancer screening can lower the risk of death from lung cancer by 20 percent in people who are at high-risk.  What are the risks?  There are some risks and limitations of LDCT lung cancer screening. We want to make sure you understand the risks and benefits, so please let us know if you have any questions. Your doctor may want to talk with you more about these risks.    Radiation exposure: As with any exam that uses radiation, there is a very small increased risk of cancer. The amount of radiation in LDCT is small--about the same amount a person would get from a mammogram. Your doctor orders the exam when he or she feels the potential benefits outweigh the risks.    False negatives: No test is perfect, including LDCT. It is possible that you may have a medical  condition, including lung cancer, that is not found during your exam. This is called a false negative result.    False positives and more testing: LDCT very often finds something in the lung that could be cancer, but in fact is not. This is called a false positive result. False positive tests often cause anxiety. To make sure these findings are not cancer, you may need to have more tests. These tests will be done only if you give us permission. Sometimes patients need a treatment that can have side effects, such as a biopsy. For more information on false positives, see  What can I expect from the results?     Findings not related to lung cancer: Your LDCT exam also takes pictures of areas of your body next to your lungs. In a very small number of cases, the CT scan will show an abnormal finding in one of these areas, such as your kidneys, adrenal glands, liver or thyroid. This finding may not be serious, but you may need more tests. Your doctor can help you decide what other tests you may need, if any.  What can I expect from the results?  About 1 out of 4 LDCT exams will find something that may need more tests. Most of the time, these findings are lung nodules. Lung nodules are very small collections of tissue in the lung. These nodules are very common, and the vast majority--more than 97 percent--are not cancer (benign). Most are normal lymph nodes or small areas of scarring from past infections.  But, if a small lung nodule is found to be cancer, the cancer can be cured more than 90 percent of the time. To know if the nodule is cancer, we may need to get more images before your next yearly screening exam. If the nodule has suspicious features (for example, it is large, has an odd shape or grows over time), we will refer you to a specialist for further testing.  Will my doctor also get the results?  Yes. Your doctor will get a copy of your results.  Is it okay to keep smoking now that there s a cancer screening  exam?  No. Tobacco is one of the strongest cancer-causing agents. It causes not only lung cancer, but other cancers and cardiovascular (heart) diseases as well. The damage caused by smoking builds over time. This means that the longer you smoke, the higher your risk of disease. While it is never too late to quit, the sooner you quit, the better.  Where can I find help to quit smoking?  The best way to prevent lung cancer is to stop smoking. If you have already quit smoking, congratulations and keep it up! For help on quitting smoking, please call [employer] at 5-743-082-NZYR ([phone]) or the [employer] at 1-506.777.2575 to find local resources near you.  One-on-one health coaching:  If you d prefer to work individually with a health care provider on tobacco cessation, we offer:      Medication Therapy Management:  Our specially trained pharmacists work closely with you and your doctor to help you quit smoking.  Call [phone] or [phone] (toll free).     Can Do: Health coaching offered by [facility].  [email or web address]

## 2022-01-14 ENCOUNTER — OFFICE VISIT (OUTPATIENT)
Dept: FAMILY MEDICINE | Facility: CLINIC | Age: 72
End: 2022-01-14
Payer: COMMERCIAL

## 2022-01-14 VITALS
HEART RATE: 94 BPM | BODY MASS INDEX: 37.92 KG/M2 | DIASTOLIC BLOOD PRESSURE: 76 MMHG | HEIGHT: 68 IN | SYSTOLIC BLOOD PRESSURE: 99 MMHG | TEMPERATURE: 97.6 F | RESPIRATION RATE: 16 BRPM | OXYGEN SATURATION: 98 % | WEIGHT: 250.2 LBS

## 2022-01-14 DIAGNOSIS — Z11.4 SCREENING FOR HIV (HUMAN IMMUNODEFICIENCY VIRUS): ICD-10-CM

## 2022-01-14 DIAGNOSIS — Z87.891 PERSONAL HISTORY OF TOBACCO USE: ICD-10-CM

## 2022-01-14 DIAGNOSIS — E66.01 MORBID OBESITY (H): ICD-10-CM

## 2022-01-14 DIAGNOSIS — E03.9 HYPOTHYROIDISM, UNSPECIFIED TYPE: ICD-10-CM

## 2022-01-14 DIAGNOSIS — B18.2 CHRONIC HEPATITIS C WITHOUT HEPATIC COMA (H): ICD-10-CM

## 2022-01-14 DIAGNOSIS — Z86.19 HX OF HEPATITIS C: ICD-10-CM

## 2022-01-14 DIAGNOSIS — F20.0 PARANOID SCHIZOPHRENIA (H): ICD-10-CM

## 2022-01-14 DIAGNOSIS — Z00.00 ENCOUNTER FOR MEDICARE ANNUAL WELLNESS EXAM: Primary | ICD-10-CM

## 2022-01-14 DIAGNOSIS — E11.9 TYPE 2 DIABETES MELLITUS WITHOUT COMPLICATION, UNSPECIFIED WHETHER LONG TERM INSULIN USE (H): ICD-10-CM

## 2022-01-14 LAB
ERYTHROCYTE [DISTWIDTH] IN BLOOD BY AUTOMATED COUNT: 13.8 % (ref 10–15)
HCT VFR BLD AUTO: 45.3 %
HGB BLD-MCNC: 14.9 G/DL (ref 13.3–17.7)
MCH RBC QN AUTO: 30.5 PG (ref 26.5–33)
MCHC RBC AUTO-ENTMCNC: 32.9 G/DL (ref 31.5–36.5)
MCV RBC AUTO: 93 FL (ref 78–100)
PLATELET # BLD AUTO: 271 10E3/UL (ref 150–450)
RBC # BLD AUTO: 4.89 10E6/UL (ref 4.4–5.9)
TSH SERPL DL<=0.005 MIU/L-ACNC: 1.82 MU/L (ref 0.4–4)
WBC # BLD AUTO: 9.3 10E3/UL (ref 4–11)

## 2022-01-14 PROCEDURE — 36415 COLL VENOUS BLD VENIPUNCTURE: CPT | Performed by: STUDENT IN AN ORGANIZED HEALTH CARE EDUCATION/TRAINING PROGRAM

## 2022-01-14 PROCEDURE — 87389 HIV-1 AG W/HIV-1&-2 AB AG IA: CPT | Performed by: STUDENT IN AN ORGANIZED HEALTH CARE EDUCATION/TRAINING PROGRAM

## 2022-01-14 PROCEDURE — 99214 OFFICE O/P EST MOD 30 MIN: CPT | Mod: 25 | Performed by: STUDENT IN AN ORGANIZED HEALTH CARE EDUCATION/TRAINING PROGRAM

## 2022-01-14 PROCEDURE — G0439 PPPS, SUBSEQ VISIT: HCPCS | Mod: GC | Performed by: STUDENT IN AN ORGANIZED HEALTH CARE EDUCATION/TRAINING PROGRAM

## 2022-01-14 PROCEDURE — G0296 VISIT TO DETERM LDCT ELIG: HCPCS | Performed by: STUDENT IN AN ORGANIZED HEALTH CARE EDUCATION/TRAINING PROGRAM

## 2022-01-14 PROCEDURE — 87902 NFCT AGT GNTYP ALYS HEP C: CPT | Performed by: STUDENT IN AN ORGANIZED HEALTH CARE EDUCATION/TRAINING PROGRAM

## 2022-01-14 PROCEDURE — 85027 COMPLETE CBC AUTOMATED: CPT | Performed by: STUDENT IN AN ORGANIZED HEALTH CARE EDUCATION/TRAINING PROGRAM

## 2022-01-14 PROCEDURE — 86803 HEPATITIS C AB TEST: CPT | Performed by: STUDENT IN AN ORGANIZED HEALTH CARE EDUCATION/TRAINING PROGRAM

## 2022-01-14 PROCEDURE — 84443 ASSAY THYROID STIM HORMONE: CPT | Performed by: STUDENT IN AN ORGANIZED HEALTH CARE EDUCATION/TRAINING PROGRAM

## 2022-01-14 RX ORDER — LEVOTHYROXINE SODIUM 75 UG/1
TABLET ORAL
COMMUNITY
Start: 2021-12-15 | End: 2022-07-29

## 2022-01-14 ASSESSMENT — MIFFLIN-ST. JEOR: SCORE: 1874.4

## 2022-01-14 ASSESSMENT — LIFESTYLE VARIABLES
HOW MANY STANDARD DRINKS CONTAINING ALCOHOL DO YOU HAVE ON A TYPICAL DAY: 1 OR 2
HOW OFTEN DO YOU HAVE A DRINK CONTAINING ALCOHOL: 2-3 TIMES A WEEK
HOW OFTEN DO YOU HAVE SIX OR MORE DRINKS ON ONE OCCASION: NEVER

## 2022-01-14 NOTE — PROGRESS NOTES
>67 year old Wellness Visit ( Medicare etc)         HPI       This 71 year old male presents as an established patient  Dr. Lin who presents for a  Annual Wellness Exam.    Other issues patient wants to address today:    none    Visual Acuity: :  Testing not done; patient has seen eye doctor in the past 12 months.    Patient Active Problem List   Diagnosis     Paranoid schizophrenia (H)     Obesity     SK (seborrheic keratosis)     Cherry hemangioma     Dermatofibroma     Chronic hepatitis C without hepatic coma (H)     Manic behavior (H)     Hypothyroidism     Right inguinal hernia     SIADH (syndrome of inappropriate ADH production) (H)     Type 2 diabetes mellitus without complication, unspecified whether long term insulin use (H)     Morbid obesity (H)       Past Medical History:   Diagnosis Date     Depressive disorder      Manic affective disorder with recurrent episode (H)      Subclinical hypothyroidism 5/27/2016     Type 2 diabetes mellitus without complication, unspecified whether long term insulin use (H) 9/11/2021        Family History   Problem Relation Age of Onset     Diabetes Mother         Type 2     Diabetes Brother         Type 2     Breast Cancer No family hx of      Cancer - colorectal No family hx of      Prostate Cancer No family hx of      Hypertension No family hx of      C.A.D. No family hx of      Cancer No family hx of      Respiratory No family hx of          Problem List, Family History and past Medical History reviewed and unchanged/updated.       Health risk Assessment/ Review of Systems:       Constitutional:   Fevers or night sweats?  NO      Eyes:   Vision problems?  NO            Hearing:  Do you feel you have hearing loss?  NO  Cardiovascular:  Chest pain, palpitations, or pain with walking?  NO        Respiratory:   Breathing problems or cough?  Yes  :   Difficulty controlling urination?  NO        Musculoskeletal:   Stiffness or sore joints?  NO            Skin:    concerning lesions or moles?  NO           Nervous System:   loss of strength or sensation,  numbness or tingling,  tremor,  dizziness,  headache?  NO         Mental Health:   depression or anxiety,  sleep problems?  NO   Cognition:  Memory problems?  NO       Weight Loss: Have you lost 10 or more pounds unintentionally in the previous year?  NO  Energy: How much of the time during the past 3 weeks did you feel tired?   (check one of the following):   ?  All of the time  ? Most of the time  ? Some of the time  ? A little of the time  ? None of the Time    PHQ-2 Score:   PHQ-2 ( 1999 Pfizer) 1/14/2022 12/14/2021   Q1: Little interest or pleasure in doing things 0 0   Q2: Feeling down, depressed or hopeless 0 0   PHQ-2 Score 0 0   PHQ-2 Total Score (12-17 Years)- Positive if 3 or more points; Administer PHQ-A if positive - 0       PHQ-9 Score:   [facility] Follow[facility] to [facility] 5/27/2016   PHQ-9 9. Suicide Ideation past 2 weeks Not at all     Medical Care:     What other specialists or organizations are involved in your medical care?  Psychiatrist at [place] [place] [facility] 2/7   Patient Care Team       Relationship Specialty Notifications Start End    Preeti Lin MD PCP - General Family Medicine  9/7/21     Phone: [phone] Fax: [phone]         [address] [place] [address]    Drea Martines RN Clinic Care Coordinator   12/5/17     [facility]  [phone]    Preeti Lin MD Assigned PCP   10/3/21     Phone: [phone] Fax: [phone]         [address] [place] [address]          Do you have an advanced directive? No-ACP Packet given to patient.      Social History / Home Safety     Social History     Tobacco Use     Smoking status: Former Smoker     Packs/day: 1.50     Years: 50.00     Pack years: 75.00     Types: Cigarettes     Smokeless tobacco: Former User     Quit date: 06/2018   Substance Use Topics     Alcohol use: Yes     Comment: 1-2 beers miriam 3 months      Marital Status:Single  Who lives in your household?Himself and couple family.  Does your home have any of the following safety concerns? Loose rugs in the hallway, no grab bars in the bathroom, no handrails on the stairs or have poorly lit areas?  No   Do you feel threatened or controlled by a partner, ex-partner or anyone in your life? {Yes No   Has anyone hurt you physically, for example by pushing, hitting, slapping or kicking you   or forcing you to have sex? No       Functional Status     Do you need help with dressing yourself, bathing, or walking?No   Do you need help with the phone, transportation, shopping, preparing meals, housework, laundry, medications or managing money?No   By yourself and not using aids, do you have any difficulty walking up 10 steps  without resting? No   By yourself and not using aids, do you have any difficulty walking several hundred yards? No              Risk Behaviors and Healthy Habits     History   Smoking Status     Former Smoker     Packs/day: 1.50     Years: 50.00     Types: Cigarettes   Smokeless Tobacco     Former User     Quit date: 06/2018     How many servings of fruits and vegetables do you eat a day? 2 or 3   Exercise: walking  1 day/week for an average of 15-30 minutes  Do you frequently drive without a seatbelt? No   History   Smoking Status     Former Smoker     Packs/day: 1.50     Years: 50.00     Types: Cigarettes   Smokeless Tobacco     Former User     Quit date: 06/2018     Do you use any other drugs? No       Do you use alcohol?Yes      Functional Ability   Was the patient's timed Up & Go test (Get up from chair walk, 10 feet turn, return to chair and sit down) unsteady or longer than 10 seconds? No     Fall risk:     1. Have you fallen two or more times in the past year? No   2. Have you fallen and had an injury in the past year?  No         Evaulation of Cognitive Function     Family member/caregiver input: Normal      1) Repeat 3 items (Leader, Season,  Table)    2) Clock draw: NORMAL  3) 3 item recall: Recalls 3 objects  Results: 3 items recalled: COGNITIVE IMPAIRMENT LESS LIKELY    Mini-CogTM Copyright REAGAN Littlejohn. Licensed by the author for use in [facility]; reprinted with permission (brooklyn@[facility]). All rights reserved.              Other Assessments:     CV Risk based on Pooled Cohort Risk (consider assessing every 4-6 years; consider statin in patients with 10-yr risk > 7.5%):     The 10-year ASCVD risk score (Montreatmary MORENO Jr., et al., 2013) is: 21.9%    Values used to calculate the score:      Age: 71 years      Sex: Male      Is Non- : No      Diabetic: Yes      Tobacco smoker: No      Systolic Blood Pressure: 99 mmHg      Is BP treated: No      HDL Cholesterol: 35 mg/dL      Total Cholesterol: 169 mg/dL    Advance Directives: Discussed with patient and family as appropriate.  Has patient completed advance directives? No, advance care planning information given to patient to review.  Patient declined advance care planning discussion at this time.                      Immunization History   Administered Date(s) Administered     COVID-19,PF,Moderna 01/25/2021, 02/22/2021, 11/15/2021     COVID-19,PF,Moderna Booster 11/15/2021     FLU 6-35 months 12/11/2012, 09/23/2013, 11/02/2015     Flu, Unspecified 11/02/2009, 09/30/2011     HepB, Unspecified 09/29/2009     HepB-Adult 01/09/2007, 06/14/2007     Influenza (H1N1) 01/06/2010     Influenza (High Dose) 3 valent vaccine 12/18/2018, 01/20/2020     Influenza (IIV3) PF 12/08/2003, 12/08/2006, 12/11/2012, 09/23/2013, 09/16/2014     Influenza Vaccine IM > 6 months Valent IIV4 (Alfuria,Fluzone) 10/17/2016, 11/10/2016     Influenza, Quad, High Dose, Pf, 65yr+ (Fluzone HD) 09/20/2021     MMR 09/29/2009     Pneumococcal 23 valent 09/07/2021     Tdap (Adacel,Boostrix) 09/07/2021     Varicella 09/29/2009     Reviewed Immunization Record Today    Physical Exam     Vitals: BP 99/76   Pulse 94    "Temp 97.6  F (36.4  C) (Oral)   Resp 16   Ht 1.727 m (5' 8\")   Wt 113.5 kg (250 lb 3.2 oz)   SpO2 98%   BMI 38.04 kg/m    BMI= Body mass index is 38.04 kg/m .  GENERAL APPEARANCE: alert and no distress  HENT: ear canals patent and TM's normal; mouth without ulcers or lesions; and oral mucous membranes moist, no thyromegaly or nodules felt  Hearing loss screen:     Abnormal - already known hearing loss, has aids   DENTITION:  Good repair?  yes  RESP: lungs clear to auscultation - no rales, rhonchi or wheezes  CV: regular rates and rhythm, no murmur, click or rub, no peripheral edema and peripheral pulses strong  Abd: Soft, nondistended, no tender  SKIN: no suspicious lesions or rashes  NEURO: Normal strength and tone  MENTAL STATUS EXAM  Appearance: appropriate  Attitude: cooperative  Behavior: normal  Eye Contact: normal  Speech: normal  Orientation: oreinted to person , place, time and situation  Mood:  \"okay\"  Affect: Mood Congruent  Thought Process: clear      Assessment and Plan     (Z00.00) Encounter for Medicare annual wellness exam  (Z86.19) Hx of hepatitis C  (primary encounter diagnosis)  (B18.2) Chronic hepatitis C without hepatic coma (H)  Comment: Pt with hx of Hep C in 2016, however never received treatment; but is interested in checking for this again. Will get Hep C with reflex for genotrype and viral load as it is possible he has cleared it over the years.   Plan: Hepatitis C Screen Reflex to HCV RNA Quant and         Genotype, CBC with platelets    (Z87.891) Personal history of tobacco use  Comment: Pt with history of 61 pack year history and stated quite a few months ago, but previously said quite in 2018. Not interested in information or medications to help at this time, but will to get cancer screening after going over as well as screening for a AAA given his smoking history.   Plan: Prof Fee: Shared Decision Making Visit for Lung        Cancer Screening, CT Chest Lung Cancer Scrn Low        " Dose wo, US Abdominal Aorta Imaging    (Z11.4) Screening for HIV (human immunodeficiency virus)  Comment: Give history of Hep C and no labs seen for HIV screening would place patient at high risk and therefore will check and recommend by USPSTF  Plan: HIV Antigen Antibody Combo    (E03.9) Hypothyroidism, unspecified type  Comment: Last labs TSH wnl; can keep at current dose.     (F20.0) Paranoid schizophrenia (H)  Comment: Pt not being followed by Dr. Treviño anymore, but per pt does have an appointment in a few weeks with a new psychiatrist who is prescribing his Invega shots, as there was a concern he would be out with loss of his old provider and unclear if he had a new one.     (E66.01) Morbid obesity (H)  Comment: Discussed cutting back on drinking and working out more with dieting to help with eight and sugars. However he is on an antipsychotic which makes it hard to do.     (E11.9) Type 2 diabetes mellitus without complication, unspecified whether long term insulin use (H)  Comment: Doing well with full dose of metformin and does not want to stop glipizide. Given no hypoglycemia events recently will continue.     Options for treatment and follow-up care were reviewed with the Felicianonieves Ross and/or guardian engaged in the decision making process and verbalized understanding of the options discussed and agreed with the final plan.    Preeti Lin MD  Lung Cancer Screening Shared Decision Making Visit     Feliciano Ross is eligible for lung cancer screening on the basis of the information provided in my signed lung cancer screening order.     I have discussed with patient the risks and benefits of screening for lung cancer with low-dose CT.     The risks include:  radiation exposure: one low dose chest CT has as much ionizing radiation as about 15 chest x-rays or 6 months of background radiation living in [place]    false positives: 96% of positive findings/nodules are NOT cancer, but some might still  require additional diagnostic evaluation, including biopsy  over-diagnosis: some slow growing cancers that might never have been clinically significant will be detected and treated unnecessarily     The benefit of early detection of lung cancer is contingent upon adherence to annual screening or more frequent follow up if indicated.     Furthermore, reaping the benefits of screening requires Feliciano W Cody to be willing and physically able to undergo diagnostic procedures, if indicated. Although no specific guide is available for determining severity of comorbidities, it is reasonable to withhold screening in patients who have greater mortality risk from other diseases.     We did discuss that the only way to prevent lung cancer is to not smoke. Smoking cessation counseling was not given.      I did not offer risk estimation using a calculator such as this one:    ShouldIScreen

## 2022-01-14 NOTE — LETTER
January 19, 2022      Feliciano Ross  [address] [facility] [address]        Dear Feliciano,    Thank you for getting your care at [facility]. Please see below for your test results.    Resulted Orders   TSH   Result Value Ref Range    TSH 1.82 0.40 - 4.00 mU/L   Hepatitis C Screen Reflex to HCV RNA Quant and Genotype   Result Value Ref Range    Hepatitis C Antibody Reactive (A) Nonreactive      Comment:      A reactive result indicates one of the following   1) Current HCV infection   2) Past HCV infection that has resolved or   3) False positivity.     The CDC recommends that a reactive result should be followed by Nucleic acid testing for HCV RNA. If HCV RNA is detected, that indicates current HCV infection.   If HCV RNA is not detected, that indicates either past, resolved HCV infection, or false HCV antibody positivity.    Narrative    Assay performance characteristics have not been established for newborns, infants, and children.   HIV Antigen Antibody Combo   Result Value Ref Range    HIV Antigen Antibody Combo Nonreactive Nonreactive      Comment:      HIV-1 p24 Ag & HIV-1/HIV-2 Ab Not Detected   CBC with platelets   Result Value Ref Range    WBC Count 9.3 4.0 - 11.0 10e3/uL    RBC Count 4.89 4.40 - 5.90 10e6/uL    Hemoglobin 14.9 13.3 - 17.7 g/dL    Hematocrit 45.3 %    MCV 93 78 - 100 fL    MCH 30.5 26.5 - 33.0 pg    MCHC 32.9 31.5 - 36.5 g/dL    RDW 13.8 10.0 - 15.0 %    Platelet Count 271 150 - 450 10e3/uL   Hepatitis C RNA, Quantitative by PCR with Confirmatory Reflex to Genotyping   Result Value Ref Range    Hepatitis C RNA IU/mL Not Detected Not Detected IU/mL    Narrative    The GARRET AmpliPrep/GARRET TaqMan HCV test is a FDA-approved in vitro nucleic acid amplification test for the quantitation of HCV DNA in human plasma (EDTA plasma) or serum using the GARRET AmpliPrep instrument for automated viral nucleic acid extraction and the GARRET TaqMan for the automated real-time PCR amplification and  detection of viral nucleic acid target. Titer results are reported in International Units/mL (IU/mL) using the 1st WHO International standard for HBV for nucleic acid amplification assays.     It appears you have cleared yourself of the Hepatitis C virus so you do not need to take medications to treat it.   If you have any concerns about these results please call and leave a message for me or send a MyChart message to the clinic.    Sincerely,    Preeti Lin MD

## 2022-01-14 NOTE — PROGRESS NOTES
Preceptor Attestation:    I discussed the patient with the resident and evaluated the patient in person. I have verified the content of the note, which accurately reflects my assessment of the patient and the plan of care.   Supervising Physician:  Khoi Sheffield MD.

## 2022-01-17 LAB — HIV 1+2 AB+HIV1 P24 AG SERPL QL IA: NONREACTIVE

## 2022-01-18 LAB
HCV AB SERPL QL IA: REACTIVE
HCV RNA SERPL NAA+PROBE-ACNC: NOT DETECTED IU/ML

## 2022-01-19 ENCOUNTER — ANCILLARY PROCEDURE (OUTPATIENT)
Dept: ULTRASOUND IMAGING | Facility: CLINIC | Age: 72
End: 2022-01-19
Attending: FAMILY MEDICINE
Payer: COMMERCIAL

## 2022-01-19 DIAGNOSIS — Z87.891 PERSONAL HISTORY OF TOBACCO USE: ICD-10-CM

## 2022-01-19 PROBLEM — Z86.19 HX OF HEPATITIS C: Status: ACTIVE | Noted: 2022-01-19

## 2022-01-19 PROCEDURE — 76775 US EXAM ABDO BACK WALL LIM: CPT | Mod: GC | Performed by: RADIOLOGY

## 2022-01-31 ENCOUNTER — DOCUMENTATION ONLY (OUTPATIENT)
Dept: FAMILY MEDICINE | Facility: CLINIC | Age: 72
End: 2022-01-31
Payer: COMMERCIAL

## 2022-01-31 NOTE — PROGRESS NOTES
"When opening a documentation only encounter, be sure to enter in \"Chief Complaint\" Forms and in \" Comments\" Title of form, description if needed.    Feliciano is a 71 year old  male  Form received via: Fax  Form now resides in: Provider Ready    Victoria Orellana MA    Form has been completed by provider.     Form sent out via: Fax to Therapy Alert at Fax Number: [phone]  Patient informed: No, Reason:n/a  Output date: February 2, 2022    Victoria Orellana MA      **Please close the encounter**                "

## 2022-02-04 ENCOUNTER — PATIENT OUTREACH (OUTPATIENT)
Dept: FAMILY MEDICINE | Facility: CLINIC | Age: 72
End: 2022-02-04
Payer: COMMERCIAL

## 2022-02-04 NOTE — PROGRESS NOTES
CC has attempted to reach patient twice to schedule 1 month follow up with . Patient voicemail is not set up, CC unable to leave message for patient. CC sending letter to patient home.    Adriana Wright  Pronouns: She/Her/Hers  Care Coordinator  [facility]  [phone]

## 2022-02-07 ENCOUNTER — DOCUMENTATION ONLY (OUTPATIENT)
Dept: FAMILY MEDICINE | Facility: CLINIC | Age: 72
End: 2022-02-07

## 2022-02-07 DIAGNOSIS — E11.9 TYPE 2 DIABETES MELLITUS WITHOUT COMPLICATION, UNSPECIFIED WHETHER LONG TERM INSULIN USE (H): ICD-10-CM

## 2022-02-07 NOTE — TELEPHONE ENCOUNTER
[facility] phone call message- patient requesting a refill:    Full Medication Name: insulin pen needle 30G X 5 MM      Pharmacy confirmed as   [facility] - [place], [place] - [address]  [address] [id number]  Phone: [phone] Fax: [phone]  : Yes    Additional Comments: Patient called and stated that pharmacy needs a refill prescription sent in order for him to receive his insulin pen needles. He asked if clinic can reach out to pharmacy [phone].    OK to leave a message on voice mail? Yes    Primary language: English      needed? No    Call taken on February 7, 2022 at 3:24 PM by Candice Pitts

## 2022-02-07 NOTE — TELEPHONE ENCOUNTER
Refill request for insulin needle. Last office visit 1/14/22. Last A1C 6.0. RN refilling per diabetic supply protocol.    Natanael Vera RN

## 2022-02-15 NOTE — PROGRESS NOTES
Telephone call to the client's home for annual health risk assessment.  An  was not needed.    Current situation/living environment  Client lives in a duplex with a roommate and the roommate's wife. Client has lived with this couple for many years. He returned there after being in an assisted living while under commitment for a year. Commitment ended 9/5/21.     Activities of daily living (ADL)/instrumental activities of daily living (IADL) and functional issues  Client needs help with the following ADL's: n/a-independent  Client needs help with the following IADL's: shopping and transportation  Client states he is unable to perform the above due to needing support with transportation. [place] bus [place] real accessible in the winter months. Has bus pass that he does use. Goes to grocery store with roommate. Shares cooking duties with roommate. Has ICLS services in place for help picking up meds from pharmacy and getting other shopping needs met as well as socialization. Also has homecare RN who comes weekly to make sure client is compliant with meds. Homecare RN administers monthly antipsychotic injection.       Health concerns for today  Client is doing well. A1c is at target as is BP. He reports compliance with MD appts and meds.   Has patient fallen 2 or more times in the last year? No  Has patient fallen with injury in the last year? No    Other  Importance of safe proper disposal of controlled substances discussed with client and resources for med disposal sites provided.    Cognition/mental health  Client is A&O x3. He denies any MH symptoms at this time. See psychiatry every 3 months at [facility] in [place].     STARS/Med Adherence  Client is non-compliant with the following quality measures: Colon cancer screening  Comments: education efforts    Client's Plan of Care consists of:  Bus passes and ICLS (12 hours per week).    Tameka Martines RN  [facility]  Care Coordinator  [phone]

## 2022-02-22 ENCOUNTER — TELEPHONE (OUTPATIENT)
Dept: FAMILY MEDICINE | Facility: CLINIC | Age: 72
End: 2022-02-22
Payer: COMMERCIAL

## 2022-02-22 NOTE — TELEPHONE ENCOUNTER
Returned call to home care nurse, unable to reach. Left VM with verbal orders per protocol as requested. Left callback number for questions.    Natanael Vera RN

## 2022-02-22 NOTE — TELEPHONE ENCOUNTER
[facility] phone call message - order or referral request from patient:     Order or referral being requested: Requested order     Additional Details: to continue  Skil nursing ones a month for 2 month 1PRN    Referral only -Specialty* Location     OK to leave a message on voice mail? Yes    Primary language: English      needed? No    Call taken on February 22, 2022 at 4:41 PM by XOCHITL Ferraro    Order request route to [facility] TRIAGE   Referrals Route to [facility] (Green/Ada/Purple) CARE COORDINATOR

## 2022-02-28 ENCOUNTER — DOCUMENTATION ONLY (OUTPATIENT)
Dept: FAMILY MEDICINE | Facility: CLINIC | Age: 72
End: 2022-02-28
Payer: COMMERCIAL

## 2022-03-08 DIAGNOSIS — Z53.9 DIAGNOSIS NOT YET DEFINED: Primary | ICD-10-CM

## 2022-03-24 ENCOUNTER — OFFICE VISIT (OUTPATIENT)
Dept: FAMILY MEDICINE | Facility: CLINIC | Age: 72
End: 2022-03-24
Payer: COMMERCIAL

## 2022-03-24 VITALS
SYSTOLIC BLOOD PRESSURE: 112 MMHG | OXYGEN SATURATION: 97 % | DIASTOLIC BLOOD PRESSURE: 82 MMHG | HEIGHT: 68 IN | HEART RATE: 90 BPM | BODY MASS INDEX: 38.04 KG/M2 | TEMPERATURE: 97.7 F

## 2022-03-24 DIAGNOSIS — Z71.6 ENCOUNTER FOR TOBACCO USE CESSATION COUNSELING: ICD-10-CM

## 2022-03-24 DIAGNOSIS — E03.9 HYPOTHYROIDISM, UNSPECIFIED TYPE: ICD-10-CM

## 2022-03-24 DIAGNOSIS — E11.9 TYPE 2 DIABETES MELLITUS WITHOUT COMPLICATION, UNSPECIFIED WHETHER LONG TERM INSULIN USE (H): Primary | ICD-10-CM

## 2022-03-24 DIAGNOSIS — Z00.00 HEALTHCARE MAINTENANCE: ICD-10-CM

## 2022-03-24 DIAGNOSIS — Z87.891 PERSONAL HISTORY OF TOBACCO USE: ICD-10-CM

## 2022-03-24 LAB
CHOLEST SERPL-MCNC: 93 MG/DL
FASTING STATUS PATIENT QL REPORTED: NO
HBA1C MFR BLD: 5.6 % (ref 0–5.6)
HDLC SERPL-MCNC: 36 MG/DL
LDLC SERPL CALC-MCNC: 40 MG/DL
NONHDLC SERPL-MCNC: 57 MG/DL
TRIGL SERPL-MCNC: 85 MG/DL

## 2022-03-24 PROCEDURE — 83036 HEMOGLOBIN GLYCOSYLATED A1C: CPT | Performed by: STUDENT IN AN ORGANIZED HEALTH CARE EDUCATION/TRAINING PROGRAM

## 2022-03-24 PROCEDURE — 80061 LIPID PANEL: CPT | Performed by: STUDENT IN AN ORGANIZED HEALTH CARE EDUCATION/TRAINING PROGRAM

## 2022-03-24 PROCEDURE — 36416 COLLJ CAPILLARY BLOOD SPEC: CPT | Performed by: STUDENT IN AN ORGANIZED HEALTH CARE EDUCATION/TRAINING PROGRAM

## 2022-03-24 PROCEDURE — 36415 COLL VENOUS BLD VENIPUNCTURE: CPT | Performed by: STUDENT IN AN ORGANIZED HEALTH CARE EDUCATION/TRAINING PROGRAM

## 2022-03-24 PROCEDURE — 99214 OFFICE O/P EST MOD 30 MIN: CPT | Mod: GC | Performed by: STUDENT IN AN ORGANIZED HEALTH CARE EDUCATION/TRAINING PROGRAM

## 2022-03-24 RX ORDER — ATORVASTATIN CALCIUM 40 MG/1
40 TABLET, FILM COATED ORAL DAILY
Qty: 90 TABLET | Refills: 3 | Status: SHIPPED | OUTPATIENT
Start: 2022-03-24 | End: 2022-05-03

## 2022-03-24 RX ORDER — ATORVASTATIN CALCIUM 40 MG/1
TABLET, FILM COATED ORAL
COMMUNITY
Start: 2022-02-03 | End: 2022-03-24

## 2022-03-24 RX ORDER — LISINOPRIL 2.5 MG/1
2.5 TABLET ORAL DAILY
Qty: 90 TABLET | Refills: 3 | Status: SHIPPED | OUTPATIENT
Start: 2022-03-24 | End: 2023-01-17

## 2022-03-24 RX ORDER — GLIPIZIDE 5 MG/1
5 TABLET, FILM COATED, EXTENDED RELEASE ORAL DAILY
Qty: 90 TABLET | Refills: 3 | Status: SHIPPED | OUTPATIENT
Start: 2022-03-24 | End: 2022-05-03

## 2022-03-24 RX ORDER — LEVOTHYROXINE SODIUM 100 UG/1
100 TABLET ORAL
Qty: 60 TABLET | Refills: 1 | Status: SHIPPED | OUTPATIENT
Start: 2022-03-24 | End: 2022-07-29

## 2022-03-24 NOTE — PATIENT INSTRUCTIONS
Patient Education   Here is the plan from today's visit    1. Type 2 diabetes mellitus without complication, unspecified whether long term insulin use (H)  Follow up in 3 months  - Hemoglobin A1c; Future  - Hemoglobin A1c  - glipiZIDE (GLUCOTROL XL) 5 MG 24 hr tablet; Take 1 tablet (5 mg) by mouth daily  Dispense: 90 tablet; Refill: 3  - insulin glargine (LANTUS PEN) 100 UNIT/ML pen; Inject 52 Units Subcutaneous At Bedtime  Dispense: 3 mL; Refill: 3  - lisinopril (ZESTRIL) 2.5 MG tablet; Take 1 tablet (2.5 mg) by mouth daily  Dispense: 90 tablet; Refill: 3  - atorvastatin (LIPITOR) 40 MG tablet; Take 1 tablet (40 mg) by mouth daily  Dispense: 90 tablet; Refill: 3  - insulin pen needle 30G X 5 MM; Use pen needles daily or as directed.  Dispense: 100 each; Refill: 0  - Lipid panel reflex to direct LDL Non-fasting; Future    2. Personal history of tobacco use  Here if you need help quitting    3. Hypothyroidism, unspecified type  Called the pharmacy for you  - levothyroxine (SYNTHROID/LEVOTHROID) 100 MCG tablet; Take 1 tablet (100 mcg) by mouth every morning (before breakfast)  Dispense: 60 tablet; Refill: 1    4. Healthcare maintenance  - aspirin (ASPIRIN) 81 MG EC tablet; Take 1 tablet (81 mg) by mouth daily  Dispense: 90 tablet; Refill: 6    Please call or return to clinic if your symptoms don't go away.    Follow up plan  Return in about 3 months (around 6/24/2022) for DM Follow Up.    Thank you for coming to [facility] today.  Lab Testing:  **If you had lab testing today and your results are reassuring or normal they will be mailed to you or sent through Numerate within 7 days.   **If the lab tests need quick action we will call you with the results.  **If you are having labs done on a different day, please call [phone] to schedule at [facility] or [phone] for other [facility] locations. Labs do not offer walk-in appointments.  The phone number we will call with results is #  [phone] (home) none (work). If this is not the best number please call our clinic and change the number.  Medication Refills:  If you need any refills please call your pharmacy and they will contact us.   If you need to  your refill at a new pharmacy, please contact the new pharmacy directly. The new pharmacy will help you get your medications transferred faster.   Scheduling:  If you have any concerns about today's visit or wish to schedule another appointment please call our office during normal business hours [phone] (8-5:00 M-F)  If a referral was made to an [facility] specialty provider and you do not get a call from central scheduling, please refer to directions on your visit summary or call our office during normal business hours for assistance.   If a Mammogram was ordered for you at the Breast Center call [phone] to schedule or change your appointment.  If you had an XRay/CT/Ultrasound/MRI ordered the number is [phone] to schedule or change your radiology appointment.   [facility] has limited ultrasound appointments available on Wednesdays, if you would like your ultrasound at [facility], please call [phone] to schedule.   Medical Concerns:  If you have urgent medical concerns please call [phone] at any time of the day.    Preeti Lin MD

## 2022-03-24 NOTE — LETTER
March 25, 2022      Feliciano Ross  [address] [facility] [address]        Dear Feliciano,    Thank you for getting your care at [facility]. Please see below for your test results. As we discussed your diabetes average level (A1C) is low and concerning that you sugars may get too low. Decrease your insulin to 45 U daily or nightly, whichever you prefer. Follow up in 1 month to see how daily sugars are doing.     Resulted Orders   Hemoglobin A1c   Result Value Ref Range    Hemoglobin A1C 5.6 0.0 - 5.6 %      Comment:      Normal <5.7%   Prediabetes 5.7-6.4%    Diabetes 6.5% or higher     Note: Adopted from ADA consensus guidelines.   Lipid panel reflex to direct LDL Non-fasting   Result Value Ref Range    Cholesterol 93 <200 mg/dL    Triglycerides 85 <150 mg/dL    Direct Measure HDL 36 (L) >=40 mg/dL    LDL Cholesterol Calculated 40 <=100 mg/dL    Non HDL Cholesterol 57 <130 mg/dL    Patient Fasting > 8hrs? No     Narrative    Cholesterol  Desirable:  <200 mg/dL    Triglycerides  Normal:  Less than 150 mg/dL  Borderline High:  150-199 mg/dL  High:  200-499 mg/dL  Very High:  Greater than or equal to 500 mg/dL    Direct Measure HDL  Female:  Greater than or equal to 50 mg/dL   Male:  Greater than or equal to 40 mg/dL    LDL Cholesterol  Desirable:  <100mg/dL  Above Desirable:  100-129 mg/dL   Borderline High:  130-159 mg/dL   High:  160-189 mg/dL   Very High:  >= 190 mg/dL    Non HDL Cholesterol  Desirable:  130 mg/dL  Above Desirable:  130-159 mg/dL  Borderline High:  160-189 mg/dL  High:  190-219 mg/dL  Very High:  Greater than or equal to 220 mg/dL       If you have any concerns about these results please call and leave a message for me or send a MyChart message to the clinic.    Sincerely,    Preeti Lin MD

## 2022-03-24 NOTE — PROGRESS NOTES
Preceptor Attestation:  Patient seen and evaluated in person. I discussed the patient with the resident. I have verified the content of the note, which accurately reflects my assessment of the patient and the plan of care.   Supervising Physician:  Shereen Brooks DO

## 2022-03-24 NOTE — PROGRESS NOTES
Assessment & Plan     Type 2 diabetes mellitus without complication, unspecified whether long term insulin use (H)  Pt has been on metformin at full dose for 6 months now and last A1C 6 and repeat today 5.6. Pt in the past has not wanted to stop his glipizide or adjust lantus. Discussed concern for low blood sugars if we continue all three medications, with glipizide and insulin most likely to due this. However, A1C did not come back until patient was back in lab for full blood test, but pt agreed to decreased Lantus from 52 U nightly to 45 U nightly. Would like to try and get him of glipizide at next visit if willing. Discussed follow up in a month instead of 3 month given results, will route to care coordination to help schedule.  - Hemoglobin A1c; Future  - Hemoglobin A1c  - glipiZIDE (GLUCOTROL XL) 5 MG 24 hr tablet; Take 1 tablet (5 mg) by mouth daily  - lisinopril (ZESTRIL) 2.5 MG tablet; Take 1 tablet (2.5 mg) by mouth daily  - atorvastatin (LIPITOR) 40 MG tablet; Take 1 tablet (40 mg) by mouth daily  - insulin pen needle 30G X 5 MM; Use pen needles daily or as directed.  - Lipid panel reflex to direct LDL Non-fasting; Future  - insulin glargine (LANTUS PEN) 100 UNIT/ML pen; Inject 45 Units Subcutaneous At Bedtime  - Lipid panel reflex to direct LDL Non-fasting    Personal history of tobacco use  Encouter for tobacco use cessation counseling  Pt restarted smoking, but plans to quit on own. Pt does not wish to have help with this or try nicotine or Chantix at this time. Offered support materials, declined.     Hypothyroidism, unspecified type  TSH wnl, will refill. Follow up labs in a year.  - levothyroxine (SYNTHROID/LEVOTHROID) 100 MCG tablet; Take 1 tablet (100 mcg) by mouth every morning (before breakfast)    Healthcare maintenance  Per pt he takes this OTC  - aspirin (ASPIRIN) 81 MG EC tablet; Take 1 tablet (81 mg) by mouth daily      Return in about 4 weeks (around 4/21/2022) for DM Follow  "Up.    Preeti Lin MD  [facility] DEXTER Wiggins is a 71 year old who presents for the following health issues    HPI     Type 2 diabetes   Between , no sugars below 70. No confusion. Never been shaky or sweaty.  Still on metformin and glipizide 52 units at night, pt still not wanting to change around medication. No cP. No SOB. No new  numbness or tingling. No sore muscles since starting statin.      Filled out form for medication and called his pharmacy    Review of Systems   Constitutional, HEENT, cardiovascular, pulmonary, gi and gu systems are negative, except as otherwise noted.      Objective    /82   Pulse 90   Temp 97.7  F (36.5  C) (Oral)   Ht 1.727 m (5' 8\")   SpO2 97%   BMI 38.04 kg/m    Body mass index is 38.04 kg/m .  Physical Exam   Exam:   General: Alert and oriented, in no acute distress.  Skin: Warm and dry, no abnormalities noted.  Eyes: Extra-ocular muscles intact, pupils equal and reactive.  ENT: Speech intact, nasal passages open, no hearing impairment noted.  CV: No cyanosis or pallor, warm and well perfused.  Respiratory: No respiratory distress, no accessory muscle use.  Neuro: Gait and station normal, comprehension intact. Gross and fine motor skills intact.   Psychiatric: Mood and affect appear baseline  Extremities: Warm, able to move all four extremities at will.    Hemoglobin A1C POCT   Date Value Ref Range Status   11/10/2016 5.7 4.1 - 5.7 % Final     Hemoglobin A1C   Date Value Ref Range Status   03/24/2022 5.6 0.0 - 5.6 % Final     Comment:     Normal <5.7%   Prediabetes 5.7-6.4%    Diabetes 6.5% or higher     Note: Adopted from ADA consensus guidelines.             Component Ref Range & Units 2 mo ago   (1/14/22) 3 mo ago   (12/14/21) 6 mo ago   (9/7/21) 5 yr ago   (3/1/17) 5 yr ago   (1/6/17) 5 yr ago   (11/10/16) 5 yr ago   (9/22/16)    TSH 0.40 - 4.00 mU/L 1.82  4.38 High   4.89 High   3.58  15.40 High   10.50 High   1.45  "   [facility]  [facility]

## 2022-03-24 NOTE — Clinical Note
No glover, Lety! I would like to have him scheduled to see me next month since his sugars are so low. I told him after the visit, but he has trouble remembering sometimes. Thank you!    Preeti Lin MD

## 2022-03-31 NOTE — MR AVS SNAPSHOT
After Visit Summary   3/1/2017    Feliciano Ross    MRN: [id number]           Patient Information     Date Of Birth          1950        Visit Information        Provider Department      3/1/2017 1:20 PM Marika Padron MD [facility]        Today's Diagnoses     Congenital hypothyroidism without goiter    -  1    Hypernatremia          Care Instructions    Here is the plan from today's visit    1. Hypothyroidism without goiter    - TSH to obtain  - T4 free to obtain    2. Hypernatremia  - Basic Metabolic Panel ([facility])    We will call you with results.    Please call or return to clinic if your symptoms don't go away.    Follow up plan: pending thyroid results.     Thank you for coming to [facility] today.  Lab Testing:  **If you had lab testing today and your results are reassuring or normal they will be mailed to you or sent through [employer] within 7 days.   **If the lab tests need quick action we will call you with the results.  The phone number we will call with results is # [phone] (home) none (work). If this is not the best number please call our clinic and change the number.  Medication Refills:  If you need any refills please call your pharmacy and they will contact us.   If you need to  your refill at a new pharmacy, please contact the new pharmacy directly. The new pharmacy will help you get your medications transferred faster.   Scheduling:  If you have any concerns about today's visit or wish to schedule another appointment please call our office during normal business hours [phone] (8-5:00 M-F)  If a referral was made to a [facility] and you don't get a call from central scheduling please call [phone].  If a Mammogram was ordered for you at [facility] call [phone] to schedule or change your appointment.  If you had an XRay/CT/Ultrasound/MRI ordered the number is [phone] to schedule or change  Pt recently seen by Dr. Ramirez. She is starting IVF cycle at end of April through fertility clinic in [place]. She is requesting for Dr. Herrmann to monitor her labs and ultrasounds because local fertility clinics are too costly.     Dr. Herrmann, what is your stance on this?      "your radiology appointment.   Medical Concerns:  If you have urgent medical concerns please call [phone] at any time of the day.          Follow-ups after your visit        Who to contact     Please call your clinic at [phone] to:    Ask questions about your health    Make or cancel appointments    Discuss your medicines    Learn about your test results    Speak to your doctor   If you have compliments or concerns about an experience at your clinic, or if you wish to file a complaint, please contact [facility] Patient Relations at [phone] or email us at [email or web address]         Additional Information About Your Visit        [facility] Information     Slacker is an electronic gateway that provides easy, online access to your medical records. With Slacker, you can request a clinic appointment, read your test results, renew a prescription or communicate with your care team.     To sign up for Slacker visit the website at [email or web address]   You will be asked to enter the access code listed below, as well as some personal information. Please follow the directions to create your username and password.     Your access code is: [email or web address]  Expires: 2017  7:25 AM     Your access code will  in 90 days. If you need help or a new code, please contact your [facility] or call [phone] for assistance.        Care EveryWhere ID     This is your Care EveryWhere ID. This could be used by other organizations to access your [place] medical records  [id number]        Your Vitals Were     Pulse Temperature Respirations Height Pulse Oximetry BMI (Body Mass Index)    76 97.7  F (36.5  C) (Oral) 20 5' 9\" (175.3 cm) 97% 38.99 kg/m2       Blood Pressure from Last 3 Encounters:   17 120/82   17 108/78   17 117/75    Weight from Last 3 Encounters:   17 264 lb (119.7 kg)   17 260 lb (117.9 kg) "   02/08/17 260 lb (117.9 kg)              We Performed the Following     Basic Metabolic Panel ([facility])     T4 free     TSH        Primary Care Provider Office Phone # Fax #    Marika Padron [phone]       [facility] [address]   [place] [address]        Thank you!     Thank you for choosing [facility]  for your care. Our goal is always to provide you with excellent care. Hearing back from our patients is one way we can continue to improve our services. Please take a few minutes to complete the written survey that you may receive in the mail after your visit with us. Thank you!             Your Updated Medication List - Protect others around you: Learn how to safely use, store and throw away your medicines at www.disposemymeds.org.          This list is accurate as of: 3/1/17  1:34 PM.  Always use your most recent med list.                   Brand Name Dispense Instructions for use    * ARIPiprazole 10 MG tablet    ABILIFY    60 tablet    Take 1 tablet (10 mg) by mouth 2 times daily as needed       * ARIPiprazole 400 MG extended release injection    ABILIFY MAINTENA    2 mL    Inject 2 mLs (400 mg) into the muscle every 28 days       aspirin 81 MG EC tablet    aspirin    90 tablet    Take 1 tablet (81 mg) by mouth daily       ibuprofen 400 MG tablet    ADVIL/MOTRIN    30 tablet    Take 1 tablet (400 mg) by mouth every 4 hours as needed for pain       levothyroxine 75 MCG tablet    SYNTHROID/LEVOTHROID    30 tablet    Take 1 tablet (75 mcg) by mouth every morning (before breakfast)       mineral oil-hydrophilic petrolatum     420 g    Apply 1-2 x daily to right leg wound       omeprazole 20 MG tablet     90 tablet    Take 1 tablet (20 mg) by mouth daily Take 30-60 minutes before a meal.       QUEtiapine 50 MG Tb24 24 hr tablet    SEROquel XR     Take 100 mg by mouth       traZODone 50 MG tablet    DESYREL     Take 50 mg by mouth nightly as needed  for sleep (May repeat x 1 if needed)       * Notice:  This list has 2 medication(s) that are the same as other medications prescribed for you. Read the directions carefully, and ask your doctor or other care provider to review them with you.

## 2022-05-03 ENCOUNTER — OFFICE VISIT (OUTPATIENT)
Dept: FAMILY MEDICINE | Facility: CLINIC | Age: 72
End: 2022-05-03
Payer: COMMERCIAL

## 2022-05-03 VITALS
WEIGHT: 250 LBS | TEMPERATURE: 98.2 F | RESPIRATION RATE: 16 BRPM | OXYGEN SATURATION: 96 % | BODY MASS INDEX: 37.89 KG/M2 | HEART RATE: 91 BPM | DIASTOLIC BLOOD PRESSURE: 79 MMHG | SYSTOLIC BLOOD PRESSURE: 109 MMHG | HEIGHT: 68 IN

## 2022-05-03 DIAGNOSIS — Z23 HIGH PRIORITY FOR 2019-NCOV VACCINE: ICD-10-CM

## 2022-05-03 DIAGNOSIS — Z71.6 ENCOUNTER FOR TOBACCO USE CESSATION COUNSELING: ICD-10-CM

## 2022-05-03 DIAGNOSIS — E11.9 TYPE 2 DIABETES MELLITUS WITHOUT COMPLICATION, UNSPECIFIED WHETHER LONG TERM INSULIN USE (H): Primary | ICD-10-CM

## 2022-05-03 PROCEDURE — 0054A COVID-19,PF,PFIZER (12+ YRS): CPT | Performed by: STUDENT IN AN ORGANIZED HEALTH CARE EDUCATION/TRAINING PROGRAM

## 2022-05-03 PROCEDURE — 91305 COVID-19,PF,PFIZER (12+ YRS): CPT | Performed by: STUDENT IN AN ORGANIZED HEALTH CARE EDUCATION/TRAINING PROGRAM

## 2022-05-03 PROCEDURE — 99214 OFFICE O/P EST MOD 30 MIN: CPT | Mod: 25 | Performed by: STUDENT IN AN ORGANIZED HEALTH CARE EDUCATION/TRAINING PROGRAM

## 2022-05-03 RX ORDER — METFORMIN HCL 500 MG
1000 TABLET, EXTENDED RELEASE 24 HR ORAL 2 TIMES DAILY WITH MEALS
Qty: 180 TABLET | Refills: 3 | Status: SHIPPED | OUTPATIENT
Start: 2022-05-03 | End: 2022-11-22

## 2022-05-03 RX ORDER — ATORVASTATIN CALCIUM 40 MG/1
40 TABLET, FILM COATED ORAL DAILY
Qty: 30 TABLET | Refills: 11 | Status: SHIPPED | OUTPATIENT
Start: 2022-05-03 | End: 2023-05-08

## 2022-05-03 NOTE — PROGRESS NOTES
Assessment & Plan     Type 2 diabetes mellitus without complication, unspecified whether long term insulin use (H)  Pt with A1C below 6 with risk of getting hypoglycemic with insulin, glipizide, and now full dose metformin. Last visit decrease insulin from 52 U to 45 U and still ranging 100's. Discontinued Glipizide  - insulin pen needle 30G X 5 MM; Use pen needles daily or as directed.  - metFORMIN (GLUCOPHAGE-XR) 500 MG 24 hr tablet; Take 2 tablets (1,000 mg) by mouth 2 times daily (with meals)  - atorvastatin (LIPITOR) 40 MG tablet; Take 1 tablet (40 mg) by mouth daily    Encounter for tobacco use cessation counseling  Pt still smoking, discussing screening for lung cancer, as agreed before, but now declines and does not want scheduled and not ready to quit.     High priority for 2019-nCoV vaccine  - COVID-19,PF,PFIZER (12+ Yrs GRAY LABEL)    Return in about 4 weeks (around 5/31/2022) for DM Follow Up.    Preeti Lin MD  [facility] DEXTER Wiggins is a 71 year old who presents for the following health issues     HPI     DM 2 Follow Up  At last visit decreased Insulin from 52 U to 45 U nightly due to A1C being 5.6 and dropping since starting Metformin. Since then he's sugars have been well, this morning it was 100. Ranging between 100 and 150, haven't been higher. Can tell when well, no sugars less than 70, can have leg shaking sometimes, but that's when sugar is at 100. No n/t in feet. No cp or trouble breathing. Got a treadmill at home, using it every day, at least 15 min at a lime, feels his heart rate is up with it. Weight down to 250 lbs     Having one to two cigarettes and doesn't want to quick. Doesn't want a CT scan on lungs now.     Review of Systems   Constitutional, HEENT, cardiovascular, pulmonary, gi and gu systems are negative, except as otherwise noted.      Objective    There were no vitals taken for this visit.  There is no height or weight on file to calculate  BMI.  Physical Exam   Exam:   General: Alert and oriented, in no acute distress.  Skin: Warm and dry, no abnormalities noted.  Eyes: Extra-ocular muscles intact, pupils equal and reactive.  ENT: Speech intact, nasal passages open, no hearing impairment noted.  CV: No cyanosis or pallor, warm and well perfused.  Respiratory: No respiratory distress, no accessory muscle use.  Neuro: Gait and station normal, comprehension intact. Gross and fine motor skills intact.   Psychiatric: Mood and affect appear normal.   Extremities: Warm, able to move all four extremities at will.    Office Visit on 03/24/2022   Component Date Value Ref Range Status     Hemoglobin A1C 03/24/2022 5.6  0.0 - 5.6 % Final    Normal <5.7%   Prediabetes 5.7-6.4%    Diabetes 6.5% or higher     Note: Adopted from ADA consensus guidelines.     Cholesterol 03/24/2022 93  <200 mg/dL Final     Triglycerides 03/24/2022 85  <150 mg/dL Final     Direct Measure HDL 03/24/2022 36 (A) >=40 mg/dL Final     LDL Cholesterol Calculated 03/24/2022 40  <=100 mg/dL Final     Non HDL Cholesterol 03/24/2022 57  <130 mg/dL Final     Patient Fasting > 8hrs? 03/24/2022 No   Final

## 2022-05-03 NOTE — PATIENT INSTRUCTIONS
Patient Education   Here is the plan from today's visit    1. Type 2 diabetes mellitus without complication, unspecified whether long term insulin use (H)  Follow up in 1 month  - insulin pen needle 30G X 5 MM; Use pen needles daily or as directed.  Dispense: 100 each; Refill: 11  - metFORMIN (GLUCOPHAGE-XR) 500 MG 24 hr tablet; Take 2 tablets (1,000 mg) by mouth 2 times daily (with meals)  Dispense: 180 tablet; Refill: 3  - atorvastatin (LIPITOR) 40 MG tablet; Take 1 tablet (40 mg) by mouth daily  Dispense: 30 tablet; Refill: 11      Please call or return to clinic if your symptoms don't go away.    Follow up plan  Return in about 4 weeks (around 5/31/2022) for DM Follow Up.    Thank you for coming to [facility] today.  Lab Testing:  **If you had lab testing today and your results are reassuring or normal they will be mailed to you or sent through [employer] within 7 days.   **If the lab tests need quick action we will call you with the results.  **If you are having labs done on a different day, please call [phone] to schedule at [facility] or [phone] for other [facility] locations. Labs do not offer walk-in appointments.  The phone number we will call with results is # [phone] (home) none (work). If this is not the best number please call our clinic and change the number.  Medication Refills:  If you need any refills please call your pharmacy and they will contact us.   If you need to  your refill at a new pharmacy, please contact the new pharmacy directly. The new pharmacy will help you get your medications transferred faster.   Scheduling:  If you have any concerns about today's visit or wish to schedule another appointment please call our office during normal business hours [phone] (8-5:00 M-F)  If a referral was made to an [facility] specialty provider and you do not get a call from central scheduling, please refer to directions on your visit summary or  call our office during normal business hours for assistance.   If a M[facility]ogram was [facility] for you at [facility] call [phone] to schedule or change your appointment.  If you had an XRay/CT/Ultrasound/MRI ordered the number is [phone] to schedule or change your radiology appointment.   [facility] has limited ultrasound appointments available on Wednesdays, if you would like your ultrasound at [facility], please call [phone] to schedule.   Medical Concerns:  If you have urgent medical concerns please call [phone] at any time of the day.    Preeti Lin MD

## 2022-05-10 ENCOUNTER — DOCUMENTATION ONLY (OUTPATIENT)
Dept: FAMILY MEDICINE | Facility: CLINIC | Age: 72
End: 2022-05-10
Payer: COMMERCIAL

## 2022-05-31 ENCOUNTER — OFFICE VISIT (OUTPATIENT)
Dept: FAMILY MEDICINE | Facility: CLINIC | Age: 72
End: 2022-05-31
Payer: COMMERCIAL

## 2022-05-31 VITALS
OXYGEN SATURATION: 97 % | TEMPERATURE: 98.1 F | SYSTOLIC BLOOD PRESSURE: 107 MMHG | RESPIRATION RATE: 16 BRPM | WEIGHT: 250 LBS | HEIGHT: 68 IN | HEART RATE: 95 BPM | DIASTOLIC BLOOD PRESSURE: 79 MMHG | BODY MASS INDEX: 37.89 KG/M2

## 2022-05-31 DIAGNOSIS — E66.01 MORBID OBESITY (H): ICD-10-CM

## 2022-05-31 DIAGNOSIS — Z86.19 HX OF HEPATITIS C: ICD-10-CM

## 2022-05-31 DIAGNOSIS — E11.9 TYPE 2 DIABETES MELLITUS WITHOUT COMPLICATION, UNSPECIFIED WHETHER LONG TERM INSULIN USE (H): Primary | ICD-10-CM

## 2022-05-31 DIAGNOSIS — Z12.11 ENCOUNTER FOR SCREENING FOR MALIGNANT NEOPLASM OF COLON: ICD-10-CM

## 2022-05-31 DIAGNOSIS — K73.0: ICD-10-CM

## 2022-05-31 DIAGNOSIS — F20.0 PARANOID SCHIZOPHRENIA (H): ICD-10-CM

## 2022-05-31 DIAGNOSIS — E03.8 OTHER SPECIFIED HYPOTHYROIDISM: ICD-10-CM

## 2022-05-31 LAB
ALBUMIN SERPL-MCNC: 3.7 G/DL (ref 3.4–5)
ALP SERPL-CCNC: 165 U/L (ref 40–150)
ALT SERPL W P-5'-P-CCNC: 104 U/L (ref 0–70)
ANION GAP SERPL CALCULATED.3IONS-SCNC: 6 MMOL/L (ref 3–14)
AST SERPL W P-5'-P-CCNC: 118 U/L (ref 0–45)
BASOPHILS # BLD AUTO: 0 10E3/UL (ref 0–0.2)
BASOPHILS NFR BLD AUTO: 1 %
BILIRUB SERPL-MCNC: 1.8 MG/DL (ref 0.2–1.3)
BUN SERPL-MCNC: 20 MG/DL (ref 7–30)
CALCIUM SERPL-MCNC: 9.6 MG/DL (ref 8.5–10.1)
CHLORIDE BLD-SCNC: 106 MMOL/L (ref 94–109)
CHOLEST SERPL-MCNC: 102 MG/DL
CO2 SERPL-SCNC: 27 MMOL/L (ref 20–32)
CREAT SERPL-MCNC: 1.08 MG/DL (ref 0.66–1.25)
EOSINOPHIL # BLD AUTO: 0.3 10E3/UL (ref 0–0.7)
EOSINOPHIL NFR BLD AUTO: 4 %
ERYTHROCYTE [DISTWIDTH] IN BLOOD BY AUTOMATED COUNT: 13 % (ref 10–15)
FASTING STATUS PATIENT QL REPORTED: ABNORMAL
GFR SERPL CREATININE-BSD FRML MDRD: 74 ML/MIN/1.73M2
GLUCOSE BLD-MCNC: 149 MG/DL (ref 70–99)
HBA1C MFR BLD: 5.7 % (ref 0–5.6)
HCT VFR BLD AUTO: 40.7 % (ref 40–53)
HDLC SERPL-MCNC: 34 MG/DL
HGB BLD-MCNC: 13.5 G/DL (ref 13.3–17.7)
IMM GRANULOCYTES # BLD: 0 10E3/UL
IMM GRANULOCYTES NFR BLD: 0 %
LDLC SERPL CALC-MCNC: 50 MG/DL
LYMPHOCYTES # BLD AUTO: 2.7 10E3/UL (ref 0.8–5.3)
LYMPHOCYTES NFR BLD AUTO: 36 %
MCH RBC QN AUTO: 31 PG (ref 26.5–33)
MCHC RBC AUTO-ENTMCNC: 33.2 G/DL (ref 31.5–36.5)
MCV RBC AUTO: 94 FL (ref 78–100)
MONOCYTES # BLD AUTO: 0.8 10E3/UL (ref 0–1.3)
MONOCYTES NFR BLD AUTO: 11 %
NEUTROPHILS # BLD AUTO: 3.6 10E3/UL (ref 1.6–8.3)
NEUTROPHILS NFR BLD AUTO: 48 %
NONHDLC SERPL-MCNC: 68 MG/DL
PLATELET # BLD AUTO: 234 10E3/UL (ref 150–450)
POTASSIUM BLD-SCNC: 5 MMOL/L (ref 3.4–5.3)
PROLACTIN SERPL-MCNC: 46 UG/L (ref 2–18)
PROT SERPL-MCNC: 8.1 G/DL (ref 6.8–8.8)
RBC # BLD AUTO: 4.35 10E6/UL (ref 4.4–5.9)
SODIUM SERPL-SCNC: 139 MMOL/L (ref 133–144)
TRIGL SERPL-MCNC: 89 MG/DL
TSH SERPL DL<=0.005 MIU/L-ACNC: 1.89 MU/L (ref 0.4–4)
WBC # BLD AUTO: 7.4 10E3/UL (ref 4–11)

## 2022-05-31 PROCEDURE — 80061 LIPID PANEL: CPT | Performed by: STUDENT IN AN ORGANIZED HEALTH CARE EDUCATION/TRAINING PROGRAM

## 2022-05-31 PROCEDURE — 36415 COLL VENOUS BLD VENIPUNCTURE: CPT | Performed by: STUDENT IN AN ORGANIZED HEALTH CARE EDUCATION/TRAINING PROGRAM

## 2022-05-31 PROCEDURE — 82977 ASSAY OF GGT: CPT | Performed by: STUDENT IN AN ORGANIZED HEALTH CARE EDUCATION/TRAINING PROGRAM

## 2022-05-31 PROCEDURE — 99214 OFFICE O/P EST MOD 30 MIN: CPT | Mod: GC | Performed by: STUDENT IN AN ORGANIZED HEALTH CARE EDUCATION/TRAINING PROGRAM

## 2022-05-31 PROCEDURE — 83036 HEMOGLOBIN GLYCOSYLATED A1C: CPT | Performed by: STUDENT IN AN ORGANIZED HEALTH CARE EDUCATION/TRAINING PROGRAM

## 2022-05-31 PROCEDURE — 84146 ASSAY OF PROLACTIN: CPT | Performed by: STUDENT IN AN ORGANIZED HEALTH CARE EDUCATION/TRAINING PROGRAM

## 2022-05-31 PROCEDURE — 80050 GENERAL HEALTH PANEL: CPT | Performed by: STUDENT IN AN ORGANIZED HEALTH CARE EDUCATION/TRAINING PROGRAM

## 2022-05-31 NOTE — PROGRESS NOTES
Assessment & Plan     Type 2 diabetes mellitus without complication, unspecified whether long term insulin use (H)  Pt has been doing well with sugars still since stopping glipizide and feels good. Psychiatrist would like an A1C, will be about a month early, but okay to grab. Keeping Lantus at 45 Units Daily and Metformin 1000 mg BID.  - Hemoglobin A1c; Future  - Hemoglobin A1c    Encounter for screening for malignant neoplasm of colon  - Adult Gastro Ref - Procedure Only; Future    Paranoid schizophrenia (H)  Pt has been seeing a Psychiatrist at [facility] for continued treatment and monthly Invega shots; pt brought in Psych's lab request. Will ordered and fax to them.   - Comprehensive metabolic panel; Future  - CBC with platelets differential; Future  - Hemoglobin A1c; Future  - Lipid panel; Future  - Prolactin; Future  - TSH with free T4 reflex; Future  - Comprehensive metabolic panel  - CBC with platelets differential  - Hemoglobin A1c  - Lipid panel  - Prolactin  - TSH with free T4 reflex    Other specified hypothyroidism  - TSH with free T4 reflex; Future  - TSH with free T4 reflex    Morbid obesity (H)  - Lipid panel; Future  - Lipid panel    Hx of hepatitis C  Chronic persistent hepatitis, not elsewhere classified (H)   Lab results of CMP came back with elevated transaminases; this appears to be chronic (at least last 6 years). Recently recheck for Hep C as has hx of this and recheck showed pt cleared the virus on his own; however he still could have some underlying damage. Not in thousands so not likely an infection with another Hepatitis virus. Bili is also up a little, again could be from chronic liver damage or possible blockage, but no abdominal pain. Will get GGT as ALP elevated too; if positive will get US. Can assess for new viral infections at next visit. Recommend pt contact Psychiatrist in case Invega is also a cause.   - GGT    Return in about 4 weeks (around 6/28/2022) for DM Follow  "Up.    Preeti Lin MD  [facility] DEXTER Wiggins is a 72 year old who presents for the following health issues    HPI     Mental health follow Up  Needs labs for psychiatrist due to being on Invega; doing well; no issues or hallucinations, not paranoid    DM 2 follow up  Sugars are 100's, no lows, none below 70 and nothing over 150.     Colon Cancer Screening  Would like to get colonoscopy to get $50 reimbursement by [facility]    Paranoid Schizophrenia  Pt has been stable on Invega shots, see's a Psychiatrist at [facility], brought in lab orders that Psychiatrist wants to medication management.    Review of Systems   Constitutional, HEENT, cardiovascular, pulmonary, gi and gu systems are negative, except as otherwise noted.      Objective    /79   Pulse 95   Temp 98.1  F (36.7  C) (Oral)   Resp 16   Ht 1.727 m (5' 7.99\")   Wt 113.4 kg (250 lb)   SpO2 97%   BMI 38.02 kg/m    Body mass index is 38.02 kg/m .  Physical Exam   Exam:  General: Alert and oriented, in no acute distress.  Skin: Warm and dry, no abnormalities noted.  Eyes: Extra-ocular muscles intact, pupils equal and reactive.  ENT: Speech intact, nasal passages open, no hearing impairment noted.  CV: No cyanosis or pallor, warm and well perfused.  Respiratory: No respiratory distress, no accessory muscle use.  Neuro: Gait and station normal, comprehension intact. Gross and fine motor skills intact.   Psychiatric: Mood and affect appear normal.   Extremities: Warm, able to move all four extremities at will.    Results for orders placed or performed in visit on 05/31/22   Comprehensive metabolic panel     Status: Abnormal   Result Value Ref Range    Sodium 139 133 - 144 mmol/L    Potassium 5.0 3.4 - 5.3 mmol/L    Chloride 106 94 - 109 mmol/L    Carbon Dioxide (CO2) 27 20 - 32 mmol/L    Anion Gap 6 3 - 14 mmol/L    Urea Nitrogen 20 7 - 30 mg/dL    Creatinine 1.08 0.66 - 1.25 mg/dL    Calcium 9.6 8.5 - 10.1 mg/dL    " Glucose 149 (H) 70 - 99 mg/dL    Alkaline Phosphatase 165 (H) 40 - 150 U/L     (H) 0 - 45 U/L     (H) 0 - 70 U/L    Protein Total 8.1 6.8 - 8.8 g/dL    Albumin 3.7 3.4 - 5.0 g/dL    Bilirubin Total 1.8 (H) 0.2 - 1.3 mg/dL    GFR Estimate 74 >60 mL/min/1.73m2   Hemoglobin A1c     Status: Abnormal   Result Value Ref Range    Hemoglobin A1C 5.7 (H) 0.0 - 5.6 %   Lipid panel     Status: Abnormal   Result Value Ref Range    Cholesterol 102 <200 mg/dL    Triglycerides 89 <150 mg/dL    Direct Measure HDL 34 (L) >=40 mg/dL    LDL Cholesterol Calculated 50 <=100 mg/dL    Non HDL Cholesterol 68 <130 mg/dL    Patient Fasting > 8hrs? Unknown     Narrative    Cholesterol  Desirable:  <200 mg/dL    Triglycerides  Normal:  Less than 150 mg/dL  Borderline High:  150-199 mg/dL  High:  200-499 mg/dL  Very High:  Greater than or equal to 500 mg/dL    Direct Measure HDL  Female:  Greater than or equal to 50 mg/dL   Male:  Greater than or equal to 40 mg/dL    LDL Cholesterol  Desirable:  <100mg/dL  Above Desirable:  100-129 mg/dL   Borderline High:  130-159 mg/dL   High:  160-189 mg/dL   Very High:  >= 190 mg/dL    Non HDL Cholesterol  Desirable:  130 mg/dL  Above Desirable:  130-159 mg/dL  Borderline High:  160-189 mg/dL  High:  190-219 mg/dL  Very High:  Greater than or equal to 220 mg/dL   Prolactin     Status: Abnormal   Result Value Ref Range    Prolactin 46 (H) 2 - 18 ug/L   TSH with free T4 reflex     Status: Normal   Result Value Ref Range    TSH 1.89 0.40 - 4.00 mU/L   CBC with platelets and differential     Status: Abnormal   Result Value Ref Range    WBC Count 7.4 4.0 - 11.0 10e3/uL    RBC Count 4.35 (L) 4.40 - 5.90 10e6/uL    Hemoglobin 13.5 13.3 - 17.7 g/dL    Hematocrit 40.7 40.0 - 53.0 %    MCV 94 78 - 100 fL    MCH 31.0 26.5 - 33.0 pg    MCHC 33.2 31.5 - 36.5 g/dL    RDW 13.0 10.0 - 15.0 %    Platelet Count 234 150 - 450 10e3/uL    % Neutrophils 48 %    % Lymphocytes 36 %    % Monocytes 11 %    %  Eosinophils 4 %    % Basophils 1 %    % Immature Granulocytes 0 %    Absolute Neutrophils 3.6 1.6 - 8.3 10e3/uL    Absolute Lymphocytes 2.7 0.8 - 5.3 10e3/uL    Absolute Monocytes 0.8 0.0 - 1.3 10e3/uL    Absolute Eosinophils 0.3 0.0 - 0.7 10e3/uL    Absolute Basophils 0.0 0.0 - 0.2 10e3/uL    Absolute Immature Granulocytes 0.0 <=0.4 10e3/uL   GGT     Status: Abnormal   Result Value Ref Range     (H) 0 - 75 U/L   CBC with platelets differential     Status: Abnormal    Narrative    The following orders were created for panel order CBC with platelets differential.  Procedure                               Abnormality         Status                     ---------                               -----------         ------                     CBC with platelets and d...[[id number]]  Abnormal            Final result                 Please view results for these tests on the individual orders.

## 2022-05-31 NOTE — PATIENT INSTRUCTIONS
Patient Education   Here is the plan from today's visit    1. Type 2 diabetes mellitus without complication, unspecified whether long term insulin use (H)  - Hemoglobin A1c; Future    2. Encounter for screening for malignant neoplasm of colon  - Adult Gastro Ref - Procedure Only; Future    3. Paranoid schizophrenia (H)  - Comprehensive metabolic panel; Future  - CBC with platelets differential; Future  - Hemoglobin A1c; Future  - Lipid panel; Future  - Prolactin; Future  - TSH with free T4 reflex; Future    4. Other specified hypothyroidism  - TSH with free T4 reflex; Future    5. Morbid obesity (H)  - Lipid panel; Future      Please call or return to clinic if your symptoms don't go away.    Follow up plan  Return in about 4 weeks (around 6/28/2022) for DM Follow Up.    Thank you for coming to [facility] today.  Lab Testing:  **If you had lab testing today and your results are reassuring or normal they will be mailed to you or sent through [employer] within 7 days.   **If the lab tests need quick action we will call you with the results.  **If you are having labs done on a different day, please call [phone] to schedule at [facility] or [phone] for other [facility] Outpatient Lab locations. Labs do not offer walk-in appointments.  The phone number we will call with results is # [phone] (home) none (work). If this is not the best number please call our clinic and change the number.  Medication Refills:  If you need any refills please call your pharmacy and they will contact us.   If you need to  your refill at a new pharmacy, please contact the new pharmacy directly. The new pharmacy will help you get your medications transferred faster.   Scheduling:  If you have any concerns about today's visit or wish to schedule another appointment please call our office during normal business hours [phone] (8-5:00 M-F)  If a referral was made to an [facility] specialty provider and you  do not get a call from central scheduling, please refer to directions on your visit summary or call our office during normal business hours for assistance.   If a Mammogram was ordered for you at the Breast Center call [phone] to schedule or change your appointment.  If you had an XRay/CT/Ultrasound/MRI ordered the number is [phone] to schedule or change your radiology appointment.   [facility] has limited ultrasound appointments available on Wednesdays, if you would like your ultrasound at [facility], please call [phone] to schedule.   Medical Concerns:  If you have urgent medical concerns please call [phone] at any time of the day.    Preeti Lin MD

## 2022-05-31 NOTE — PROGRESS NOTES
Preceptor Attestation:    I discussed the patient with the resident and evaluated the patient in person. I have verified the content of the note, which accurately reflects my assessment of the patient and the plan of care.   Supervising Physician:  Aurora Rose MD.

## 2022-06-01 ENCOUNTER — TELEPHONE (OUTPATIENT)
Dept: GASTROENTEROLOGY | Facility: CLINIC | Age: 72
End: 2022-06-01
Payer: COMMERCIAL

## 2022-06-01 ENCOUNTER — DOCUMENTATION ONLY (OUTPATIENT)
Dept: FAMILY MEDICINE | Facility: CLINIC | Age: 72
End: 2022-06-01
Payer: COMMERCIAL

## 2022-06-01 DIAGNOSIS — K73.0: ICD-10-CM

## 2022-06-01 DIAGNOSIS — R79.89 ELEVATED LFTS: ICD-10-CM

## 2022-06-01 DIAGNOSIS — Z86.19 HX OF HEPATITIS C: Primary | ICD-10-CM

## 2022-06-01 LAB — GGT SERPL-CCNC: 122 U/L (ref 0–75)

## 2022-06-01 NOTE — PROGRESS NOTES
"When opening a documentation only encounter, be sure to enter in \"Chief Complaint\" Forms and in \" Comments\" Title of form, description if needed.    Feliciano is a 72 year old  male  Form received via: Appointment  Form now resides in: Scan Basket, faxed and ready    Form has been completed by provider.     Form sent out via: Fax to [employer] [place]       Patient informed: Yes  Output date: June 1, 2022    Pili Collins CMA      **Please close the encounter**        Pili Collins CMA                  "

## 2022-06-01 NOTE — TELEPHONE ENCOUNTER
Screening Questions  BlueKIND OF PREP RedLOCATION [review exclusion criteria] GreenSEDATION TYPE  1. Have you had a positive covid test in the last 90 days? N     2. Do you have a legal guardian or medical Power of ?  Are you able to give consent for your medical care? N  (Sedation review/consideration needed)    3. Are you active on mychart? N    4. What insurance is in the chart? [employer]     3.   Ordering/Referring Provider: Aurora Rose MD    4. BMI 38.02 [BMI OVER 40-EXTENDED PREP]  If greater than 40 review exclusion criteria [PAC APPT IF @ [facility]]      5.  Respiratory Screening :  [If yes to any of the following HOSPITAL setting only]     Do you use daily home oxygen? N    Do you have mod to severe Obstructive Sleep Apnea? N  [OKAY @ [facility] SH PH RI]   Do you have Pulmonary Hypertension? N     Do you have UNCONTROLLED asthma? N      6.   Have you had a heart or lung transplant? N      7.   Are you currently on dialysis? N [ If yes, G-PREP & HOSPITAL setting only]     8.   Do you have chronic kidney disease? N [ If yes, G-PREP ]    9.   Have you had a stroke or Transient ischemic attack (TIA - aka  mini stroke ) within 6 months?  N (If yes, please review exclusion criteria)    10.   In the past 6 months, have you had any heart related issues including cardiomyopathy or heart attack? N           If yes, did it require cardiac stenting or other implantable device? N      11.   Do you have any implantable devices in your body (pacemaker, defib, LVAD)? N (If yes, please review exclusion criteria)    12.   Do you take nitroglycerin? N           If yes, how often? N  (if yes, HOSPITAL setting ONLY)    13.   Are you currently taking any blood thinners? N           [IF YES, INFORM PATIENT TO FOLLOW UP W/ ORDERING PROVIDER FOR BRIDGING INSTRUCTIONS]     14.   Do you have a diagnosis of diabetes? yes  [ If yes, G-PREP ]    15.   [FEMALES] Are you currently pregnant? NA    16.   Are you taking any  prescription pain medications on a routine schedule?  N  [ If yes, EXTENDED PREP.] [If yes, MAC]    17.   Do you have any chemical dependencies such as alcohol, street drugs, or methadone?  N [If yes, MAC]    18.   Do you have any history of post-traumatic stress syndrome, severe anxiety or history of psychosis?  N, SOMETIMES PER PT  [If yes, MAC]    19.   Do you transfer independently?  Y    20.  On a regular basis do you go 3-5 days between bowel movements? N   [ If yes, EXTENDED PREP.]    21.   Preferred LOCAL Pharmacy for Pre Prescription     [facility], [address] [facility]    Scheduling Details    Caller : Feliciano Ross    Type of Procedure Scheduled: COLON  Which Colonoscopy Prep was Sent?:   G PREP  Diabetes    Surgeon: CHIRAG  Date of Procedure: 6/15  Location:     Sedation Type: CS  Conscious Sedation- Needs  for 6 hours after the procedure  MAC/General-Needs  for 24 hours after procedure    Pre-op Required at [facility] [place], [place] and OR for MAC sedation: N  (advise patient they will need a pre-op prior to procedure -)      Informed patient they will need an adult  Y  Patient uses medical transportation.  Cannot take any type of public or medical transportation alone    Pre-Procedure Covid test to be completed at [facility] or Externally: Y  Patient states he has an at-home testing kit and will use that. I informed him to take 1-2 days before procedure and bring picture of results to appt.    Confirmed Nurse will call to complete assessment Y    Additional comments:  Pt is hard of hearing so need to speak loudly and slowly

## 2022-06-02 PROBLEM — K73.0: Status: ACTIVE | Noted: 2022-06-02

## 2022-06-13 ENCOUNTER — LAB (OUTPATIENT)
Dept: LAB | Facility: CLINIC | Age: 72
End: 2022-06-13
Payer: COMMERCIAL

## 2022-06-13 ENCOUNTER — TELEPHONE (OUTPATIENT)
Dept: GASTROENTEROLOGY | Facility: CLINIC | Age: 72
End: 2022-06-13

## 2022-06-13 DIAGNOSIS — Z20.822 ENCOUNTER FOR LABORATORY TESTING FOR COVID-19 VIRUS: ICD-10-CM

## 2022-06-13 PROCEDURE — U0003 INFECTIOUS AGENT DETECTION BY NUCLEIC ACID (DNA OR RNA); SEVERE ACUTE RESPIRATORY SYNDROME CORONAVIRUS 2 (SARS-COV-2) (CORONAVIRUS DISEASE [COVID-19]), AMPLIFIED PROBE TECHNIQUE, MAKING USE OF HIGH THROUGHPUT TECHNOLOGIES AS DESCRIBED BY CMS-2020-01-R: HCPCS

## 2022-06-13 PROCEDURE — U0005 INFEC AGEN DETEC AMPLI PROBE: HCPCS

## 2022-06-13 NOTE — TELEPHONE ENCOUNTER
A nurse from the patient's primary care clinic called in requesting that someone call the patient regarding his colonoscopy prep instructions.   Looks like the patient is scheduled with Dr. Vitale.     Sent high-priority staff message to [facility] endoscopy pool to call the patient.     Rosa Saldana RN   [facility]oscopy

## 2022-06-14 LAB — SARS-COV-2 RNA RESP QL NAA+PROBE: NEGATIVE

## 2022-06-15 ENCOUNTER — HOSPITAL ENCOUNTER (OUTPATIENT)
Facility: CLINIC | Age: 72
Discharge: HOME | End: 2022-06-15
Attending: COLON & RECTAL SURGERY | Admitting: COLON & RECTAL SURGERY
Payer: COMMERCIAL

## 2022-06-15 VITALS
HEART RATE: 65 BPM | OXYGEN SATURATION: 97 % | BODY MASS INDEX: 32.58 KG/M2 | DIASTOLIC BLOOD PRESSURE: 97 MMHG | RESPIRATION RATE: 29 BRPM | SYSTOLIC BLOOD PRESSURE: 146 MMHG | WEIGHT: 220 LBS | HEIGHT: 69 IN

## 2022-06-15 DIAGNOSIS — Z12.11 ENCOUNTER FOR SCREENING COLONOSCOPY: Primary | ICD-10-CM

## 2022-06-15 LAB
COLONOSCOPY: NORMAL
GLUCOSE BLDC GLUCOMTR-MCNC: 126 MG/DL (ref 70–99)

## 2022-06-15 PROCEDURE — 88305 TISSUE EXAM BY PATHOLOGIST: CPT | Mod: 26 | Performed by: PATHOLOGY

## 2022-06-15 PROCEDURE — G0500 MOD SEDAT ENDO SERVICE >5YRS: HCPCS | Performed by: COLON & RECTAL SURGERY

## 2022-06-15 PROCEDURE — 45385 COLONOSCOPY W/LESION REMOVAL: CPT | Performed by: COLON & RECTAL SURGERY

## 2022-06-15 PROCEDURE — 250N000011 HC RX IP 250 OP 636: Performed by: COLON & RECTAL SURGERY

## 2022-06-15 PROCEDURE — 82962 GLUCOSE BLOOD TEST: CPT

## 2022-06-15 PROCEDURE — 88305 TISSUE EXAM BY PATHOLOGIST: CPT | Mod: TC | Performed by: COLON & RECTAL SURGERY

## 2022-06-15 PROCEDURE — 99153 MOD SED SAME PHYS/QHP EA: CPT | Performed by: COLON & RECTAL SURGERY

## 2022-06-15 RX ORDER — FENTANYL CITRATE 50 UG/ML
INJECTION, SOLUTION INTRAMUSCULAR; INTRAVENOUS PRN
Status: COMPLETED | OUTPATIENT
Start: 2022-06-15 | End: 2022-06-15

## 2022-06-15 RX ADMIN — MIDAZOLAM 1 MG: 1 INJECTION INTRAMUSCULAR; INTRAVENOUS at 15:59

## 2022-06-15 RX ADMIN — FENTANYL CITRATE 50 MCG: 50 INJECTION, SOLUTION INTRAMUSCULAR; INTRAVENOUS at 15:59

## 2022-06-15 RX ADMIN — FENTANYL CITRATE 50 MCG: 50 INJECTION, SOLUTION INTRAMUSCULAR; INTRAVENOUS at 16:05

## 2022-06-15 RX ADMIN — MIDAZOLAM 1 MG: 1 INJECTION INTRAMUSCULAR; INTRAVENOUS at 16:05

## 2022-06-15 NOTE — LETTER
June 21, 2022      Feliciano Ross  [address] [place] [address]        Dear Feliciano,    Thank you for getting your care at [facility]. Please see below for your test results.    They found some polyps, not concerning, but they do recommend follow up/repeat in 5 years    Resulted Orders   Surgical Pathology Exam   Result Value Ref Range    Case Report       Surgical Pathology Report                         Case: [id number]                                  Authorizing Provider:  Sonal Vitale MD   Collected:           06/15/2022 04:13 PM          Ordering Location:     [facility]          Received:            06/16/2022 06:15 AM                                 [facility]                                                          Pathologist:           Lynn Arias MD PhD                                                      Specimen:    Large Intestine, Colon, Ascending, x2                                                      Final Diagnosis       A.  Colon, Ascending, polyps x2, polypectomy:  -Tubular adenomas, fragments  -Negative for high-grade dysplasia and malignancy.        Clinical Information       Procedure:  COLONOSCOPY, FLEXIBLE, WITH LESION REMOVAL USING SNARE  Pre-op Diagnosis: Encounter for screening for malignant neoplasm of colon [Z12.11]  Post-op Diagnosis: Z12.11 - Encounter for screening for malignant neoplasm of colon [ICD-10-CM]      Gross Description       A(1). Large Intestine, Colon, Ascending, x2:  The specimen is received in formalin, labeled with the patient's name, medical record number and other identifying information and designated  ascending colon polyps x2 . It consists of 3 tan soft tissue fragments ranging from 0.4-0.9 cm in greatest dimension. Entirely submitted in one cassette.  ([facility] CM)        Microscopic Description       Microscopic examination was performed.      Performing Labs       The technical component of this testing was  completed at [facility]      Case Images         If you have any concerns about these results please call and leave a message for me or send a MyChart message to the clinic.    Sincerely,    Preeti Lin MD

## 2022-06-15 NOTE — H&P
Colon & Rectal Surgery History and Physical  Pre-Endoscopy Procedure Note    History of Present Illness   I have been asked by Dr. Lin to evaluate this 72 year old male for colorectal cancer screening. He currently denies any abdominal pain, weight loss, bleeding per rectum, or recent change in bowel habits.    Past Medical History  Diagnosis Date     Depressive disorder      Manic affective disorder with recurrent episode      Subclinical hypothyroidism 5/27/2016     Type 2 diabetes mellitus without complication, unspecified whether long term insulin use 9/11/2021       Past Surgical History   History reviewed. No pertinent surgical history.     Medications  Medication Sig     acetaminophen (TYLENOL) 500 MG tablet      aspirin (ASPIRIN) 81 MG EC tablet Take 1 tablet (81 mg) by mouth daily     atorvastatin (LIPITOR) 40 MG tablet Take 1 tablet (40 mg) by mouth daily     ibuprofen (ADVIL/MOTRIN) 400 MG tablet Take 1 tablet (400 mg) by mouth every 4 hours as needed for pain     insulin glargine (LANTUS PEN) 100 UNIT/ML pen Inject 45 Units Subcutaneous At Bedtime     levothyroxine (SYNTHROID/LEVOTHROID) 100 MCG tablet Take 1 tablet (100 mcg) by mouth every morning (before breakfast)     levothyroxine (SYNTHROID/LEVOTHROID) 75 MCG tablet Take ONE (1) tablet (75mcg) by MOUTH every morning (BEFORE breakfast) (Patient not taking: No sig reported)     lisinopril (ZESTRIL) 2.5 MG tablet Take 1 tablet (2.5 mg) by mouth daily     metFORMIN (GLUCOPHAGE-XR) 500 MG 24 hr tablet Take 2 tablets (1,000 mg) by mouth 2 times daily (with meals)     mineral oil-hydrophilic petrolatum (AQUAPHOR) ointment Apply 1-2 x daily to right leg wound     traZODone (DESYREL) 50 MG tablet Take 1 tablet (50 mg) by mouth nightly as needed for sleep (May repeat x 1 if needed)       Allergies  Allergen Reactions     Penicillins      Heats up around sight of injection        Family History   Family history includes Diabetes in his brother and mother.  "    Social History   He reports that he has quit smoking. His smoking use included cigarettes. He has a 91.50 pack-year smoking history. He quit smokeless tobacco use about 17 months ago. He reports current alcohol use. He reports that he does not use drugs.    Review of Systems   Constitutional:  No fever, weight change or fatigue.    Eyes:     No dry eyes or vision changes.   Ears/Nose/Throat/Neck:  No oral ulcers, sore throat or voice change.    Cardiovascular:   No palpitations, syncope, angina or edema.   Respiratory:    No chest pain, excessive sleepiness, shortness of breath or hemoptysis.    Gastrointestinal:   No abdominal pain, nausea, vomiting, diarrhea or heartburn.    Genitourinary:   No dysuria, hematuria, urinary retention or urinary frequency.   Musculoskeletal:  No joint swelling or arthralgias.    Dermatologic:  No skin rash or other skin changes.   Neurologic:    No focal weakness or numbness. No neuropathy.   Psychiatric:    No depression, anxiety, suicidal ideation, or paranoid ideation.   Endocrine:   No cold or heat intolerance, polydipsia, hirsutism, change in libido, or flushing.   Hematology/Lymphatic:  No bleeding or lymphadenopathy.    Allergy/Immunology:  No rhinitis or hives.     Physical Exam   Vitals:  Blood pressure 123/84, pulse 70, height 1.753 m (5' 9\"), weight 99.8 kg (220 lb), SpO2 98 %.    General:  Alert and oriented to person, place and time   Airway: Normal oropharyngeal airway and neck mobility   Lungs:  Clear bilaterally   Heart:  Regular rate and rhythm   Abdomen: Soft, NT, ND, no masses   Extremities: Warm, good capillary refill    ASA Grade: II (mild systemic disease)    Impression: Cleared for use of conscious sedation for colorectal cancer screening    Plan: Proceed with colonoscopy     Sonal Vitale MD  [facility]  [phone]  "

## 2022-06-17 LAB
PATH REPORT.COMMENTS IMP SPEC: NORMAL
PATH REPORT.COMMENTS IMP SPEC: NORMAL
PATH REPORT.FINAL DX SPEC: NORMAL
PATH REPORT.GROSS SPEC: NORMAL
PATH REPORT.MICROSCOPIC SPEC OTHER STN: NORMAL
PATH REPORT.RELEVANT HX SPEC: NORMAL
PHOTO IMAGE: NORMAL

## 2022-06-29 ENCOUNTER — OFFICE VISIT (OUTPATIENT)
Dept: FAMILY MEDICINE | Facility: CLINIC | Age: 72
End: 2022-06-29
Payer: COMMERCIAL

## 2022-06-29 VITALS
HEART RATE: 80 BPM | WEIGHT: 220 LBS | SYSTOLIC BLOOD PRESSURE: 100 MMHG | OXYGEN SATURATION: 97 % | TEMPERATURE: 98.2 F | BODY MASS INDEX: 32.58 KG/M2 | DIASTOLIC BLOOD PRESSURE: 65 MMHG | HEIGHT: 69 IN | RESPIRATION RATE: 16 BRPM

## 2022-06-29 DIAGNOSIS — M54.6 ACUTE MIDLINE THORACIC BACK PAIN: ICD-10-CM

## 2022-06-29 DIAGNOSIS — R79.89 ELEVATED SERUM CREATININE: ICD-10-CM

## 2022-06-29 DIAGNOSIS — Z86.19 HX OF HEPATITIS C: Primary | ICD-10-CM

## 2022-06-29 DIAGNOSIS — Z87.39 HX OF GOUT: ICD-10-CM

## 2022-06-29 DIAGNOSIS — E66.01 MORBID OBESITY (H): ICD-10-CM

## 2022-06-29 DIAGNOSIS — K73.0: ICD-10-CM

## 2022-06-29 DIAGNOSIS — Z87.898 HISTORY OF ALCOHOL USE DISORDER: ICD-10-CM

## 2022-06-29 LAB
ALBUMIN SERPL BCG-MCNC: 4 G/DL (ref 3.5–5.2)
ALP SERPL-CCNC: 131 U/L (ref 40–129)
ALT SERPL W P-5'-P-CCNC: 41 U/L (ref 10–50)
ANION GAP SERPL CALCULATED.3IONS-SCNC: 12 MMOL/L (ref 7–15)
AST SERPL W P-5'-P-CCNC: 43 U/L (ref 10–50)
BILIRUB SERPL-MCNC: 1.1 MG/DL
BUN SERPL-MCNC: 25.1 MG/DL (ref 8–23)
CALCIUM SERPL-MCNC: 9.9 MG/DL (ref 8.8–10.2)
CHLORIDE SERPL-SCNC: 99 MMOL/L (ref 98–107)
CREAT SERPL-MCNC: 1.42 MG/DL (ref 0.67–1.17)
DEPRECATED HCO3 PLAS-SCNC: 23 MMOL/L (ref 22–29)
GFR SERPL CREATININE-BSD FRML MDRD: 53 ML/MIN/1.73M2
GLUCOSE SERPL-MCNC: 109 MG/DL (ref 70–99)
POTASSIUM SERPL-SCNC: 5.9 MMOL/L (ref 3.4–5.3)
PROT SERPL-MCNC: 7 G/DL (ref 6.4–8.3)
SODIUM SERPL-SCNC: 134 MMOL/L (ref 136–145)
URATE SERPL-MCNC: 8.5 MG/DL (ref 3.4–7)

## 2022-06-29 PROCEDURE — 80053 COMPREHEN METABOLIC PANEL: CPT | Performed by: STUDENT IN AN ORGANIZED HEALTH CARE EDUCATION/TRAINING PROGRAM

## 2022-06-29 PROCEDURE — 99214 OFFICE O/P EST MOD 30 MIN: CPT | Mod: GC | Performed by: STUDENT IN AN ORGANIZED HEALTH CARE EDUCATION/TRAINING PROGRAM

## 2022-06-29 PROCEDURE — 36415 COLL VENOUS BLD VENIPUNCTURE: CPT | Performed by: STUDENT IN AN ORGANIZED HEALTH CARE EDUCATION/TRAINING PROGRAM

## 2022-06-29 PROCEDURE — 84550 ASSAY OF BLOOD/URIC ACID: CPT | Performed by: STUDENT IN AN ORGANIZED HEALTH CARE EDUCATION/TRAINING PROGRAM

## 2022-06-29 RX ORDER — FAMOTIDINE 20 MG/1
20 TABLET, FILM COATED ORAL 2 TIMES DAILY
Qty: 28 TABLET | Refills: 0 | Status: SHIPPED | OUTPATIENT
Start: 2022-06-29 | End: 2022-07-13

## 2022-06-29 RX ORDER — IBUPROFEN 600 MG/1
600 TABLET, FILM COATED ORAL EVERY 6 HOURS PRN
Qty: 60 UNITS | Refills: 0 | Status: SHIPPED | OUTPATIENT
Start: 2022-06-29 | End: 2023-09-14

## 2022-06-29 NOTE — PATIENT INSTRUCTIONS
Patient Education   Here is the plan from today's visit    1. Hx of hepatitis C  2. Morbid obesity (H)  3. Chronic persistent hepatitis, not elsewhere classified (H)  To check for liver damage due to high liver enzymes  Make sure to talk to your psychiatrist about you labs!  - US Abdomen Limited; Future  - Comprehensive metabolic panel; Future    4. Elevated serum creatinine  - Comprehensive metabolic panel; Future    5. Hx of gout  - Uric acid; Future    6. Acute midline thoracic back pain  May get calcitonin if x-ray shows a new compression fracture  Take ibuprofen every 6 hours and take pepcid twice a day while on ibuprofen  - XR Thoracic Spine 2 Views; Future  - ibuprofen (ADVIL/MOTRIN) 600 MG tablet; Take 1 tablet (600 mg) by mouth every 6 hours as needed for moderate pain  Dispense: 60 Units; Refill: 0  - famotidine (PEPCID) 20 MG tablet; Take 1 tablet (20 mg) by mouth 2 times daily for 14 days  Dispense: 28 tablet; Refill: 0      Please call or return to clinic if your symptoms don't go away.    Follow up plan  Return in about 2 weeks (around 7/13/2022).    Thank you for coming to [facility] [place] today.  Lab Testing:  **If you had lab testing today and your results are reassuring or normal they will be mailed to you or sent through [employer] within 7 days.   **If the lab tests need quick action we will call you with the results.  **If you are having labs done on a different day, please call [phone] to schedule at [facility] or [phone] for other [facility]patient Lab locations. Labs do not offer walk-in appointments.  The phone number we will call with results is # [phone] (home) none (work). If this is not the best number please call our clinic and change the number.  Medication Refills:  If you need any refills please call your pharmacy and they will contact us.   If you need to  your refill at a new pharmacy, please contact the new pharmacy directly. The new pharmacy will  help you get your medications transferred faster.   Scheduling:  If you have any concerns about today's visit or wish to schedule another appointment please call our office during normal business hours [phone] (8-5:00 M-F)  If a referral was made to an [facility] specialty provider and you do not get a call from central scheduling, please refer to directions on your visit summary or call our office during normal business hours for assistance.   If a Mammogram was ordered for you at the Breast Center call [phone] to schedule or change your appointment.  If you had an XRay/CT/Ultrasound/MRI ordered the number is [phone] to schedule or change your radiology appointment.   [facility] has limited ultrasound appointments available on Wednesdays, if you would like your ultrasound at [facility], please call [phone] to schedule.   Medical Concerns:  If you have urgent medical concerns please call [phone] at any time of the day.    Preeti Lin MD
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
## 2022-06-30 ENCOUNTER — TELEPHONE (OUTPATIENT)
Dept: FAMILY MEDICINE | Facility: CLINIC | Age: 72
End: 2022-06-30

## 2022-06-30 PROBLEM — Z87.898 HISTORY OF ALCOHOL USE DISORDER: Status: ACTIVE | Noted: 2022-06-30

## 2022-06-30 NOTE — TELEPHONE ENCOUNTER
Unable to katelin a hold of patient. If patient calls back please transfer to an available RN to relay the message below:    Please call patient with the following message Uric Acid is up and would recommend allopurinol to prevent more gout flares. You kidney function and electrolytes are off. You potassium could affect your heart at this level and  would encourage you to go to the emergency room if unable. I'm not sure why your kidneys are injured and if they aren't working well your potassium could get worse. Please follow up with Dr. Desouza as soon as able if unable to go to ER.     Leesa Pleitez RN

## 2022-07-01 NOTE — TELEPHONE ENCOUNTER
RN talked to patient on the phone about lab results and recommendations. Patient states he feels fine and that he eats a lot of bananas. RN encouraged patient to decrease his potassium intake until his follow-up and if he was to experience cardiac symptoms to be seen in the ED. Patient verbalized understanding.     Leesa Pleitez RN

## 2022-07-07 ENCOUNTER — ANCILLARY PROCEDURE (OUTPATIENT)
Dept: ULTRASOUND IMAGING | Facility: CLINIC | Age: 72
End: 2022-07-07
Attending: FAMILY MEDICINE
Payer: COMMERCIAL

## 2022-07-07 ENCOUNTER — ANCILLARY PROCEDURE (OUTPATIENT)
Dept: GENERAL RADIOLOGY | Facility: CLINIC | Age: 72
End: 2022-07-07
Attending: FAMILY MEDICINE
Payer: COMMERCIAL

## 2022-07-07 DIAGNOSIS — Z53.9 DIAGNOSIS NOT YET DEFINED: Primary | ICD-10-CM

## 2022-07-07 DIAGNOSIS — K73.0: ICD-10-CM

## 2022-07-07 DIAGNOSIS — Z86.19 HX OF HEPATITIS C: ICD-10-CM

## 2022-07-07 DIAGNOSIS — E66.01 MORBID OBESITY (H): ICD-10-CM

## 2022-07-07 DIAGNOSIS — M54.6 ACUTE MIDLINE THORACIC BACK PAIN: ICD-10-CM

## 2022-07-07 PROCEDURE — 72070 X-RAY EXAM THORAC SPINE 2VWS: CPT

## 2022-07-07 PROCEDURE — 76705 ECHO EXAM OF ABDOMEN: CPT

## 2022-07-29 ENCOUNTER — OFFICE VISIT (OUTPATIENT)
Dept: FAMILY MEDICINE | Facility: CLINIC | Age: 72
End: 2022-07-29
Payer: COMMERCIAL

## 2022-07-29 ENCOUNTER — MEDICAL CORRESPONDENCE (OUTPATIENT)
Dept: HEALTH INFORMATION MANAGEMENT | Facility: CLINIC | Age: 72
End: 2022-07-29

## 2022-07-29 VITALS
WEIGHT: 215 LBS | RESPIRATION RATE: 18 BRPM | DIASTOLIC BLOOD PRESSURE: 72 MMHG | BODY MASS INDEX: 33.74 KG/M2 | SYSTOLIC BLOOD PRESSURE: 102 MMHG | OXYGEN SATURATION: 95 % | HEART RATE: 88 BPM | HEIGHT: 67 IN

## 2022-07-29 DIAGNOSIS — E11.9 TYPE 2 DIABETES MELLITUS WITHOUT COMPLICATION, UNSPECIFIED WHETHER LONG TERM INSULIN USE (H): ICD-10-CM

## 2022-07-29 DIAGNOSIS — E03.9 HYPOTHYROIDISM, UNSPECIFIED TYPE: ICD-10-CM

## 2022-07-29 PROCEDURE — 99214 OFFICE O/P EST MOD 30 MIN: CPT | Mod: GC

## 2022-07-29 RX ORDER — LEVOTHYROXINE SODIUM 100 UG/1
100 TABLET ORAL
Qty: 90 TABLET | Refills: 3 | Status: SHIPPED | OUTPATIENT
Start: 2022-07-29 | End: 2023-06-27

## 2022-07-29 NOTE — PROGRESS NOTES
Preceptor Attestation:    I discussed the patient with the resident and evaluated the patient in person. I have verified the content of the note, which accurately reflects my assessment of the patient and the plan of care.   Supervising Physician:  Lety Nguyễn DO.

## 2022-07-29 NOTE — PATIENT INSTRUCTIONS
Patient Education   Here is the plan from today's visit    1. Hypothyroidism, unspecified type  - levothyroxine (SYNTHROID/LEVOTHROID) 100 MCG tablet; Take 1 tablet (100 mcg) by mouth every morning (before breakfast)  Dispense: 90 tablet; Refill: 3    2. Type 2 diabetes mellitus without complication, unspecified whether long term insulin use (H)  - insulin pen needle 30G X 5 MM; Use pen needles daily or as directed.  Dispense: 100 each; Refill: 11  - Called pharmacy, and they are going to refill his metformin as well    Please call or return to clinic if your symptoms don't go away.    Follow up plan  Return in about 6 months (around 1/29/2023) for Follow up.    Thank you for coming to [facility] today.  Lab Testing:  **If you had lab testing today and your results are reassuring or normal they will be mailed to you or sent through [employer] within 7 days.   **If the lab tests need quick action we will call you with the results.  **If you are having labs done on a different day, please call [phone] to schedule at [facility] or [phone] for other [facility] locations. Labs do not offer walk-in appointments.  The phone number we will call with results is # [phone] (home) none (work). If this is not the best number please call our clinic and change the number.  Medication Refills:  If you need any refills please call your pharmacy and they will contact us.   If you need to  your refill at a new pharmacy, please contact the new pharmacy directly. The new pharmacy will help you get your medications transferred faster.   Scheduling:  If you have any concerns about today's visit or wish to schedule another appointment please call our office during normal business hours [phone] (8-5:00 M-F)  If a referral was made to an [facility] specialty provider and you do not get a call from central scheduling, please refer to directions on your visit summary or call our office  during normal business hours for assistance.   If a Mammogram was ordered for you at the Breast Center call [phone] to schedule or change your appointment.  If you had an XRay/CT/Ultrasound/MRI ordered the number is [phone] to schedule or change your radiology appointment.   [facility] has limited ultrasound appointments available on Wednesdays, if you would like your ultrasound at [facility], please call [phone] to schedule.   Medical Concerns:  If you have urgent medical concerns please call [phone] at any time of the day.    Aparna Rg DO, MPH

## 2022-07-29 NOTE — PROGRESS NOTES
"  Assessment & Plan     Hypothyroidism, unspecified type  - Currently stable, TSH 1.89 two months ago  - levothyroxine (SYNTHROID/LEVOTHROID) 100 MCG tablet; Take 1 tablet (100 mcg) by mouth every morning (before breakfast)    Type 2 diabetes mellitus without complication, unspecified whether long term insulin use (H)  - Currently stable, HbA1c was 5.7% two months ago  - insulin pen needle 30G X 5 MM; Use pen needles daily or as directed.  - Called the pharmacy to ask about what medication Feliciano needs, and they said that his metformin is due to be refilled soon, so they plan to refill his metformin as [id number]}     BMI:   Estimated body mass index is 33.27 kg/m  as calculated from the following:    Height as of this encounter: 1.712 m (5' 7.4\").    Weight as of this encounter: 97.5 kg (215 lb).     Return in about 6 months (around 1/29/2023) for Follow up.    Aparna Rg DO, MPH  [facility] a 72 year old, presenting for the following health issues:  Refill Request (Insulin pen needles, levothyroxine potentially needs refilled. )      BRENNAN Wiggins is a 72-year-old male with a past medical history of hypothyroidism, diabetes mellitus type II, history of alcohol use disorder, and paranoid schizophrenia who presents to the clinic for refills of his levothyroxine and insulin pen needles. He also states that he spoke to his pharmacy earlier today and requested a refill of a medication. He does not know what the medication is called or what it is for, but he describes it as a \"big, white pill\". Notes that he sometimes experiences orthostatic hypotension, but denies abdominal pain, chest pain, headache, shortness of breath.    Review of Systems   Constitutional, HEENT, cardiovascular, pulmonary, gi and gu systems are negative, except as otherwise noted.      Objective    /72 (BP Location: Left arm, Patient Position: Sitting, Cuff Size: Adult Large)   Pulse 88   Resp " "18   Ht 1.712 m (5' 7.4\")   Wt 97.5 kg (215 lb)   SpO2 95%   BMI 33.27 kg/m    Body mass index is 33.27 kg/m .  Physical Exam  Constitutional:       Appearance: Normal appearance.   HENT:      Head: Normocephalic and atraumatic.      Ears:      Comments: Hearing aids present. Impaired hearing.  Eyes:      Extraocular Movements: Extraocular movements intact.      Conjunctiva/sclera: Conjunctivae normal.      Pupils: Pupils are equal, round, and reactive to light.   Cardiovascular:      Rate and Rhythm: Normal rate and regular rhythm.      Heart sounds: Normal heart sounds.   Pulmonary:      Effort: Pulmonary effort is normal.      Breath sounds: Normal breath sounds.   Abdominal:      General: Bowel sounds are normal.      Palpations: Abdomen is soft.   Musculoskeletal:      Cervical back: Neck supple.   Skin:     General: Skin is warm and dry.   Neurological:      General: No focal deficit present.      Mental Status: He is alert and oriented to person, place, and time.   Psychiatric:         Mood and Affect: Mood normal.       ----- Service Performed and Documented by Resident or Fellow ------      .  ..  "

## 2022-08-03 DIAGNOSIS — E11.9 TYPE 2 DIABETES MELLITUS WITHOUT COMPLICATION, UNSPECIFIED WHETHER LONG TERM INSULIN USE (H): ICD-10-CM

## 2022-08-03 RX ORDER — LANCETS 21 GAUGE
1 EACH MISCELLANEOUS DAILY
Qty: 100 EACH | Refills: 4 | Status: SHIPPED | OUTPATIENT
Start: 2022-08-03 | End: 2023-06-27

## 2022-08-03 NOTE — TELEPHONE ENCOUNTER
"Request for medication refill:  Assure Stevan Lancets MISC  Providers if patient needs an appointment and you are willing to give a one month supply please refill for one month and  send a letter/MyChart using \".SMILLIMITEDREFILL\" .smillimited and route chart to \"P SMI \" (Giving one month refill in non controlled medications is strongly recommended before denial)    If refill has been denied, meaning absolutely no refills without visit, please complete the smart phrase \".smirxrefuse\" and route it to the \"P SMI MED REFILLS\"  pool to inform the patient and the pharmacy.    Jennifer Jacobs MA        "

## 2022-08-31 DIAGNOSIS — E11.9 TYPE 2 DIABETES MELLITUS WITHOUT COMPLICATION, UNSPECIFIED WHETHER LONG TERM INSULIN USE (H): ICD-10-CM

## 2022-08-31 NOTE — TELEPHONE ENCOUNTER
"Request for medication refill:  insulin glargine (LANTUS PEN) 100 UNIT/ML pen    Providers if patient needs an appointment and you are willing to give a one month supply please refill for one month and  send a letter/MyChart using \".SMILLIMITEDREFILL\" .smillimited and route chart to \"P SMI \" (Giving one month refill in non controlled medications is strongly recommended before denial)    If refill has been denied, meaning absolutely no refills without visit, please complete the smart phrase \".smirxrefuse\" and route it to the \"P SMI MED REFILLS\"  pool to inform the patient and the pharmacy.    Jennifer Jacobs MA        "

## 2022-09-20 ENCOUNTER — DOCUMENTATION ONLY (OUTPATIENT)
Dept: FAMILY MEDICINE | Facility: CLINIC | Age: 72
End: 2022-09-20

## 2022-09-20 NOTE — PROGRESS NOTES
"When opening a documentation only encounter, be sure to enter in \"Chief Complaint\" Forms and in \" Comments\" Title of form, description if needed.    Feliciano is a 72 year old  male  Form received via: Fax  Form now resides in: Provider Don Jacobs MA                  "

## 2022-09-30 NOTE — PROGRESS NOTES
Form has been completed by provider.     Form sent out via: Fax to [facility] at Fax Number: [phone]  Patient informed: N/A  Output date: September 30, 2022    Jennifer Jacobs MA      **Please close the encounter**

## 2022-10-19 ENCOUNTER — TELEPHONE (OUTPATIENT)
Dept: PHARMACY | Facility: CLINIC | Age: 72
End: 2022-10-19

## 2022-10-19 NOTE — TELEPHONE ENCOUNTER
Clinical Medication Therapy Management Pharmacy Note    Called patient to schedule Medication Therapy Management visit per [facility] [employer]. No answer. Not able to leave a message to call back.    Lulu Patel, Pharm D., MPH  [facility] Pharmacist - [facility]  Pager: [phone]

## 2022-10-20 ENCOUNTER — TELEPHONE (OUTPATIENT)
Dept: FAMILY MEDICINE | Facility: CLINIC | Age: 72
End: 2022-10-20

## 2022-10-20 NOTE — TELEPHONE ENCOUNTER
[facility] phone call message - order or referral request for patient:     Order or referral being requested: Verbal Orders      Additional Comments:  -Monthly injections; schizophrenia  -Check diabetes management  -Orders are for 9 weeks.    OK to leave a message on voice mail? Yes       Primary language: English      needed? No    Call taken on October 20, 2022 at 12:58 PM by Delmi Banuelos

## 2022-10-20 NOTE — TELEPHONE ENCOUNTER
Returned call to home care nurse to give verbal orders per protocol as requested. Nurse verbalized understanding.    Makayla Johnson RN

## 2022-11-10 ENCOUNTER — TELEPHONE (OUTPATIENT)
Dept: PHARMACY | Facility: CLINIC | Age: 72
End: 2022-11-10

## 2022-11-10 ENCOUNTER — DOCUMENTATION ONLY (OUTPATIENT)
Dept: FAMILY MEDICINE | Facility: CLINIC | Age: 72
End: 2022-11-10

## 2022-11-10 NOTE — PROGRESS NOTES
"When opening a documentation only encounter, be sure to enter in \"Chief Complaint\" Forms and in \" Comments\" Title of form, description if needed.    Feliciano is a 72 year old  male  Form received via: Fax  Form now resides in: Provider Ready    Joya Purcell RN                  "

## 2022-11-10 NOTE — TELEPHONE ENCOUNTER
MTM referral from: Patient's insurance    MTM referral outreach attempt #2 on November 10, 2022 at 2:52 PM      Outcome: No Answer, no VM available.    Richa CervantesD, [facility]  Medication Therapy Management Provider  Pager: [phone]

## 2022-11-11 NOTE — Clinical Note
Heverna Obando,     Looks like the e-consult can't help him get his shot and he is overdue by about a week and no one at clinic knows how to set it up. I'm out of town now. Can we tell him to go to [facility] ED to see if he can get it there while we figure out how to schedule it?    Preeti Lin MD  
So the e-consult couldn't help us and suggested infusion sweet but I can't see it as an option in therapy plan. Can we try to schedule him with Dr. Bello at our clinic since psych coordinators are having trouble or can we communication with them?     Preeti Lin MD  
3/mm

## 2022-11-22 DIAGNOSIS — E11.9 TYPE 2 DIABETES MELLITUS WITHOUT COMPLICATION, UNSPECIFIED WHETHER LONG TERM INSULIN USE (H): ICD-10-CM

## 2022-11-22 RX ORDER — METFORMIN HCL 500 MG
1000 TABLET, EXTENDED RELEASE 24 HR ORAL 2 TIMES DAILY WITH MEALS
Qty: 360 TABLET | Refills: 0 | Status: SHIPPED | OUTPATIENT
Start: 2022-11-22 | End: 2023-02-16

## 2022-11-22 NOTE — TELEPHONE ENCOUNTER
[facility] phone call message- patient requesting a refill:    Full Medication Name:   metFORMIN (GLUCOPHAGE-XR) 500 MG 24 hr tablet 180 tablet 3           Pharmacy confirmed as   [facility] - [place], [place] - [address]  [address] [address]  Phone: [phone] Fax: [phone]  : Yes    Additional Comments: Patient called and said that he is completely out of medications and needs a refill. Call back number for patient is [phone].     OK to leave a message on voice mail? Yes    Primary language: English      needed? No    Call taken on November 22, 2022 at 1:59 PM by Candice Pitts

## 2022-12-01 DIAGNOSIS — Z53.9 DIAGNOSIS NOT YET DEFINED: Primary | ICD-10-CM

## 2022-12-01 NOTE — PROGRESS NOTES
Fax resent on 12/13/2022 with faculty signature    Joya Purcell RN      Form has been completed by provider.     Form sent out via: [facility] to [facility] at Fax Number: [phone]  Patient informed: N/A  Output date: December 1, 2022    Jennifer Jacobs MA      **Please close the encounter**

## 2022-12-20 ENCOUNTER — DOCUMENTATION ONLY (OUTPATIENT)
Dept: FAMILY MEDICINE | Facility: CLINIC | Age: 72
End: 2022-12-20

## 2022-12-23 DIAGNOSIS — E11.9 TYPE 2 DIABETES MELLITUS WITHOUT COMPLICATION, UNSPECIFIED WHETHER LONG TERM INSULIN USE (H): ICD-10-CM

## 2022-12-23 NOTE — TELEPHONE ENCOUNTER
"Request for medication refill:  insulin glargine (LANTUS PEN) 100 UNIT/ML pen    Providers if patient needs an appointment and you are willing to give a one month supply please refill for one month and  send a letter/MyChart using \".SMILLIMITEDREFILL\" .smillimited and route chart to \"P SMI \" (Giving one month refill in non controlled medications is strongly recommended before denial)    If refill has been denied, meaning absolutely no refills without visit, please complete the smart phrase \".smirxrefuse\" and route it to the \"P SMI MED REFILLS\"  pool to inform the patient and the pharmacy.    Marjorie Isabel RN        "

## 2022-12-28 NOTE — PROGRESS NOTES
Form has been completed by provider.     Form sent out via: Fax to [facility] at Fax Number: [phone]  Patient informed: N/A  Output date: December 28, 2022    Jennifer Jacobs MA      **Please close the encounter**

## 2023-01-17 ENCOUNTER — OFFICE VISIT (OUTPATIENT)
Dept: FAMILY MEDICINE | Facility: CLINIC | Age: 73
End: 2023-01-17
Payer: COMMERCIAL

## 2023-01-17 VITALS
HEIGHT: 67 IN | TEMPERATURE: 97.6 F | HEART RATE: 92 BPM | SYSTOLIC BLOOD PRESSURE: 93 MMHG | RESPIRATION RATE: 16 BRPM | WEIGHT: 201 LBS | BODY MASS INDEX: 31.55 KG/M2 | DIASTOLIC BLOOD PRESSURE: 65 MMHG | OXYGEN SATURATION: 96 %

## 2023-01-17 DIAGNOSIS — Z23 NEED FOR PROPHYLACTIC VACCINATION AND INOCULATION AGAINST INFLUENZA: ICD-10-CM

## 2023-01-17 DIAGNOSIS — F17.200 SMOKER UNMOTIVATED TO QUIT: ICD-10-CM

## 2023-01-17 DIAGNOSIS — Z23 NEED FOR VACCINATION: ICD-10-CM

## 2023-01-17 DIAGNOSIS — E11.9 TYPE 2 DIABETES MELLITUS WITHOUT COMPLICATION, UNSPECIFIED WHETHER LONG TERM INSULIN USE (H): Primary | ICD-10-CM

## 2023-01-17 DIAGNOSIS — I95.0 IDIOPATHIC HYPOTENSION: ICD-10-CM

## 2023-01-17 DIAGNOSIS — Z23 HIGH PRIORITY FOR 2019-NCOV VACCINE: ICD-10-CM

## 2023-01-17 LAB
CREAT UR-MCNC: 94.8 MG/DL
HBA1C MFR BLD: 5.4 % (ref 0–5.6)
MICROALBUMIN UR-MCNC: <12 MG/L
MICROALBUMIN/CREAT UR: NORMAL MG/G{CREAT}

## 2023-01-17 PROCEDURE — 99214 OFFICE O/P EST MOD 30 MIN: CPT | Mod: 25

## 2023-01-17 PROCEDURE — 82043 UR ALBUMIN QUANTITATIVE: CPT

## 2023-01-17 PROCEDURE — 0134A COVID-19 VACCINE BIVALENT BOOSTER 18+ (MODERNA): CPT

## 2023-01-17 PROCEDURE — 91313 COVID-19 VACCINE BIVALENT BOOSTER 18+ (MODERNA): CPT

## 2023-01-17 PROCEDURE — 82570 ASSAY OF URINE CREATININE: CPT

## 2023-01-17 PROCEDURE — 36415 COLL VENOUS BLD VENIPUNCTURE: CPT

## 2023-01-17 PROCEDURE — 83036 HEMOGLOBIN GLYCOSYLATED A1C: CPT

## 2023-01-17 PROCEDURE — G0008 ADMIN INFLUENZA VIRUS VAC: HCPCS

## 2023-01-17 PROCEDURE — 90662 IIV NO PRSV INCREASED AG IM: CPT

## 2023-01-17 PROCEDURE — G0009 ADMIN PNEUMOCOCCAL VACCINE: HCPCS

## 2023-01-17 PROCEDURE — 90677 PCV20 VACCINE IM: CPT

## 2023-01-17 NOTE — PROGRESS NOTES
"  Assessment & Plan     Type 2 diabetes mellitus without complication, unspecified whether long term insulin use (H)  HbA1c is now normal at 5.4%. Diabetic foot exam was unremarkable for lesions and neuropathy. Microalbumin is still pending. Continue with current medication regimen as prescribed.   - Hemoglobin A1c  - Albumin Random Urine Quantitative with Creat Ratio    Idiopathic hypotension  Feliciano's blood pressure was low during this visit. It was initially 84/66, then 93/66 on repeat. We discontinued his lisinopril, which was already at a low dose. Denies lightheadedness or dizziness.    Smoker unmotivated to quit  Feliciano currently smokes 5-10 cigarettes daily. He has been smoking since he was 10 years old. States that he does not want to quit smoking because it is relaxing for him, and he doesn't trust nicotine substitutes like patches or lozenges. He also declined undergoing a low dose CT scan. We discussed that there are other ways to relax besides smoking, but he insisted that he does not wish to quit. and I will follow up with the discussion to see if he changes his mind at our next visit.    Need for prophylactic vaccination and inoculation against influenza  - INFLUENZA, QUAD, HIGH DOSE, PF, 65YR + (FLUZONE HD)    High priority for 2019-nCoV vaccine  - COVID-19,PF,MODERNA BIVALENT 18+Yrs    Need for vaccination  - Pneumococcal 20 Valent Conjugate (PCV20)    BMI:   Estimated body mass index is 31.48 kg/m  as calculated from the following:    Height as of this encounter: 1.702 m (5' 7\").    Weight as of this encounter: 91.2 kg (201 lb).     No follow-ups on file.    Aparna Rg DO, MPH  [facility] DEXTER Wiggins is a 72 year old, presenting for the following health issues:  Follow Up (Diabetes follow up ), Imm/Inj (Flu Shot), and Imm/Inj (COVID-19 VACCINE)      BRENNAN Wiggins is a 72 year old male with a past medical history of schizoaffective disorder and DM2 who presents to the " "clinic for follow up of his diabetes medications. He notes that he is not having any issues with taking them. He received his Invega injection last week. Smokes 5-10 cigarettes a day and does not wish to quit. Denies dizziness, lightheadedness, tremors.    Review of Systems   Constitutional, HEENT, cardiovascular, pulmonary, gi and gu systems are negative, except as otherwise noted.      Objective    BP 93/65   Pulse 92   Temp 97.6  F (36.4  C) (Oral)   Resp 16   Ht 1.702 m (5' 7\")   Wt 91.2 kg (201 lb)   SpO2 96%   BMI 31.48 kg/m    Body mass index is 31.48 kg/m .  Physical Exam  Vitals reviewed.   Constitutional:       Appearance: Normal appearance.   HENT:      Head: Normocephalic and atraumatic.   Eyes:      Extraocular Movements: Extraocular movements intact.      Conjunctiva/sclera: Conjunctivae normal.   Cardiovascular:      Rate and Rhythm: Normal rate and regular rhythm.      Heart sounds: Normal heart sounds. No murmur heard.  Pulmonary:      Effort: Pulmonary effort is normal. No respiratory distress.      Breath sounds: Normal breath sounds. No wheezing, rhonchi or rales.   Musculoskeletal:         General: Normal range of motion.   Skin:     General: Skin is warm and dry.      Comments: No lesions on the bilateral feet. Sensations intact when using my index finger.   Neurological:      Mental Status: He is alert and oriented to person, place, and time.   Psychiatric:         Mood and Affect: Mood normal.         Behavior: Behavior normal.         Thought Content: Thought content normal.        ----- Service Performed and Documented by Resident or Fellow ------            "

## 2023-01-17 NOTE — PATIENT INSTRUCTIONS
Patient Education   Here is the plan from today's visit    1. Type 2 diabetes mellitus without complication, unspecified whether long term insulin use (H)  Stop taking lisinopril! Your blood pressure is quite low. Your HbA1c is normal!  - Hemoglobin A1c; Future  - Albumin Random Urine Quantitative with Creat Ratio; Future  - Hemoglobin A1c  - Albumin Random Urine Quantitative with Creat Ratio    2. Need for prophylactic vaccination and inoculation against influenza  - INFLUENZA, QUAD, HIGH DOSE, PF, 65YR + (FLUZONE HD)    3. High priority for 2019-nCoV vaccine  - COVID-19,PF,MODERNA BIVALENT 18+Yrs    4. Need for vaccination  - Pneumococcal 20 Valent Conjugate (PCV20)    Please call or return to clinic if your symptoms don't go away.    Follow up plan  No follow-ups on file.    Thank you for coming to [facility] today.  Lab Testing:  **If you had lab testing today and your results are reassuring or normal they will be mailed to you or sent through Good[employer] within 7 days.   **If the lab tests need quick action we will call you with the results.  **If you are having labs done on a different day, please call [phone] to schedule at [facility] or [phone] for other [facility] locations. Labs do not offer walk-in appointments.  The phone number we will call with results is # [phone] (home) none (work). If this is not the best number please call our clinic and change the number.  Medication Refills:  If you need any refills please call your pharmacy and they will contact us.   If you need to  your refill at a new pharmacy, please contact the new pharmacy directly. The new pharmacy will help you get your medications transferred faster.   Scheduling:  If you have any concerns about today's visit or wish to schedule another appointment please call our office during normal business hours [phone] (8-5:00 M-F). If you can no longer make a scheduled visit, please cancel via  Kary or call us to cancel.   If a referral was made to an [facility] [place] specialty provider and you do not get a call from central scheduling, please refer to directions on your visit summary or call our office during normal business hours for assistance.   If a Mammogram was ordered for you at the [facility] Center call [phone] to schedule or change your appointment.  If you had an XRay/CT/Ultrasound/MRI ordered the number is [phone] to schedule or change your radiology appointment.   [facility] has limited ultrasound appointments available on Wednesdays, if you would like your ultrasound at [facility], please call [phone] to schedule.   Medical Concerns:  If you have urgent medical concerns please call [phone] at any time of the day.    Aparna Rg, DO

## 2023-02-14 ENCOUNTER — DOCUMENTATION ONLY (OUTPATIENT)
Dept: FAMILY MEDICINE | Facility: CLINIC | Age: 73
End: 2023-02-14
Payer: COMMERCIAL

## 2023-02-14 NOTE — PROGRESS NOTES
"When opening a documentation only encounter, be sure to enter in \"Chief Complaint\" Forms and in \" Comments\" Title of form, description if needed.    Feliciano is a 72 year old  male  Form received via: Fax  Form now resides in: Provider Ready    Zulema Galeano          "

## 2023-02-16 ENCOUNTER — DOCUMENTATION ONLY (OUTPATIENT)
Dept: FAMILY MEDICINE | Facility: CLINIC | Age: 73
End: 2023-02-16
Payer: COMMERCIAL

## 2023-02-16 ENCOUNTER — TELEPHONE (OUTPATIENT)
Dept: FAMILY MEDICINE | Facility: CLINIC | Age: 73
End: 2023-02-16
Payer: COMMERCIAL

## 2023-02-16 DIAGNOSIS — E11.9 TYPE 2 DIABETES MELLITUS WITHOUT COMPLICATION, UNSPECIFIED WHETHER LONG TERM INSULIN USE (H): Primary | ICD-10-CM

## 2023-02-16 RX ORDER — METFORMIN HCL 500 MG
1000 TABLET, EXTENDED RELEASE 24 HR ORAL 2 TIMES DAILY WITH MEALS
Qty: 360 TABLET | Refills: 3 | Status: SHIPPED | OUTPATIENT
Start: 2023-02-16 | End: 2023-06-27

## 2023-02-16 NOTE — PROGRESS NOTES
Asked by pharmacy to swap Metformin XR to ER as this is what they have been filling.    XR and ER are the same medication (extended release). Will refill XR and note to pharmacy that ER and XR mean the same thing.    Jayce Desouza, DO  PGY2 Family Medicine Resident   [facility]/ [facility]    Department of Family Medicine and [facility]

## 2023-02-16 NOTE — TELEPHONE ENCOUNTER
"Yea I will send a refill - but XR and ER are the same thing. There isn't an \"ER\" version - ER just stands for extended release, which is also what XR stands for \"xtended release\""

## 2023-02-16 NOTE — TELEPHONE ENCOUNTER
Patient was prescribed metformin  mg, 2 tabs twice daily.  Pharmacy sent fax requesting to refill Metformin  mg.  Pharmacy states they have been refilling with metformin ER for quite some time and are just looking for a refill.  Can we please update his med list to reflect Metformin ER and send a new prescription.  GLORIA JUAREZ RN

## 2023-02-17 NOTE — PROGRESS NOTES
Faxed signed [facility] - POC (2/14/23 to 4/14/23) on 2/17/2023. Will scan copy into chart.    Roberta Ramon  Care Coordinator  [facility]  Phone:[phone]

## 2023-04-14 DIAGNOSIS — E11.9 TYPE 2 DIABETES MELLITUS WITHOUT COMPLICATION, UNSPECIFIED WHETHER LONG TERM INSULIN USE (H): ICD-10-CM

## 2023-04-17 RX ORDER — INSULIN GLARGINE 100 [IU]/ML
INJECTION, SOLUTION SUBCUTANEOUS
Qty: 15 ML | Refills: 3 | Status: SHIPPED | OUTPATIENT
Start: 2023-04-17 | End: 2023-08-21

## 2023-04-17 NOTE — TELEPHONE ENCOUNTER
"Request for medication refill: insulin glargine (LANTUS PEN) 100 UNIT/ML pen    Providers if patient needs an appointment and you are willing to give a one month supply please refill for one month and  send a letter/MyChart using \".SMILLIMITEDREFILL\" .smillimited and route chart to \"P SMI \" (Giving one month refill in non controlled medications is strongly recommended before denial)    If refill has been denied, meaning absolutely no refills without visit, please complete the smart phrase \".smirxrefuse\" and route it to the \"P SMI MED REFILLS\"  pool to inform the patient and the pharmacy.    Luis Donaldson, CMA      "

## 2023-05-08 ENCOUNTER — TELEPHONE (OUTPATIENT)
Dept: FAMILY MEDICINE | Facility: CLINIC | Age: 73
End: 2023-05-08
Payer: COMMERCIAL

## 2023-05-08 DIAGNOSIS — E11.9 TYPE 2 DIABETES MELLITUS WITHOUT COMPLICATION, UNSPECIFIED WHETHER LONG TERM INSULIN USE (H): ICD-10-CM

## 2023-05-08 RX ORDER — ATORVASTATIN CALCIUM 40 MG/1
40 TABLET, FILM COATED ORAL DAILY
Qty: 30 TABLET | Refills: 11 | Status: SHIPPED | OUTPATIENT
Start: 2023-05-08 | End: 2023-06-27

## 2023-05-08 NOTE — TELEPHONE ENCOUNTER
Telephone Message     5/8/2023  6:38 PM    Call returned by Le Mijares MD    Patient: Feliciano Ross   Phone number-  [phone] (home) none (work)      return their call  Phone conversation with: Patient    Situation: Feliciano Ross  Is a 72 year old  male This call is regarding  Medication refill.  Background : Feliciano is calling for a refill of his atorvastatin. States his pharmacy tried to call the clinic for refills without success.    Recommendation/Plan: Atorvastatin refill sent to patient's preferred pharmacy.     Le Mijares MD

## 2023-06-27 ENCOUNTER — OFFICE VISIT (OUTPATIENT)
Dept: FAMILY MEDICINE | Facility: CLINIC | Age: 73
End: 2023-06-27
Payer: COMMERCIAL

## 2023-06-27 VITALS
WEIGHT: 201.8 LBS | HEART RATE: 88 BPM | RESPIRATION RATE: 18 BRPM | OXYGEN SATURATION: 95 % | BODY MASS INDEX: 31.61 KG/M2 | SYSTOLIC BLOOD PRESSURE: 109 MMHG | DIASTOLIC BLOOD PRESSURE: 78 MMHG

## 2023-06-27 DIAGNOSIS — E11.9 TYPE 2 DIABETES MELLITUS WITHOUT COMPLICATION, UNSPECIFIED WHETHER LONG TERM INSULIN USE (H): ICD-10-CM

## 2023-06-27 DIAGNOSIS — E03.9 HYPOTHYROIDISM, UNSPECIFIED TYPE: ICD-10-CM

## 2023-06-27 DIAGNOSIS — Z79.4 TYPE 2 DIABETES MELLITUS WITHOUT COMPLICATION, WITH LONG-TERM CURRENT USE OF INSULIN (H): Primary | ICD-10-CM

## 2023-06-27 DIAGNOSIS — E11.9 TYPE 2 DIABETES MELLITUS WITHOUT COMPLICATION, WITH LONG-TERM CURRENT USE OF INSULIN (H): Primary | ICD-10-CM

## 2023-06-27 LAB
CREAT UR-MCNC: 200 MG/DL
HBA1C MFR BLD: 5.6 % (ref 0–5.6)
MICROALBUMIN UR-MCNC: 41 MG/L
MICROALBUMIN/CREAT UR: 20.5 MG/G CR (ref 0–17)

## 2023-06-27 PROCEDURE — 82570 ASSAY OF URINE CREATININE: CPT | Performed by: STUDENT IN AN ORGANIZED HEALTH CARE EDUCATION/TRAINING PROGRAM

## 2023-06-27 PROCEDURE — 80053 COMPREHEN METABOLIC PANEL: CPT | Performed by: STUDENT IN AN ORGANIZED HEALTH CARE EDUCATION/TRAINING PROGRAM

## 2023-06-27 PROCEDURE — 82043 UR ALBUMIN QUANTITATIVE: CPT | Performed by: STUDENT IN AN ORGANIZED HEALTH CARE EDUCATION/TRAINING PROGRAM

## 2023-06-27 PROCEDURE — 36415 COLL VENOUS BLD VENIPUNCTURE: CPT | Performed by: STUDENT IN AN ORGANIZED HEALTH CARE EDUCATION/TRAINING PROGRAM

## 2023-06-27 PROCEDURE — 99214 OFFICE O/P EST MOD 30 MIN: CPT | Mod: GC | Performed by: STUDENT IN AN ORGANIZED HEALTH CARE EDUCATION/TRAINING PROGRAM

## 2023-06-27 PROCEDURE — 84443 ASSAY THYROID STIM HORMONE: CPT | Performed by: STUDENT IN AN ORGANIZED HEALTH CARE EDUCATION/TRAINING PROGRAM

## 2023-06-27 PROCEDURE — 83036 HEMOGLOBIN GLYCOSYLATED A1C: CPT | Performed by: STUDENT IN AN ORGANIZED HEALTH CARE EDUCATION/TRAINING PROGRAM

## 2023-06-27 RX ORDER — ASPIRIN 81 MG/1
81 TABLET, CHEWABLE ORAL DAILY
Qty: 90 TABLET | Refills: 3 | Status: SHIPPED | OUTPATIENT
Start: 2023-06-27 | End: 2023-07-07

## 2023-06-27 RX ORDER — METFORMIN HCL 500 MG
500 TABLET, EXTENDED RELEASE 24 HR ORAL 2 TIMES DAILY WITH MEALS
Qty: 360 TABLET | Refills: 3 | COMMUNITY
Start: 2023-06-27 | End: 2023-09-14

## 2023-06-27 RX ORDER — LEVOTHYROXINE SODIUM 100 UG/1
100 TABLET ORAL
Qty: 90 TABLET | Refills: 3 | Status: SHIPPED | OUTPATIENT
Start: 2023-06-27 | End: 2023-07-07

## 2023-06-27 RX ORDER — ATORVASTATIN CALCIUM 40 MG/1
40 TABLET, FILM COATED ORAL DAILY
Qty: 90 TABLET | Refills: 3 | Status: SHIPPED | OUTPATIENT
Start: 2023-06-27 | End: 2023-07-07

## 2023-06-27 RX ORDER — LANCETS 21 GAUGE
1 EACH MISCELLANEOUS DAILY
Qty: 100 EACH | Refills: 4 | Status: CANCELLED | OUTPATIENT
Start: 2023-06-27

## 2023-06-27 NOTE — PROGRESS NOTES
"  Assessment & Plan     Type 2 diabetes mellitus without complication, with long-term current use of insulin (H)  A1c 5.6 today - while he has great A1c control, his spot check today was in the 400s. He is at higher risk of hypoglycemic episodes with a tight A1c - so we need to monitor carefully.     He is using a One Touch - which might be what his insurance prefers - BUT this machine does NOT allow swapping to generic strips AND his pharmacy close to his home doesn't really have these strips / DM supplies in general per him. As well, there is the burden of poke checks. I think he would benefit from a CGM instead - has it's less of a hassle to monitor - something that would really benefit him. For now let's see if we can replace his current monitor and supplies with a generic one here at [facility].      In terms of therapies - he had stopped his metformin, but does want to see if he can tolerate a half dose, so we are going to do that - 500mg BID instead of 1000mg BID.     I got to speak with his Benny CM today over the phone - Laquita. She agrees he would benefit from a delivery pharmacy and CGM, and will look into his insurance for these. I am going to ping a message to our PharmD Dr. Kearns to see if he can also help with the CGM side of things.     Hypothyroidism, unspecified type  Need a recheck, so will grab a TSH         BMI:   Estimated body mass index is 31.61 kg/m  as calculated from the following:    Height as of 1/17/23: 1.702 m (5' 7\").    Weight as of this encounter: 91.5 kg (201 lb 12.8 oz).       No follow-ups on file.    DO KALANI Castorena [facility]    Lashonda Wiggins is a 73 year old, presenting for the following health issues:  RECHECK (Patient here for follow up)      HPI     T2DM    Current Rx = Metformin 1000mg BID + Lantus 45U at bedtime     Has not been taking his metformin for the past few weeks as it was giving his diarrhea, he as not noticed any diarrhea since " "stopping     Has not been checking for around 3 months - didn't have a battery/machine - spot check today is 431     Would benefit from a CGM       Review of Systems   Constitutional, HEENT, cardiovascular, pulmonary, gi and gu systems are negative, except as otherwise noted.      Objective    /78 (BP Location: Left arm, Patient Position: Sitting, Cuff Size: Adult Regular)   Pulse 88   Resp 18   Wt 91.5 kg (201 lb 12.8 oz)   SpO2 95%   BMI 31.61 kg/m    Body mass index is 31.61 kg/m .  Physical Exam   GENERAL: healthy, alert and no distress  PSYCH:   Appearance: Dressed appropriately   Behavior: Slow to speak but attentive, hard of hearing  Speech: Slow  Mood: \"....\"  Emotive: Flat  Psychomotor: Slow  Thought Process: A->B  Thought Content: No AVH, Not responding to internal stimuli   Insight: Fair  Judgement: Fair  Cognition: AAOx3, [place]    ----- Service Performed and Documented by Resident or Fellow ------            "

## 2023-06-27 NOTE — Clinical Note
Jeffery MITCHELL - you are seeing this pt next week for an MTM with his glucose reading machine. I honestly think he would fair so much better with a CGM, and he really likes that idea too. Do you think you could discuss that with him and see if we could get em one?  Ty!!  - Walter

## 2023-06-28 LAB
ALBUMIN SERPL BCG-MCNC: 4.1 G/DL (ref 3.5–5.2)
ALP SERPL-CCNC: 76 U/L (ref 40–129)
ALT SERPL W P-5'-P-CCNC: 35 U/L (ref 0–70)
ANION GAP SERPL CALCULATED.3IONS-SCNC: 12 MMOL/L (ref 7–15)
AST SERPL W P-5'-P-CCNC: 45 U/L (ref 0–45)
BILIRUB SERPL-MCNC: 2.1 MG/DL
BUN SERPL-MCNC: 24.1 MG/DL (ref 8–23)
CALCIUM SERPL-MCNC: 9.5 MG/DL (ref 8.8–10.2)
CHLORIDE SERPL-SCNC: 106 MMOL/L (ref 98–107)
CREAT SERPL-MCNC: 1.32 MG/DL (ref 0.67–1.17)
DEPRECATED HCO3 PLAS-SCNC: 25 MMOL/L (ref 22–29)
GFR SERPL CREATININE-BSD FRML MDRD: 58 ML/MIN/1.73M2
GLUCOSE SERPL-MCNC: 125 MG/DL (ref 70–99)
POTASSIUM SERPL-SCNC: 4.6 MMOL/L (ref 3.4–5.3)
PROT SERPL-MCNC: 6.8 G/DL (ref 6.4–8.3)
SODIUM SERPL-SCNC: 143 MMOL/L (ref 136–145)
TSH SERPL DL<=0.005 MIU/L-ACNC: 1.27 UIU/ML (ref 0.3–4.2)

## 2023-06-29 DIAGNOSIS — N17.9 AKI (ACUTE KIDNEY INJURY) (H): Primary | ICD-10-CM

## 2023-06-30 ENCOUNTER — TELEPHONE (OUTPATIENT)
Dept: FAMILY MEDICINE | Facility: CLINIC | Age: 73
End: 2023-06-30
Payer: COMMERCIAL

## 2023-06-30 NOTE — TELEPHONE ENCOUNTER
2nd attempt to reach patient unable to leave message, forwarding back to provider to send letter.    Joya Purcell RN

## 2023-06-30 NOTE — TELEPHONE ENCOUNTER
Called patient to relay provider message, no answer, unable to leave message as mailbox not set up    Joya Purcell RN

## 2023-06-30 NOTE — TELEPHONE ENCOUNTER
----- Message from Jayce Desouza, DO sent at 6/29/2023  2:24 PM CDT -----  Hey team, if we can get a chance, can we let Mr. Sher's know his kidney seems a tad dry (his creatinine is up). He is seeing Pharmacy next week, so if he can keep push his water and fluid intake up that would be good, and we can re-check when he comes in next week.    - Walter

## 2023-07-07 ENCOUNTER — OFFICE VISIT (OUTPATIENT)
Dept: PHARMACY | Facility: CLINIC | Age: 73
End: 2023-07-07
Payer: COMMERCIAL

## 2023-07-07 VITALS — DIASTOLIC BLOOD PRESSURE: 69 MMHG | SYSTOLIC BLOOD PRESSURE: 107 MMHG | HEART RATE: 64 BPM

## 2023-07-07 DIAGNOSIS — E11.9 TYPE 2 DIABETES MELLITUS WITHOUT COMPLICATION, WITH LONG-TERM CURRENT USE OF INSULIN (H): ICD-10-CM

## 2023-07-07 DIAGNOSIS — E11.9 TYPE 2 DIABETES MELLITUS WITHOUT COMPLICATION, UNSPECIFIED WHETHER LONG TERM INSULIN USE (H): ICD-10-CM

## 2023-07-07 DIAGNOSIS — Z79.4 TYPE 2 DIABETES MELLITUS WITHOUT COMPLICATION, WITH LONG-TERM CURRENT USE OF INSULIN (H): ICD-10-CM

## 2023-07-07 DIAGNOSIS — E03.9 HYPOTHYROIDISM, UNSPECIFIED TYPE: ICD-10-CM

## 2023-07-07 PROCEDURE — 99605 MTMS BY PHARM NP 15 MIN: CPT | Performed by: PHARMACIST

## 2023-07-07 RX ORDER — LEVOTHYROXINE SODIUM 100 UG/1
100 TABLET ORAL
Qty: 90 TABLET | Refills: 3 | Status: SHIPPED | OUTPATIENT
Start: 2023-07-07 | End: 2023-09-14

## 2023-07-07 RX ORDER — ATORVASTATIN CALCIUM 40 MG/1
40 TABLET, FILM COATED ORAL DAILY
Qty: 90 TABLET | Refills: 3 | Status: SHIPPED | OUTPATIENT
Start: 2023-07-07 | End: 2023-09-14

## 2023-07-07 RX ORDER — ASPIRIN 81 MG/1
81 TABLET, CHEWABLE ORAL DAILY
Qty: 90 TABLET | Refills: 3 | Status: SHIPPED | OUTPATIENT
Start: 2023-07-07 | End: 2023-09-14

## 2023-07-07 NOTE — PATIENT INSTRUCTIONS
"Recommendations from today's MTM visit:                                                         Refills for Aspirin and Atorvastatin have been sent to [facility].    A prior authorization will be sent for the Continuous Glucose Monitor  -  Freestyle [place]        Follow-up: with Dr. Desouza    It was great speaking with you today.  I value your experience and would be very thankful for your time in providing feedback in our clinic survey. In the next few days, you may receive an email or text message from [facility] [employer] with a link to a survey related to your  clinical pharmacist.\"     To schedule another MTM appointment, please call the clinic directly or you may call the MTM scheduling line at [phone] or toll-free at [phone].     My Clinical Pharmacist's contact information:                                                      Please feel free to contact me with any questions or concerns you have.      Jan Kearns, Pharm.D.  [facility]  [phone]  [address]    Monday 10-12 noon, Tues: 8-12 noon, Wed 8-5 PM, Friday 8-5 PM  Contact me via "[employer]"T      "

## 2023-07-07 NOTE — PROGRESS NOTES
Medication Therapy Management (MTM) Encounter    ASSESSMENT:                            Medication Adherence/Access: No issues identified    Diabetes:    Controlled.  The main issue discussed today is the use of a CGM device.  Currently, Feliciano's insurance requires a PA for the use of a CGM device.      Hyperlipidemia :   Refills for atorvastatin sent to pharmacy today.      Thyroid:   Controlled.  Refills requested to pharmacy.      PLAN:                            1. Needs needles refills  2. Needs refills for ASA and Lipitor  3. Needs Levothyroid        Follow-up: with Dr. Desouza     SUBJECTIVE/OBJECTIVE:                          Feliciano Ross is a 73 year old male coming in for an initial visit. He was referred to me from Jayce Desouza DO .      Reason for visit: Medication review and DM education .    Allergies/ADRs: Reviewed in chart  Past Medical History: Reviewed in chart  Tobacco: He reports that he has quit smoking. His smoking use included cigarettes. He has a 91.50 pack-year smoking history. He quit smokeless tobacco use about 2 years ago.  Alcohol: not discussed       Medication Adherence/Access: no issues reported    Patient is hard of hearing and has a problem with his hearing aid in the clinic today.  Communication is challenging at times felt incomplete due to this barrier.        Type 2 Diabetes:    Metformin 500 mg once daily   Lantus 45 units once daily   Patient is not experiencing side effects.  Aspirin: Taking 81mg daily and denies side effects   Blood sugar monitoring: rarely.     Current diabetes symptoms: none  Diet/Exercise: not discussed   Eye exam: up to date  Foot exam: up to date  Urine Albumin:   Lab Results   Component Value Date    UMALCR 20.50 (H) 06/27/2023      Lab Results   Component Value Date    A1C 5.6 06/27/2023       Hyperlipidemia :   Atorvastatin 40 mg daily    Tolerates current treatment and dose. Denies any specific related adverse effects.        Thyroid:   TSH   Date  Value Ref Range Status   06/27/2023 1.27 0.30 - 4.20 uIU/mL Final   05/31/2022 1.89 0.40 - 4.00 mU/L Final   03/01/2017 3.58 0.40 - 4.00 mU/L Final         Today's Vitals: /69   Pulse 64   ----------------      I spent 30 minutes with this patient today. All changes were made via collaborative practice agreement with Jayce Desouza DO. A copy of the visit note was provided to the patient's provider(s).    A summary of these recommendations was given to the patient.    Jan Kearns, Pharm.D.         Medication Therapy Recommendations  No medication therapy recommendations to display

## 2023-07-07 NOTE — LETTER
"Recommended To-Do List      Prepared on: 07/10/2023       You can get the best results from your medications by completing the items on this \"To-Do List.\"      Bring your To-Do List when you go to your doctor. And, share it with your family or caregivers.    My To-Do List:  What we talked about: What I should do:    What my medicines are for, how to know if my medicines are working, made sure my medicines are safe for me and reviewed how to take my medicines.      Take my medicines every day                "

## 2023-07-07 NOTE — LETTER
July 10, 2023  Feliciano Ross  [address] [facility] [address]    Dear Mr. Ross, KALANI [facility] DEXTER     Thank you for talking with me on Jul 7, 2023 about your health and medications. As a follow-up to our conversation, I have included two documents:      1. Your Recommended To-Do List has steps you should take to get the best results from your medications.  2. Your Medication List will help you keep track of your medications and how to take them.    If you want to talk about these documents, please call Jan Kearns RPH at phone: [phone], Monday-Friday 8-4:30pm.    I look forward to working with you and your doctors to make sure your medications work well for you.    Sincerely,  Jan Kearns RPH  [facility] Pharmacist, [facility]

## 2023-07-07 NOTE — LETTER
_  Medication List        Prepared on: 07/10/2023     Bring your Medication List when you go to the doctor, hospital, or   emergency room. And, share it with your family or caregivers.     Note any changes to how you take your medications.  Cross out medications when you no longer use them.    Medication How I take it Why I use it Prescriber   aspirin (ASA) 81 MG chewable tablet Take 1 tablet (81 mg) by mouth daily Type 2 diabetes mellitus without complication, with long-term current use of insulin (H) Johnathna Toledo MD   atorvastatin (LIPITOR) 40 MG tablet Take 1 tablet (40 mg) by mouth daily Type 2 diabetes mellitus without complication, with long-term current use of insulin (H); Type 2 diabetes mellitus without complication, unspecified whether long term insulin use (H) Johnathan Toledo MD   ibuprofen (ADVIL/MOTRIN) 600 MG tablet Take 1 tablet (600 mg) by mouth every 6 hours as needed for moderate pain Acute midline thoracic back pain Mason Jay MD   LANTUS SOLOSTAR 100 UNIT/ML soln Inject 45 Units Subcutaneous At Bedtime Type 2 diabetes mellitus without complication, unspecified whether long term insulin use (H) Jayce Desouza, DO   levothyroxine (SYNTHROID/LEVOTHROID) 100 MCG tablet Take 1 tablet (100 mcg) by mouth every morning (before breakfast) Hypothyroidism, unspecified type Johnathan Toledo MD   metFORMIN (GLUCOPHAGE XR) 500 MG 24 hr tablet Take 1 tablet (500 mg) by mouth 2 times daily (with meals) Type 2 diabetes mellitus without complication, unspecified whether long term insulin use (H) Jayce Desouza DO   mineral oil-hydrophilic petrolatum (AQUAPHOR) ointment Apply 1-2 x daily to right leg wound Xerosis of skin; Wound of skin Alexandro Gibson MD   traZODone (DESYREL) 50 MG tablet Take 1 tablet (50 mg) by mouth nightly as needed for sleep (May repeat x 1 if needed) Paranoid Schizophrenia (H); History of Drug Abuse Tonia Potts MD         Add new medications, over-the-counter drugs, herbals,  vitamins, or  minerals in the blank rows below.    Medication How I take it Why I use it Prescriber                                      Allergies:      penicillins        Side effects I have had:               Other Information:              My notes and questions:

## 2023-07-11 ENCOUNTER — TELEPHONE (OUTPATIENT)
Dept: FAMILY MEDICINE | Facility: CLINIC | Age: 73
End: 2023-07-11
Payer: COMMERCIAL

## 2023-07-13 ENCOUNTER — TELEPHONE (OUTPATIENT)
Dept: FAMILY MEDICINE | Facility: CLINIC | Age: 73
End: 2023-07-13
Payer: COMMERCIAL

## 2023-07-13 NOTE — TELEPHONE ENCOUNTER
Prior Authorization Not Needed per Insurance        Medication: Continuous Blood Gluc  (FREESTYLE SANA 2 READER) CINDY-PA NOT NEEDED   Insurance Company: [employer] - Phone [phone] Fax [phone]  Expected CoPay:      Pharmacy Filling the Rx: [facility] - [place], [place] - [address]  Pharmacy Notified: Yes  Patient Notified: No    Called pharmacy and pharmacy stated that PA is Not Needed and medication is covered. **Instructed pharmacy to notify patient when script is ready to /ship.** Pharmacy stated that they have a paid claim on medication and will notify the patient on medication. Insurance also stated that PA is Not Needed and medication is covered. PA Approval on file for medication until 7/12/2026.

## 2023-07-19 NOTE — TELEPHONE ENCOUNTER
Awesome!     team, do we think we can get Mr Ross in for another MTM with our PharmD team to start the CGM? Ideally on a day I am around as I would like to also learn how to start the CGM!    - Walter
Central Prior Authorization Team   Phone: [phone]    PA Initiation    Medication: Freestyle Norris 2 [place] and Sensor    Insurance Company: [employer] - Phone [phone] Fax [phone]  Pharmacy Filling the Rx: [facility] - [place], [place] - [address]  Filling Pharmacy Phone: [phone]  Filling Pharmacy Fax:    Start Date: 7/13/2023            
Open New Encounter for PA for Freestyle Norris [place]. See New Encounter for status/outcome.   
Prior Authorization Approval        Authorization Effective Date: 6/13/2023  Authorization Expiration Date: 7/12/2026  Medication: Freestyle Norris 2 [place] and Sensor-PA APPROVED     Approved Dose/Quantity:   Reference #:     Insurance Company: [employer] - Phone [phone] Fax [phone]  Expected CoPay:       CoPay Card Available:      Foundation Assistance Needed:    Which Pharmacy is filling the prescription (Not needed for infusion/clinic administered): [facility] - [place], [place] - [address]  Pharmacy Notified: Yes- **Instructed pharmacy to notify patient when script is ready to /ship.**  Patient Notified: Yes          
Prior Authorization Retail Medication Request    Medication/Dose: Freestyle Norris 2 [place] and Sensor    ICD code (if different than what is on RX):  Type 2 diabetes mellitus without complication, with long-term current use of insulin (H) [E11.9, Z79.4]     Previously Tried and Failed:  See chart  Rationale:  See chart    Insurance Name:  [facility]  Insurance ID:  [id number]      Pharmacy Information (if different than what is on RX)  Name:  [facility] - [place], [place] - [address]  Phone:  [phone]    
Yes, he has an appt with Darya on 7/25.    Darya, this is a patient with T2DM that we switching from blood poke monitoring to CGMs. I am not sure what your visit will be about, but if you can briefly bring up the CGM and that it would be nice for them to visit AGAIN with our pharmacist to learn how to use it, etc etc etc    - Walter
Attending Attestation (For Attendings USE Only)...

## 2023-07-21 ENCOUNTER — DOCUMENTATION ONLY (OUTPATIENT)
Dept: PHARMACY | Facility: CLINIC | Age: 73
End: 2023-07-21
Payer: COMMERCIAL

## 2023-07-21 NOTE — PROGRESS NOTES
Telephone:  Prior authorization for freestyle emilia has been approved.  I have called pharmacy to confirm that this has been filled.  Pharmacy states that the medication has been filled and picked up by the patient.  I have called the patient to ask if they need instruction on how to use the device -no response.  Unable to leave voicemail.  Patient has a visit scheduled at [facility] on Tuesday, July 25 at 10 AM.  Pharmacy has requested to see the patient at that time to see if education on the CGM is required.    Jan Kearns Pharm.D.

## 2023-07-25 ENCOUNTER — OFFICE VISIT (OUTPATIENT)
Dept: PHARMACY | Facility: CLINIC | Age: 73
End: 2023-07-25
Payer: COMMERCIAL

## 2023-07-25 ENCOUNTER — OFFICE VISIT (OUTPATIENT)
Dept: FAMILY MEDICINE | Facility: CLINIC | Age: 73
End: 2023-07-25
Payer: COMMERCIAL

## 2023-07-25 VITALS
SYSTOLIC BLOOD PRESSURE: 85 MMHG | BODY MASS INDEX: 31.26 KG/M2 | DIASTOLIC BLOOD PRESSURE: 60 MMHG | OXYGEN SATURATION: 97 % | TEMPERATURE: 97.1 F | RESPIRATION RATE: 17 BRPM | WEIGHT: 199.6 LBS | HEART RATE: 76 BPM

## 2023-07-25 DIAGNOSIS — Z79.4 TYPE 2 DIABETES MELLITUS WITHOUT COMPLICATION, WITH LONG-TERM CURRENT USE OF INSULIN (H): ICD-10-CM

## 2023-07-25 DIAGNOSIS — E11.9 TYPE 2 DIABETES MELLITUS WITHOUT COMPLICATION, UNSPECIFIED WHETHER LONG TERM INSULIN USE (H): ICD-10-CM

## 2023-07-25 DIAGNOSIS — N18.1 CHRONIC KIDNEY DISEASE, STAGE 1: ICD-10-CM

## 2023-07-25 DIAGNOSIS — K76.9 LIVER DISEASE, UNSPECIFIED: ICD-10-CM

## 2023-07-25 DIAGNOSIS — E11.9 TYPE 2 DIABETES MELLITUS (H): Primary | ICD-10-CM

## 2023-07-25 DIAGNOSIS — K73.0: ICD-10-CM

## 2023-07-25 DIAGNOSIS — E11.9 TYPE 2 DIABETES MELLITUS WITHOUT COMPLICATION, WITH LONG-TERM CURRENT USE OF INSULIN (H): ICD-10-CM

## 2023-07-25 DIAGNOSIS — R53.83 OTHER FATIGUE: Primary | ICD-10-CM

## 2023-07-25 DIAGNOSIS — E66.01 MORBID OBESITY (H): ICD-10-CM

## 2023-07-25 DIAGNOSIS — R63.4 WEIGHT LOSS: ICD-10-CM

## 2023-07-25 DIAGNOSIS — B19.20 HEPATITIS C VIRUS INFECTION, UNSPECIFIED CHRONICITY: ICD-10-CM

## 2023-07-25 LAB
ALBUMIN SERPL-MCNC: 3.8 G/DL (ref 3.4–5)
ALP SERPL-CCNC: 77 U/L (ref 40–150)
ALT SERPL W P-5'-P-CCNC: 26 U/L (ref 0–70)
ANION GAP SERPL CALCULATED.3IONS-SCNC: 5 MMOL/L (ref 3–14)
AST SERPL W P-5'-P-CCNC: 34 U/L (ref 0–45)
BILIRUB SERPL-MCNC: 2.3 MG/DL (ref 0.2–1.3)
BUN SERPL-MCNC: 24 MG/DL (ref 7–30)
CALCIUM SERPL-MCNC: 9.6 MG/DL (ref 8.5–10.1)
CHLORIDE BLD-SCNC: 106 MMOL/L (ref 94–109)
CHOLEST SERPL-MCNC: 128 MG/DL
CO2 SERPL-SCNC: 26 MMOL/L (ref 20–32)
CREAT SERPL-MCNC: 1.1 MG/DL (ref 0.66–1.25)
ERYTHROCYTE [DISTWIDTH] IN BLOOD BY AUTOMATED COUNT: 12.9 % (ref 10–15)
GFR SERPL CREATININE-BSD FRML MDRD: 72 ML/MIN/1.73M2
GLUCOSE BLD-MCNC: 149 MG/DL (ref 70–99)
HCT VFR BLD AUTO: 41.7 % (ref 40–53)
HDLC SERPL-MCNC: 43 MG/DL
HGB BLD-MCNC: 13.9 G/DL (ref 13.3–17.7)
HIV 1+2 AB+HIV1 P24 AG SERPL QL IA: NONREACTIVE
LDLC SERPL CALC-MCNC: 68 MG/DL
MCH RBC QN AUTO: 31.2 PG (ref 26.5–33)
MCHC RBC AUTO-ENTMCNC: 33.3 G/DL (ref 31.5–36.5)
MCV RBC AUTO: 94 FL (ref 78–100)
NONHDLC SERPL-MCNC: 85 MG/DL
PLATELET # BLD AUTO: 184 10E3/UL (ref 150–450)
POTASSIUM BLD-SCNC: 4 MMOL/L (ref 3.4–5.3)
PROT SERPL-MCNC: 7.2 G/DL (ref 6.8–8.8)
RBC # BLD AUTO: 4.45 10E6/UL (ref 4.4–5.9)
SODIUM SERPL-SCNC: 137 MMOL/L (ref 133–144)
TRIGL SERPL-MCNC: 87 MG/DL
WBC # BLD AUTO: 6.9 10E3/UL (ref 4–11)

## 2023-07-25 PROCEDURE — 99207 PR NO CHARGE LOS: CPT | Performed by: PHARMACIST

## 2023-07-25 PROCEDURE — 87389 HIV-1 AG W/HIV-1&-2 AB AG IA: CPT

## 2023-07-25 PROCEDURE — 36415 COLL VENOUS BLD VENIPUNCTURE: CPT

## 2023-07-25 PROCEDURE — 99214 OFFICE O/P EST MOD 30 MIN: CPT | Mod: GC

## 2023-07-25 PROCEDURE — 86481 TB AG RESPONSE T-CELL SUSP: CPT

## 2023-07-25 PROCEDURE — 85027 COMPLETE CBC AUTOMATED: CPT

## 2023-07-25 PROCEDURE — 80061 LIPID PANEL: CPT

## 2023-07-25 PROCEDURE — 80053 COMPREHEN METABOLIC PANEL: CPT

## 2023-07-25 RX ORDER — ACETAMINOPHEN 325 MG/1
2 TABLET ORAL EVERY 6 HOURS PRN
COMMUNITY
Start: 2023-02-17 | End: 2023-09-14

## 2023-07-25 RX ORDER — INSULIN GLARGINE 100 [IU]/ML
45 INJECTION, SOLUTION SUBCUTANEOUS AT BEDTIME
Qty: 15 ML | Refills: 3 | Status: CANCELLED | OUTPATIENT
Start: 2023-07-25

## 2023-07-25 ASSESSMENT — PATIENT HEALTH QUESTIONNAIRE - PHQ9: SUM OF ALL RESPONSES TO PHQ QUESTIONS 1-9: 22

## 2023-07-25 NOTE — Clinical Note
Ankur Jewell,   I met this patient for the first time today, he has some significant disabilities paired with poor appetite and some profound weight loss over the past couple years. He also has diabetes and has trouble checking his blood sugars on his own. He has an outpatient  who is working on getting him into an assisted living facility but in the meantime, I am worried about him and think he could benefit from getting set up with a PCA or home health nurse or whatever we can get him to help with meals as well as checking his blood sugars. Wondering if you might be able to give his  a call to help discuss? Her name is Fern Chand and phone number is [phone]. He also has once a month visit from a nurse to inject his Invega (her name is Josey, [phone]). Please let me know what questions you have. Thanks!  -Darya

## 2023-07-25 NOTE — PATIENT INSTRUCTIONS
Patient Education   Here is the plan from today's visit    1. Type 2 diabetes mellitus without complication, unspecified whether long term insulin use (H)  Stop taking your insulin. Please continue taking metformin 500mg daily. Please use your continuous glucose monitor to check your blood sugars at least two times a day at home. If your glucose readings are consistently about 300, or if you have 2-3 readings above 400 in a row, please call [facility] and let us know so we can adjust your regimen.    2. Other fatigue  3. Weight loss  Lab tests take 2-3 days to result. I will call you if there is anything abnormal that requires immediate attention.  - Comprehensive metabolic panel  - Lipid panel; Future  - HIV Antigen Antibody Combo; Future  - Quantiferon TB Gold Plus; Future  - CBC with platelets; Future  - Lipid panel  - HIV Antigen Antibody Combo  - Quantiferon TB Gold Plus  - CBC with platelets    Please call or return to clinic if your symptoms don't go away.    Follow up plan  Return in about 2 weeks (around 8/8/2023).    Thank you for coming to [facility] today.  Lab Testing:  **If you had lab testing today and your results are reassuring or normal they will be mailed to you or sent through [employer] within 7 days.   **If the lab tests need quick action we will call you with the results.  **If you are having labs done on a different day, please call [phone] to schedule at [facility] or [phone] for other [facility] locations. Labs do not offer walk-in appointments.  The phone number we will call with results is # [phone] (home) . If this is not the best number please call our clinic and change the number.  Medication Refills:  If you need any refills please call your pharmacy and they will contact us.   If you need to  your refill at a new pharmacy, please contact the new pharmacy directly. The new pharmacy will help you get your medications transferred faster.    Scheduling:  If you have any concerns about today's visit or wish to schedule another appointment please call our office during normal business hours [phone] (8-5:00 M-F). If you can no longer make a scheduled visit, please cancel via Member Savings Program or call us to cancel.   If a referral was made to an [facility] specialty provider and you do not get a call from central scheduling, please refer to directions on your visit summary or call our office during normal business hours for assistance.   If a Mammogram was ordered for you at the [facility] call [phone] to schedule or change your appointment.  If you had an XRay/CT/Ultrasound/MRI ordered the number is [phone] to schedule or change your radiology appointment.   [facility] has limited ultrasound appointments available on Wednesdays, if you would like your ultrasound at [facility], please call [phone] to schedule.   Medical Concerns:  If you have urgent medical concerns please call [phone] at any time of the day.    Darya Persaud MD

## 2023-07-25 NOTE — Clinical Note
Jeffery Watson,  Saw this patient today and had pharmacy see him as well to reemphasize how to use his CGM.  Since his A1c has been so tightly controlled for the past year, after talk with pharmacy we ended up stopping his insulin altogether and will have him do glucose checks at home and let us know if they get to be too high as well as follow-up in 2 weeks.  I also was concerned about how much weight loss he is had over the past couple years and served a little work-up for that.  We will keep you updated!  Let me know if you have any questions or thoughts.

## 2023-07-25 NOTE — PROGRESS NOTES
Clinical Pharmacy Consult:                                                    Feliciano Ross is a 73 year old male coming in for a clinical pharmacist consult.  He was referred to me from Dr. Darya Persaud.      Reason for Consult: CGM Education     Discussion: Patient presented today wanting to learn how to apply his Freestyle Norris 2. Patient was able to apply his sensor. Patient will reapply sensor every 14 days.   Patient was able to demonstrate proficiency in applying his sensor  Patient doesn't have a smart phone so will use the Freestyle reader to scan sugars  Patient reiterated that he will scan 3-4 times per day    Plan:  1. Scan sensor 3 times a day at least  2. Continue taking Atorvastatin    Elke Trejo, Pharm.D.  Jan Kearns, Pharm.D.

## 2023-07-25 NOTE — PROGRESS NOTES
Assessment & Plan     Type 2 diabetes mellitus without complication, unspecified whether long term insulin use (H)  Patient currently on 45 units of Lantus nightly however his A1c has been less than 6 for over a year.  He has had several months of fatigue and poor energy and not been able to measure his blood glucoses at home due to not knowing how his CGM works.  Looking at his A1c trend in the past I am concerned that he is too tight of control, and paired with his low energy and concerned that he may be experiencing hypoglycemia.  Discussed case with pharmacy we will discontinue his insulin given his to control and risk for hypoglycemia.  We will continue metformin 500 mg daily, patient unable to tolerate higher doses due to diarrhea).  Patient received education from pharmacy today regarding his CGM and feels confident that he can measure his blood glucoses at home.  Provided instruction to patient to call [facility] or reach out if he experiences several blood glucose readings of over 300.  I suspect once he is able to establish an assisted living facility and his food intake improves he may require escalation of his diabetes management, but until then we will do close monitoring.  -Discontinue insulin  -Check blood glucose at home at least twice a day with CGM  -Continue metformin 500 mg daily  -Follow-up in 2 weeks    Other fatigue  Weight loss  Patient presenting with fatigue, poor energy, poor food intake, and significant weight loss over the past couple years.  Has lost 20 pounds in the past year and 70 pounds over the past 2 years.  Reports he has minimal food intake as he does not prepare for himself and the family that he lives with repair food that he does not like (he reports he is a very picky eater).  Although he is generally up-to-date with his cancer screenings including a colonoscopy in 2022 (recommended repeat colonoscopy in 5 years), recent normal CBC, recent normal liver imaging.  He does have  "a history of smoking and per chart review has not done any low-dose CT screening however he has no symptoms of cough or chest pain.  Otherwise his review of symptoms is negative.  We will start with a basic work-up today and discussed low-dose CT screening at next visit.  - Comprehensive metabolic panel  - Lipid panel; Future  - HIV Antigen Antibody Combo; Future  - Quantiferon TB Gold Plus; Future  - CBC with platelets; Future  - Please discuss low-dose CT screening at next visit for his smoking history    Liver disease, unspecified  Hx of Hepatitis C  Patient has a history of hepatitis C that as far as I can tell has been untreated however last RNA load was undetectable.  Most recent abdominal ultrasound showed underlying fibrosis.  Was recommended in the past to obtain MRI elastography and follow-up with GI however does not seem that this has occurred.  -Recheck hepatitis C viral load today       BMI:   Estimated body mass index is 31.26 kg/m  as calculated from the following:    Height as of 1/17/23: 1.702 m (5' 7\").    Weight as of this encounter: 90.5 kg (199 lb 9.6 oz).   Weight management plan: Amount of weight loss is inappropriate and is being worked up    Depression Screening Follow Up        7/25/2023     9:47 AM   PHQ   PHQ-9 Total Score 22   Q9: Thoughts of better off dead/self-harm past 2 weeks Not at all         7/25/2023     9:47 AM   Last PHQ-9   1.  Little interest or pleasure in doing things 3   2.  Feeling down, depressed, or hopeless 3   3.  Trouble falling or staying asleep, or sleeping too much 3   4.  Feeling tired or having little energy 3   5.  Poor appetite or overeating 3   6.  Feeling bad about yourself 1   7.  Trouble concentrating 3   8.  Moving slowly or restless 3   Q9: Thoughts of better off dead/self-harm past 2 weeks 0   PHQ-9 Total Score 22       Follow Up Actions Taken  Referred patient back to current mental health provider.     Return in about 2 weeks (around " 8/8/2023).    Darya Persaud MD  [facility] DEXTER Wiggins is a 73 year old, presenting for the following health issues:  Fatigue (Ear wax build up)      7/25/2023     9:41 AM   Additional Questions   Roomed by aniceto   Accompanied by self/sister     HPI   Fatigue, low energy  Reports several months of low energy and fatigue.  Doing a lot of sleeping.  Attributes this to his new medication (he was started on Lipitor 40 mg recently).  Since stopping taking his Lipitor symptoms have improved.  Has had a 20 pound weight loss over the past year and 70 pound weight loss over the past 2 years.  Reports he is a picky eater and does not like the food in his home.    Diabetes  Injects 45 units of Lantus nightly.  Last A1c was 5.6.  Set up with a CGM however has not been using it as he does not know how.  He did meet with pharmacy about a month ago however still has questions.  Not sure if his blood glucoses have been high or low.  As stated above has low intake with only about 1 meal a day.    Hepatitis C  Was diagnosed with hepatitis C with a elevated viral load that has no documentation of ever being treated.  Most recent gastroenterology note is from 2016 which recommended MR elastography and follow-up to continue talking about treatment.  Does not appear that follow-up occurred.  However did have an undetectable hepatitis C RNA load in January 2022.  Abdominal ultrasound and July 2022 obtained for elevated LFTs showed underlying fibrosis.  Patient reports he has never had any treatment.    Social  Living with the family that he is known for about 25 years, they are from , and they are not caretakers.  Does have a home visiting nurse that comes once a month to injects his Invega.  Has a  that is working on getting him into assisted living, he has been in assisted living for and did not like it but now feels like he is ready and needs it.        Objective    BP (!) 85/60   Pulse  76   Temp 97.1  F (36.2  C)   Resp 17   Wt 90.5 kg (199 lb 9.6 oz)   SpO2 97%   BMI 31.26 kg/m    Body mass index is 31.26 kg/m .  Physical Exam   GENERAL: healthy, alert and no distress  NECK: no adenopathy, no asymmetry, masses, or scars and thyroid normal to palpation  RESP: lungs clear to auscultation - no rales, rhonchi or wheezes  CV: regular rate and rhythm, normal S1 S2, no S3 or S4, no murmur, click or rub, no peripheral edema and peripheral pulses strong  ABDOMEN: soft, nontender, no hepatosplenomegaly, no masses and bowel sounds normal  MS: no gross musculoskeletal defects noted, no edema      ----- Service Performed and Documented by Resident or Fellow ------

## 2023-07-26 ENCOUNTER — TELEPHONE (OUTPATIENT)
Dept: FAMILY MEDICINE | Facility: CLINIC | Age: 73
End: 2023-07-26
Payer: COMMERCIAL

## 2023-07-26 LAB
GAMMA INTERFERON BACKGROUND BLD IA-ACNC: 0.02 IU/ML
M TB IFN-G BLD-IMP: NEGATIVE
M TB IFN-G CD4+ BCKGRND COR BLD-ACNC: 9.98 IU/ML
MITOGEN IGNF BCKGRD COR BLD-ACNC: 0.01 IU/ML
MITOGEN IGNF BCKGRD COR BLD-ACNC: 0.01 IU/ML
QUANTIFERON MITOGEN: 10 IU/ML
QUANTIFERON NIL TUBE: 0.02 IU/ML
QUANTIFERON TB1 TUBE: 0.03 IU/ML
QUANTIFERON TB2 TUBE: 0.03

## 2023-07-26 NOTE — TELEPHONE ENCOUNTER
called patient's [facility] care coordinator, Laquita, per provider request to help determine what services patient has at home and if he is able to receive additional services.     SW left voicemail asking care coordinator to call back when able.    XOCHITL Mendez

## 2023-08-21 ENCOUNTER — OFFICE VISIT (OUTPATIENT)
Dept: FAMILY MEDICINE | Facility: CLINIC | Age: 73
End: 2023-08-21
Payer: COMMERCIAL

## 2023-08-21 VITALS
SYSTOLIC BLOOD PRESSURE: 119 MMHG | OXYGEN SATURATION: 96 % | BODY MASS INDEX: 30.54 KG/M2 | WEIGHT: 195 LBS | DIASTOLIC BLOOD PRESSURE: 84 MMHG | RESPIRATION RATE: 19 BRPM | HEART RATE: 70 BPM

## 2023-08-21 DIAGNOSIS — K74.00 HEPATIC FIBROSIS: ICD-10-CM

## 2023-08-21 DIAGNOSIS — Z86.19 HX OF HEPATITIS C: ICD-10-CM

## 2023-08-21 DIAGNOSIS — E66.01 MORBID OBESITY (H): ICD-10-CM

## 2023-08-21 DIAGNOSIS — E11.65 HYPERGLYCEMIA DUE TO DIABETES MELLITUS (H): ICD-10-CM

## 2023-08-21 DIAGNOSIS — E11.9 TYPE 2 DIABETES MELLITUS WITHOUT COMPLICATION, UNSPECIFIED WHETHER LONG TERM INSULIN USE (H): ICD-10-CM

## 2023-08-21 DIAGNOSIS — Z87.891 PERSONAL HISTORY OF TOBACCO USE: Primary | ICD-10-CM

## 2023-08-21 DIAGNOSIS — Z12.5 ENCOUNTER FOR SCREENING FOR MALIGNANT NEOPLASM OF PROSTATE: ICD-10-CM

## 2023-08-21 DIAGNOSIS — Z97.4 USES HEARING AID: ICD-10-CM

## 2023-08-21 DIAGNOSIS — F20.0 PARANOID SCHIZOPHRENIA (H): ICD-10-CM

## 2023-08-21 DIAGNOSIS — Z71.89 ADVANCE CARE PLANNING: ICD-10-CM

## 2023-08-21 DIAGNOSIS — R63.4 WEIGHT LOSS: ICD-10-CM

## 2023-08-21 LAB
FASTING STATUS PATIENT QL REPORTED: NO
GLUCOSE SERPL-MCNC: 150 MG/DL (ref 70–99)
INR PPP: 1.24 (ref 0.85–1.15)
PSA SERPL DL<=0.01 NG/ML-MCNC: 0.25 NG/ML (ref 0–6.5)

## 2023-08-21 PROCEDURE — G0296 VISIT TO DETERM LDCT ELIG: HCPCS

## 2023-08-21 PROCEDURE — 36415 COLL VENOUS BLD VENIPUNCTURE: CPT

## 2023-08-21 PROCEDURE — 85610 PROTHROMBIN TIME: CPT

## 2023-08-21 PROCEDURE — 84153 ASSAY OF PSA TOTAL: CPT

## 2023-08-21 PROCEDURE — 99214 OFFICE O/P EST MOD 30 MIN: CPT | Mod: GC

## 2023-08-21 PROCEDURE — 87522 HEPATITIS C REVRS TRNSCRPJ: CPT

## 2023-08-21 PROCEDURE — 82947 ASSAY GLUCOSE BLOOD QUANT: CPT

## 2023-08-21 NOTE — PROGRESS NOTES
Assessment & Plan     Type 2 diabetes mellitus without complication, unspecified whether long term insulin use (H)  RTC after discontinuation of insulin at last visit due to symptoms consistent with hypoglycemia and a1cs below goal for >7 years, last A1c 5.4. Patient has CGM, received education on how to use it at last visit however has been unable to do this at home (patient has cognitive trouble secondary to his mental health comorbidities). BG today reassuringly 150. Moving to a nursing home in the next couple days, they will help him use his CGM and he will return to clinic with his sister to review these numbers with me and adjust diabetes regimen as appropriate.    Weight loss  Obesity  Personal history of tobacco use  Encounter for screening for malignant neoplasm of prostate  Has lost 55 pounds in the past year unintentionally. No systemic illnesses. UTD on colonoscopy. Difficult to obtain history from patient due to severe mental health comorbidities. Patient does report he is a very picky eater. Accompanied by sister to his appointments however she lives [place] away from him. Initially laboratory workup incliding quant gold, CBC, HIV, CMP all normal. Recommend additional workup. Does have a significant smoking history (>20 pack years, quit 6 months ago), recommend lung cancer screening as well.  - Prostate spec antigen screen  - Prof fee: Shared Decision Making for Lung Cancer Screening  - Glucose; Future  - Glucose  - CT Chest Lung Cancer Scrn Low Dose wo; Future    Hx of hepatitis C  Hepatic fibrosis  History of untreated hepatitis C although last check of viral load was negative. Has seen GI many years ago, who wanted patient to undergo mental health assessment for capacity prior to treatment, which never occurred. Most recent abdominal ultrasound showed underlying fibrosis. Was recommended in the past to obtain MRI elastography and follow-up with GI however does not seem that this has occurred.    - INR  - Hepatitis C RNA quantitative    Advance care planning  Schizophrenia  Patient has significant schizophrenia which impairs his ability to understand risks and benefits of medical decisions. His sister Rachell is very involved in his care, drives 250 miles to come to all of his appointments with him. She is his power of  and makes his medcial decision while he does not have capacity.    Uses hearing aid  He feels like his hearing aid might not be working.  - Adult Audiology  Referral; Future    Return in about 4 weeks (around 9/18/2023).    Darya Persaud MD  [facility] DEXTER Wiggins is a 73 year old, presenting for the following health issues:  RECHECK (Diabetes)      HPI   Been feeling well since was last seen. Was unable to figure out how to use his CGM despite receiving education. Still living at home but moving into a nursing home in a couple days. Otherwise no concerns from his end.          Objective    /84 (BP Location: Left arm, Patient Position: Sitting, Cuff Size: Adult Regular)   Pulse 70   Resp 19   Wt 88.5 kg (195 lb)   SpO2 96%   BMI 30.54 kg/m    Body mass index is 30.54 kg/m .  Physical Exam   Constitutional: awake, alert, cooperative, no apparent distress, and appears stated age  Eyes: Lids and lashes normal, pupils equal, round and reactive to light, extra ocular muscles intact, sclera clear, conjunctiva normal  ENT: normocepalic, without obvious abnormality  Hematologic / Lymphatic: no cervical lymphadenopathy and no supraclavicular lymphadenopathy  Respiratory: No increased work of breathing, good air exchange, clear to auscultation bilaterally, no crackles or wheezing  Cardiovascular: Normal apical impulse, regular rate and rhythm, normal S1 and S2, no S3 or S4, and no murmur noted  GI: soft, non-distended and non-tender  Musculoskeletal: no lower extremity pitting edema present  full range of motion noted  tone is  normal  Neurologic: Awake, alert, oriented to name, place and time.  Cranial nerves II-XII are grossly intact.  Motor is 5 out of 5 bilaterally.  Sensory is intact.        ----- Service Performed and Documented by Resident or Fellow ------      Lung Cancer Screening Shared Decision Making Visit     Feliciano Ross, a 73 year old male, is eligible for lung cancer screening    History   Smoking Status    Former    Packs/day: 1.50    Years: 61.00    Types: Cigarettes   Smokeless Tobacco    Former    Quit date: 2021       I have discussed with patient the risks and benefits of screening for lung cancer with low-dose CT.     The risks include:    radiation exposure: one low dose chest CT has as much ionizing radiation as about 15 chest x-rays, or 6 months of background radiation living in [place]      false positives: most findings/nodules are NOT cancer, but some might still require additional diagnostic evaluation, including biopsy    over-diagnosis: some slow growing cancers that might never have been clinically significant will be detected and treated unnecessarily     The benefit of early detection of lung cancer is contingent upon adherence to annual screening or more frequent follow up if indicated.     Furthermore, to benefit from screening, Feliciano must be willing and able to undergo diagnostic procedures, if indicated. Although no specific guide is available for determining severity of comorbidities, it is reasonable to withhold screening in patients who have greater mortality risk from other diseases.     We did discuss that the best way to prevent lung cancer is to not smoke.    Some patients may value a numeric estimation of lung cancer risk when evaluating if lung cancer screening is right for them, here is one calculator:

## 2023-08-21 NOTE — PATIENT INSTRUCTIONS
Patient Education   Here is the plan from today's visit    1. Personal history of tobacco use  They will call to schedule this imaging at any time.  - Prof fee: Shared Decision Making for Lung Cancer Screening  - CT Chest Lung Cancer Scrn Low Dose wo; Future  - Glucose; Future    2. Hx of hepatitis C  Lab tests to check on liver status.  - INR; Future  - Hepatitis C RNA quantitative; Future      3. Hyperglycemia due to diabetes mellitus (H)  Return to clinic after obtaining 2 weeks of blood glucose checks and recording them. Bring these numbers to the appointment.  - Glucose; Future          Please call or return to clinic if your symptoms don't go away.    Follow up plan  Return in about 4 weeks (around 9/18/2023).    Thank you for coming to [facility] today.  Lab Testing:  **If you had lab testing today and your results are reassuring or normal they will be mailed to you or sent through [employer] within 7 days.   **If the lab tests need quick action we will call you with the results.  **If you are having labs done on a different day, please call [phone] to schedule at [facility] or [phone] for other [facility] locations. Labs do not offer walk-in appointments.  The phone number we will call with results is # [phone] (home) . If this is not the best number please call our clinic and change the number.  Medication Refills:  If you need any refills please call your pharmacy and they will contact us.   If you need to  your refill at a new pharmacy, please contact the new pharmacy directly. The new pharmacy will help you get your medications transferred faster.   Scheduling:  If you have any concerns about today's visit or wish to schedule another appointment please call our office during normal business hours [phone] (8-5:00 M-F). If you can no longer make a scheduled visit, please cancel via [employer] or call us to cancel.   If a referral was made to an [facility]alth  [place] specialty provider and you do not get a call from central scheduling, please refer to directions on your visit summary or call our office during normal business hours for assistance.   If a Mammogram was ordered for you at the [facility] call [phone] to schedule or change your appointment.  If you had an XRay/CT/Ultrasound/MRI ordered the number is [phone] to schedule or change your radiology appointment.   [facility] has limited ultrasound appointments available on Wednesdays, if you would like your ultrasound at [facility], please call [phone] to schedule.   Medical Concerns:  If you have urgent medical concerns please call [phone] at any time of the day.    Darya Persaud MD    Lung Cancer Screening   Frequently Asked Questions  If you are at high-risk for lung cancer, getting screened with low-dose computed tomography (LDCT) every year can help save your life. This handout offers answers to some of the most common questions about lung cancer screening. If you have other questions, please call [phone] ([phone]).     What is it?  Lung cancer screening uses special X-ray technology to create an image of your lung tissue. The exam is quick and easy and takes less than 10 seconds. We don t give you any medicine or use any needles. You can eat before and after the exam. You don t need to change your clothes as long as the clothing on your chest doesn t contain metal. But, you do need to be able to hold your breath for at least 6 seconds during the exam.    What is the goal of lung cancer screening?  The goal of lung cancer screening is to save lives. Many times, lung cancer is not found until a person starts having physical symptoms. Lung cancer screening can help detect lung cancer in the earliest stages when it may be easier to treat.    Who should be screened for lung cancer?  We suggest lung cancer screening for anyone who is at high-risk for lung cancer.  You are in the high-risk group if you:     are between the ages of 55 and 79, and   have smoked at least 1 pack of cigarettes a day for 20 or more years, and   still smoke or have quit within the past 15 years.    However, if you have a new cough or shortness of breath, you should talk to your doctor before being screened.    Why does it matter if I have symptoms?  Certain symptoms can be a sign that you have a condition in your lungs that should be checked and treated by your doctor. These symptoms include fever, chest pain, a new or changing cough, shortness of breath that you have never felt before, coughing up blood or unexplained weight loss. Having any of these symptoms can greatly affect the results of lung cancer screening.       Should all smokers get an LDCT lung cancer screening exam?  It depends. Lung cancer screening is for a very specific group of men and women who have a history of heavy smoking over a long period of time (see  Who should be screened for lung cancer  above).  I am in the high-risk group, but have been diagnosed with cancer in the past. Is LDCT lung cancer screening right for me?  In some cases, you should not have LDCT lung screening, such as when your doctor is already following your cancer with CT scan studies. Your doctor will help you decide if LDCT lung screening is right for you.  Do I need to have a screening exam every year?  Yes. If you are in the high-risk group described earlier, you should get an LDCT lung cancer screening exam every year until you are 79, or are no longer willing or able to undergo screening and possible procedures to diagnose and treat lung cancer.  How effective is LDCT at preventing death from lung cancer?  Studies have shown that LDCT lung cancer screening can lower the risk of death from lung cancer by 20 percent in people who are at high-risk.  What are the risks?  There are some risks and limitations of LDCT lung cancer screening. We want to make  sure you understand the risks and benefits, so please let us know if you have any questions. Your doctor may want to talk with you more about these risks.   Radiation exposure: As with any exam that uses radiation, there is a very small increased risk of cancer. The amount of radiation in LDCT is small--about the same amount a person would get from a mammogram. Your doctor orders the exam when he or she feels the potential benefits outweigh the risks.   False negatives: No test is perfect, including LDCT. It is possible that you may have a medical condition, including lung cancer, that is not found during your exam. This is called a false negative result.   False positives and more testing: LDCT very often finds something in the lung that could be cancer, but in fact is not. This is called a false positive result. False positive tests often cause anxiety. To make sure these findings are not cancer, you may need to have more tests. These tests will be done only if you give us permission. Sometimes patients need a treatment that can have side effects, such as a biopsy. For more information on false positives, see  What can I expect from the results?    Findings not related to lung cancer: Your LDCT exam also takes pictures of areas of your body next to your lungs. In a very small number of cases, the CT scan will show an abnormal finding in one of these areas, such as your kidneys, adrenal glands, liver or thyroid. This finding may not be serious, but you may need more tests. Your doctor can help you decide what other tests you may need, if any.  What can I expect from the results?  About 1 out of 4 LDCT exams will find something that may need more tests. Most of the time, these findings are lung nodules. Lung nodules are very small collections of tissue in the lung. These nodules are very common, and the vast majority--more than 97 percent--are not cancer (benign). Most are normal lymph nodes or small areas of  scarring from past infections.  But, if a small lung nodule is found to be cancer, the cancer can be cured more than 90 percent of the time. To know if the nodule is cancer, we may need to get more images before your next yearly screening exam. If the nodule has suspicious features (for example, it is large, has an odd shape or grows over time), we will refer you to a specialist for further testing.  Will my doctor also get the results?  Yes. Your doctor will get a copy of your results.  Is it okay to keep smoking now that there s a cancer screening exam?  No. Tobacco is one of the strongest cancer-causing agents. It causes not only lung cancer, but other cancers and cardiovascular (heart) diseases as well. The damage caused by smoking builds over time. This means that the longer you smoke, the higher your risk of disease. While it is never too late to quit, the sooner you quit, the better.  Where can I find help to quit smoking?  The best way to prevent lung cancer is to stop smoking. If you have already quit smoking, congratulations and keep it up! For help on quitting smoking, please call [employer] at 7-604-QUIT-NOW (1-965.465.1497) or the American Cancer Society at 1-160.545.9082 to find local resources near you.  One-on-one health coaching:  If you d prefer to work individually with a health care provider on tobacco cessation, we offer:     Medication Therapy Management:  Our specially trained pharmacists work closely with you and your doctor to help you quit smoking.  Call [phone] or [phone] (toll free).

## 2023-08-22 LAB — HCV RNA SERPL NAA+PROBE-ACNC: NOT DETECTED IU/ML

## 2023-08-25 ENCOUNTER — TELEPHONE (OUTPATIENT)
Dept: FAMILY MEDICINE | Facility: CLINIC | Age: 73
End: 2023-08-25
Payer: COMMERCIAL

## 2023-08-25 NOTE — TELEPHONE ENCOUNTER
----- Message from Darya Persaud MD sent at 8/24/2023  9:36 PM CDT -----  Hi,    Please call patient's sister, Rachell Perez at [phone] (his decision maker) and let her know that all his laboratory testing, including his glucose, is normal. His hepatitis C test was negative. PSA was normal meaning very low concern for prostate cancer. INR, which measures coagulation, was a little bit high, which we see in people with liver disease, which we know he has--so not an unexpected result.    Thanks!    Darya Persaud MD

## 2023-08-25 NOTE — TELEPHONE ENCOUNTER
"Called Rachell back to relay provider message below, Rachell verbalized understanding.     Rachell also reported that Feliciano receives invega and either his psychiatrist is giving him a \"lower dose or he just needs a higher dose\".  She reports that yesterday he was \"feeling really down\" and his SW asked him if he is thinking about taking his life and pt said \"yeah, I rosalba am\", and SW called an ambulance. They took pt to [facility]. Rachell is letting SW handle this as she states she is 82 years old. Last she was told was that he is in the ED and they are working on placing him. Rachell wanted me to let his provider know just as a FYI.     Fina Morley RN  "

## 2023-08-25 NOTE — TELEPHONE ENCOUNTER
[facility] phone call message- general phone call:    Reason for call: Patient's sister Rachell is looking for a call back from nurse Harden regarding results. Best number to reach Rachell is [phone]    Return call needed: Yes    OK to leave a message on voice mail? Yes    Primary language: English      needed? No    Call taken on August 25, 2023 at 1:52 PM by Katya Gibson

## 2023-08-25 NOTE — TELEPHONE ENCOUNTER
Called Rachell per provider request to relay provider message below. No answer, lmtcb.    Fina Morley RN

## 2023-09-14 ENCOUNTER — TELEPHONE (OUTPATIENT)
Dept: FAMILY MEDICINE | Facility: CLINIC | Age: 73
End: 2023-09-14
Payer: COMMERCIAL

## 2023-09-14 DIAGNOSIS — E03.9 HYPOTHYROIDISM, UNSPECIFIED TYPE: ICD-10-CM

## 2023-09-14 DIAGNOSIS — F20.0 PARANOID SCHIZOPHRENIA (H): ICD-10-CM

## 2023-09-14 DIAGNOSIS — F19.11 HISTORY OF DRUG ABUSE (H): ICD-10-CM

## 2023-09-14 DIAGNOSIS — E11.9 TYPE 2 DIABETES MELLITUS WITHOUT COMPLICATION, WITH LONG-TERM CURRENT USE OF INSULIN (H): ICD-10-CM

## 2023-09-14 DIAGNOSIS — F99 INSOMNIA DUE TO OTHER MENTAL DISORDER: Primary | ICD-10-CM

## 2023-09-14 DIAGNOSIS — E11.9 TYPE 2 DIABETES MELLITUS WITHOUT COMPLICATION, UNSPECIFIED WHETHER LONG TERM INSULIN USE (H): ICD-10-CM

## 2023-09-14 DIAGNOSIS — L85.3 XEROSIS OF SKIN: ICD-10-CM

## 2023-09-14 DIAGNOSIS — F51.05 INSOMNIA DUE TO OTHER MENTAL DISORDER: Primary | ICD-10-CM

## 2023-09-14 DIAGNOSIS — M79.10 GENERALIZED MUSCLE ACHE: ICD-10-CM

## 2023-09-14 DIAGNOSIS — T14.8XXA WOUND OF SKIN: ICD-10-CM

## 2023-09-14 DIAGNOSIS — Z79.4 TYPE 2 DIABETES MELLITUS WITHOUT COMPLICATION, WITH LONG-TERM CURRENT USE OF INSULIN (H): ICD-10-CM

## 2023-09-14 RX ORDER — ACETAMINOPHEN 500 MG
500-1000 TABLET ORAL EVERY 6 HOURS PRN
Qty: 360 TABLET | Refills: 3 | Status: SHIPPED | OUTPATIENT
Start: 2023-09-14

## 2023-09-14 RX ORDER — ATORVASTATIN CALCIUM 40 MG/1
40 TABLET, FILM COATED ORAL DAILY
Qty: 90 TABLET | Refills: 3 | Status: SHIPPED | OUTPATIENT
Start: 2023-09-14 | End: 2024-08-20

## 2023-09-14 RX ORDER — IBUPROFEN 600 MG/1
600 TABLET, FILM COATED ORAL EVERY 6 HOURS PRN
Qty: 360 TABLET | Refills: 3 | Status: SHIPPED | OUTPATIENT
Start: 2023-09-14

## 2023-09-14 RX ORDER — MINERAL OIL/HYDROPHIL PETROLAT
OINTMENT (GRAM) TOPICAL
Qty: 420 G | Refills: 3 | Status: SHIPPED | OUTPATIENT
Start: 2023-09-14

## 2023-09-14 RX ORDER — TRAZODONE HYDROCHLORIDE 50 MG/1
50 TABLET, FILM COATED ORAL
Qty: 90 TABLET | Refills: 3 | Status: SHIPPED | OUTPATIENT
Start: 2023-09-14 | End: 2023-09-14

## 2023-09-14 RX ORDER — MIRTAZAPINE 7.5 MG/1
7.5 TABLET, FILM COATED ORAL AT BEDTIME
Qty: 90 TABLET | Refills: 3 | Status: SHIPPED | OUTPATIENT
Start: 2023-09-14

## 2023-09-14 RX ORDER — ACETAMINOPHEN 325 MG/1
650 TABLET ORAL EVERY 6 HOURS PRN
Qty: 360 TABLET | Refills: 3 | Status: SHIPPED | OUTPATIENT
Start: 2023-09-14 | End: 2023-09-14

## 2023-09-14 RX ORDER — ASPIRIN 81 MG/1
81 TABLET, CHEWABLE ORAL DAILY
Qty: 90 TABLET | Refills: 3 | Status: SHIPPED | OUTPATIENT
Start: 2023-09-14 | End: 2024-08-20

## 2023-09-14 RX ORDER — METFORMIN HCL 500 MG
500 TABLET, EXTENDED RELEASE 24 HR ORAL 2 TIMES DAILY WITH MEALS
Qty: 360 TABLET | Refills: 3 | Status: SHIPPED | OUTPATIENT
Start: 2023-09-14 | End: 2023-09-15

## 2023-09-14 RX ORDER — LEVOTHYROXINE SODIUM 100 UG/1
100 TABLET ORAL
Qty: 90 TABLET | Refills: 3 | Status: SHIPPED | OUTPATIENT
Start: 2023-09-14 | End: 2024-08-20

## 2023-09-14 NOTE — TELEPHONE ENCOUNTER
Hey all  - Sent all relevant meds to the new pharmacy EXCEPT his Invega Sustenna Injection - that is handled by his Psych/ACT team (he goes to get his injection from the psych clinic).    - H/P? - WE don't really have a recent annual or anything. I think what would be most relevant is to send the psychiatric HnP and Discharge Note.     - Ms Gibson I will CC you a letter that has his med list.    - What orders?? Like what are they asking for specifically???    Walter

## 2023-09-14 NOTE — LETTER
2023      Medication list for Feliciano Ross ( 1950)    General Aches and Pains  Acetaminophen 500mg - take 1-2 tablets every 6 hours as needed for generalized aches and pains - max 3500mg per day    Ibuprofen 600mg - take 1 tablet every 6 hours as needed for generalized aches and pains - consider alternating between this and acetaminophen    Cholesterol  Atorvastatin 40mg - take 1 tablet daily    Hypothyroidism  Levothyroxine 100mcg - take 1 tablet daily (BEFORE breakfast meal)    Type 2 Diabetes  Metformin 500mg - take 1 tablet with meals two times a day   Aspirin 81mg - take 1 tablet daily   Continuous Glucose Monitor - used to help monitor glucose and lessen need for finger pokes    Dry Skin  Aquaphor - use as needed on dry skin     Insomnia  Mirtazapine 7.5mg - take 1 tablet at bedtime    Paranoid Schizophrenia  Invega Sustenna Injection - this is a monthly injection done by his Psychiatry teamkwame Desouza, DO

## 2023-09-14 NOTE — LETTER
Letter date 9/15/2023    Medication list for Feliciano Ross ( 1950)    General Aches and Pains  Acetaminophen 500mg - take 1-2 tablets every 6 hours as needed for generalized aches and pains - max 3500mg per day    Ibuprofen 600mg - take 1 tablet every 6 hours as needed for generalized aches and pains - consider alternating between this and acetaminophen    Cholesterol  Atorvastatin 40mg - take 1 tablet daily    Hypothyroidism  Levothyroxine 100mcg - take 1 tablet daily (BEFORE breakfast meal)    Type 2 Diabetes  Metformin 500mg - take 1 tablet with dinner daily   Aspirin 81mg - take 1 tablet daily   Continuous Glucose Monitor - used to help monitor glucose and lessen need for finger pokes    Dry Skin  Aquaphor - use as needed on dry skin     Insomnia  Mirtazapine 7.5mg - take 1 tablet at bedtime    Paranoid Schizophrenia  Invega Sustenna Injection - this is a monthly injection done by his Psychiatry teamDO Jayce Altamirano DO

## 2023-09-14 NOTE — PROGRESS NOTES
Orders for medications fills to new pharmacy, and letter of current medications to his [facility] at [address], [place], [place].      Jayce Desouza DO  PGY3 Family Medicine Resident   [facility]    Department of Family Medicine and [facility]

## 2023-09-15 RX ORDER — METFORMIN HCL 500 MG
500 TABLET, EXTENDED RELEASE 24 HR ORAL
Qty: 90 TABLET | Refills: 3 | Status: SHIPPED | OUTPATIENT
Start: 2023-09-15 | End: 2024-08-20

## 2023-10-03 ENCOUNTER — TELEPHONE (OUTPATIENT)
Dept: FAMILY MEDICINE | Facility: CLINIC | Age: 73
End: 2023-10-03
Payer: COMMERCIAL

## 2023-10-03 DIAGNOSIS — E11.9 TYPE 2 DIABETES MELLITUS WITHOUT COMPLICATION, WITH LONG-TERM CURRENT USE OF INSULIN (H): ICD-10-CM

## 2023-10-03 DIAGNOSIS — Z79.4 TYPE 2 DIABETES MELLITUS WITHOUT COMPLICATION, WITH LONG-TERM CURRENT USE OF INSULIN (H): ICD-10-CM

## 2023-10-03 NOTE — TELEPHONE ENCOUNTER
Warm transfer from . [place] pharmacy was on the line and stated that they received a script from PCP for the sensor but pt needs the reader. Pt spoke with PCP and gave VO for the reader since this I the 3rd call we have gotten today regarding this.     Fina Morley RN

## 2023-10-03 NOTE — TELEPHONE ENCOUNTER
[facility] phone call message- general phone call:    Reason for call: Fern from [facility] called to request the freestyle emilia 2 reader     PHARMACY CONFIRMED:  [facility] - [place], [place] - [address]  [address] [facility] [address]  [facility] [address]  Phone: [phone] Fax: [phone]    Primary language: English      needed? No    Call taken on October 3, 2023 at 12:53 PM by Dannie Merino

## 2023-10-03 NOTE — TELEPHONE ENCOUNTER
[facility] phone call message- patient requesting a refill:    Full Medication Name: FreeStyle [place] 2 [place]        Pharmacy confirmed as       [facility] - [place], [place] - [address]  [address]  [facility] [address]  Phone: [phone] Fax: [phone]    : Yes    Additional Comments: The patient have just moved to a nursing facility and he does not have a reader to check his levels.     OK to leave a message on voice mail? Yes      Primary language: English      needed? No    Call taken on October 3, 2023 at 11:17 AM by Delmi Banuelos

## 2024-01-05 ENCOUNTER — TELEPHONE (OUTPATIENT)
Dept: FAMILY MEDICINE | Facility: CLINIC | Age: 74
End: 2024-01-05
Payer: COMMERCIAL

## 2024-01-05 NOTE — TELEPHONE ENCOUNTER
Prior Authorization Retail Medication Request    Medication/Dose:   Diagnosis and ICD code (if different than what is on RX):  New/renewal/insurance change PA/secondary ins. PA:  Previously Tried and Failed:    Rationale:      Insurance   Primary: [facility] DUAL   Insurance ID:  [id number]     Secondary (if applicable):  Insurance ID:      Pharmacy Information (if different than what is on RX)  Name:    Phone:    Fax:

## 2024-01-24 ENCOUNTER — DOCUMENTATION ONLY (OUTPATIENT)
Dept: FAMILY MEDICINE | Facility: CLINIC | Age: 74
End: 2024-01-24
Payer: COMMERCIAL

## 2024-01-24 NOTE — PROGRESS NOTES
"When opening a documentation only encounter, be sure to enter in \"Chief Complaint\" Forms and in \" Comments\" Title of form, description if needed.    Feliciano is a 73 year old  male  Form received via: Fax  Form now resides in: Provider Ready    KAMILLE MCCOY    Form has been completed by provider.     Form sent out via: Fax to [facility] at Fax Number: [phone]  Patient informed: N/A  Output date: January 26, 2024    KAMILLE MCCOY      **Please close the encounter**              "

## 2024-02-03 NOTE — CONFIDENTIAL NOTE
[facility] phone call message- general phone call:    Reason for call: [facility] patient is moving to [facility] at [address], [place], [place]. States they need a H&P, current medication list, and physician orders faxed to them at [phone]. Does a nurse need to do this? Otherwise, I'm happy to.   They also want all current medications sent to a new pharmacy- [facility] [place] [address]   Phone: [phone]  Fax: [phone]    Return call needed: Yes    OK to leave a message on voice mail? Yes    Primary language: English      needed? No    Call taken on September 14, 2023 at 10:12 AM by Katya Gibson          [Follow-Up: _____] : a [unfilled] follow-up visit

## 2024-04-12 DIAGNOSIS — Z79.4 TYPE 2 DIABETES MELLITUS WITHOUT COMPLICATION, WITH LONG-TERM CURRENT USE OF INSULIN (H): ICD-10-CM

## 2024-04-12 DIAGNOSIS — E11.9 TYPE 2 DIABETES MELLITUS WITHOUT COMPLICATION, WITH LONG-TERM CURRENT USE OF INSULIN (H): ICD-10-CM

## 2024-04-12 NOTE — TELEPHONE ENCOUNTER
"Request for medication refill:  Continuous Blood Gluc Sensor (FREESTYLE SANA 2 SENSOR) [facility]     Providers if patient needs an appointment and you are willing to give a one month supply please refill for one month and  send a letter/MyChart using \".SMILLIMITEDREFILL\" .smillimited and route chart to \"P SMI \" (Giving one month refill in non controlled medications is strongly recommended before denial)    If refill has been denied, meaning absolutely no refills without visit, please complete the smart phrase \".smirxrefuse\" and route it to the \"P SMI MED REFILLS\"  pool to inform the patient and the pharmacy.    Russ Jain, CMA      "

## 2024-06-11 DIAGNOSIS — E11.9 TYPE 2 DIABETES MELLITUS WITHOUT COMPLICATION, WITH LONG-TERM CURRENT USE OF INSULIN (H): Primary | ICD-10-CM

## 2024-06-11 DIAGNOSIS — Z79.4 TYPE 2 DIABETES MELLITUS WITHOUT COMPLICATION, WITH LONG-TERM CURRENT USE OF INSULIN (H): Primary | ICD-10-CM

## 2024-06-12 ENCOUNTER — TELEPHONE (OUTPATIENT)
Dept: FAMILY MEDICINE | Facility: CLINIC | Age: 74
End: 2024-06-12
Payer: COMMERCIAL

## 2024-06-12 NOTE — TELEPHONE ENCOUNTER
Prior Authorization Specialty Medication Request    Medication/Dose: Continuous Glucose  (FREESTYLE SANA 2 READER) CINDY   Diagnosis and ICD code (if different than what is on RX):    New/renewal/insurance change PA/secondary ins. PA:  Previously Tried and Failed:      Important Lab Values:   Rationale:     Insurance   Primary: ARE   Insurance ID:  [id number]     Secondary (if applicable):  Insurance ID:      Pharmacy Information (if different than what is on RX)  Name:    Phone:    Fax:

## 2024-06-20 NOTE — TELEPHONE ENCOUNTER
Retail Pharmacy Prior Authorization Team   Phone: [phone]    Prior Authorization Not Needed per Insurance    Medication: FREESTYLE SANA 2 READER CINDY  Insurance Company: [employer] - Phone [phone] Fax [phone]  Expected CoPay: $    Pharmacy Filling the Rx: [place], [place] - [facility] [address]  Pharmacy Notified: YES  Patient Notified: YES **Instructed pharmacy to notify patient when script is ready to /ship.**

## 2024-06-24 DIAGNOSIS — E11.9 TYPE 2 DIABETES MELLITUS WITHOUT COMPLICATION, WITH LONG-TERM CURRENT USE OF INSULIN (H): ICD-10-CM

## 2024-06-24 DIAGNOSIS — Z79.4 TYPE 2 DIABETES MELLITUS WITHOUT COMPLICATION, WITH LONG-TERM CURRENT USE OF INSULIN (H): ICD-10-CM

## 2024-06-24 NOTE — TELEPHONE ENCOUNTER
"Request for medication refill:    Continuous Blood Gluc Sensor (FREESTYLE SANA 2 SENSOR) [facility]    Providers if patient needs an appointment and you are willing to give a one month supply please refill for one month and  send a letter/MyChart using \".SMILLIMITEDREFILL\" .smillimited and route chart to \"P SMI \" (Giving one month refill in non controlled medications is strongly recommended before denial)    If refill has been denied, meaning absolutely no refills without visit, please complete the smart phrase \".smirxrefuse\" and route it to the \"P SMI MED REFILLS\"  pool to inform the patient and the pharmacy.    Merissa Deng MA      "

## 2024-08-19 DIAGNOSIS — E11.9 TYPE 2 DIABETES MELLITUS WITHOUT COMPLICATION, UNSPECIFIED WHETHER LONG TERM INSULIN USE (H): ICD-10-CM

## 2024-08-19 DIAGNOSIS — E03.9 HYPOTHYROIDISM, UNSPECIFIED TYPE: ICD-10-CM

## 2024-08-19 DIAGNOSIS — Z79.4 TYPE 2 DIABETES MELLITUS WITHOUT COMPLICATION, WITH LONG-TERM CURRENT USE OF INSULIN (H): ICD-10-CM

## 2024-08-19 DIAGNOSIS — E11.9 TYPE 2 DIABETES MELLITUS WITHOUT COMPLICATION, WITH LONG-TERM CURRENT USE OF INSULIN (H): ICD-10-CM

## 2024-08-20 RX ORDER — METFORMIN HCL 500 MG
TABLET, EXTENDED RELEASE 24 HR ORAL
Qty: 90 TABLET | Refills: 3 | Status: SHIPPED | OUTPATIENT
Start: 2024-08-20

## 2024-08-20 RX ORDER — ASPIRIN 81 MG
81 TABLET,CHEWABLE ORAL DAILY
Qty: 90 TABLET | Refills: 3 | Status: SHIPPED | OUTPATIENT
Start: 2024-08-20

## 2024-08-20 RX ORDER — LEVOTHYROXINE SODIUM 100 UG/1
TABLET ORAL
Qty: 90 TABLET | Refills: 3 | Status: SHIPPED | OUTPATIENT
Start: 2024-08-20

## 2024-08-20 RX ORDER — ATORVASTATIN CALCIUM 40 MG/1
40 TABLET, FILM COATED ORAL DAILY
Qty: 90 TABLET | Refills: 3 | Status: SHIPPED | OUTPATIENT
Start: 2024-08-20

## 2024-08-20 NOTE — TELEPHONE ENCOUNTER
"Request for medication refill:  aspirin (ASA) 81 MG chewable tablet     atorvastatin (LIPITOR) 40 MG tablet     levothyroxine (SYNTHROID/LEVOTHROID) 100 MCG tablet     metFORMIN (GLUCOPHAGE XR) 500 MG 24 hr tablet     Providers if patient needs an appointment and you are willing to give a one month supply please refill for one month and  send a letter/MyChart using \".SMILLIMITEDREFILL\" .smillimited and route chart to \"P SMI \" (Giving one month refill in non controlled medications is strongly recommended before denial)    If refill has been denied, meaning absolutely no refills without visit, please complete the smart phrase \".smirxrefuse\" and route it to the \"P SMI MED REFILLS\"  pool to inform the patient and the pharmacy.    Russ Jain, CMA      "

## 2024-08-26 DIAGNOSIS — E11.9 TYPE 2 DIABETES MELLITUS WITHOUT COMPLICATION, WITH LONG-TERM CURRENT USE OF INSULIN (H): ICD-10-CM

## 2024-08-26 DIAGNOSIS — Z79.4 TYPE 2 DIABETES MELLITUS WITHOUT COMPLICATION, WITH LONG-TERM CURRENT USE OF INSULIN (H): ICD-10-CM

## 2024-08-26 NOTE — TELEPHONE ENCOUNTER
"Request for medication refill:    Continuous Glucose Sensor (FREESTYLE SANA 2 SENSOR) [facility]     Providers if patient needs an appointment and you are willing to give a one month supply please refill for one month and  send a letter/MyChart using \".SMILLIMITEDREFILL\" .smillimited and route chart to \"P SMI \" (Giving one month refill in non controlled medications is strongly recommended before denial)    If refill has been denied, meaning absolutely no refills without visit, please complete the smart phrase \".smirxrefuse\" and route it to the \"P SMI MED REFILLS\"  pool to inform the patient and the pharmacy.    Mazin Alexis MA     "

## (undated) RX ORDER — FENTANYL CITRATE 50 UG/ML
INJECTION, SOLUTION INTRAMUSCULAR; INTRAVENOUS
Status: DISPENSED
Start: 2022-06-15